# Patient Record
Sex: MALE | Race: WHITE | NOT HISPANIC OR LATINO | Employment: OTHER | URBAN - METROPOLITAN AREA
[De-identification: names, ages, dates, MRNs, and addresses within clinical notes are randomized per-mention and may not be internally consistent; named-entity substitution may affect disease eponyms.]

---

## 2022-10-07 ENCOUNTER — HOSPITAL ENCOUNTER (INPATIENT)
Facility: HOSPITAL | Age: 62
LOS: 1 days | DRG: 871 | End: 2022-10-07
Attending: EMERGENCY MEDICINE | Admitting: INTERNAL MEDICINE
Payer: COMMERCIAL

## 2022-10-07 ENCOUNTER — HOSPITAL ENCOUNTER (INPATIENT)
Facility: HOSPITAL | Age: 62
LOS: 4 days | Discharge: HOME HEALTH | DRG: 216 | End: 2022-10-11
Attending: INTERNAL MEDICINE | Admitting: THORACIC SURGERY (CARDIOTHORACIC VASCULAR SURGERY)
Payer: COMMERCIAL

## 2022-10-07 ENCOUNTER — ANESTHESIA EVENT (INPATIENT)
Dept: PERIOP | Facility: HOSPITAL | Age: 62
DRG: 871 | End: 2022-10-07
Payer: COMMERCIAL

## 2022-10-07 ENCOUNTER — ANESTHESIA (INPATIENT)
Dept: PERIOP | Facility: HOSPITAL | Age: 62
DRG: 871 | End: 2022-10-07
Payer: COMMERCIAL

## 2022-10-07 ENCOUNTER — APPOINTMENT (INPATIENT)
Dept: CT IMAGING | Facility: HOSPITAL | Age: 62
DRG: 871 | End: 2022-10-07
Payer: COMMERCIAL

## 2022-10-07 ENCOUNTER — APPOINTMENT (OUTPATIENT)
Dept: RADIOLOGY | Facility: HOSPITAL | Age: 62
DRG: 871 | End: 2022-10-07
Payer: COMMERCIAL

## 2022-10-07 ENCOUNTER — APPOINTMENT (INPATIENT)
Dept: NON INVASIVE DIAGNOSTICS | Facility: HOSPITAL | Age: 62
DRG: 871 | End: 2022-10-07
Payer: COMMERCIAL

## 2022-10-07 ENCOUNTER — APPOINTMENT (OUTPATIENT)
Dept: RADIOLOGY | Facility: HOSPITAL | Age: 62
DRG: 216 | End: 2022-10-07
Payer: COMMERCIAL

## 2022-10-07 VITALS
BODY MASS INDEX: 25.18 KG/M2 | SYSTOLIC BLOOD PRESSURE: 103 MMHG | OXYGEN SATURATION: 98 % | HEART RATE: 120 BPM | DIASTOLIC BLOOD PRESSURE: 72 MMHG | RESPIRATION RATE: 42 BRPM | TEMPERATURE: 101.48 F | WEIGHT: 190 LBS | HEIGHT: 73 IN

## 2022-10-07 DIAGNOSIS — A41.9 SEPSIS (HCC): ICD-10-CM

## 2022-10-07 DIAGNOSIS — R06.89 ACUTE RESPIRATORY INSUFFICIENCY: ICD-10-CM

## 2022-10-07 DIAGNOSIS — I51.1 ACUTE MITRAL REGURGITATION FROM CHORDAL RUPTURE (HCC): ICD-10-CM

## 2022-10-07 DIAGNOSIS — I34.0 ACUTE MITRAL REGURGITATION FROM CHORDAL RUPTURE (HCC): ICD-10-CM

## 2022-10-07 DIAGNOSIS — I34.0 SEVERE MITRAL REGURGITATION: ICD-10-CM

## 2022-10-07 DIAGNOSIS — Z87.74 HISTORY OF REPAIR OF CONGENITAL ATRIAL SEPTAL DEFECT (ASD): Primary | ICD-10-CM

## 2022-10-07 DIAGNOSIS — J96.01 ACUTE RESPIRATORY FAILURE WITH HYPOXIA (HCC): ICD-10-CM

## 2022-10-07 DIAGNOSIS — Z98.890 S/P MVR (MITRAL VALVE REPAIR): ICD-10-CM

## 2022-10-07 DIAGNOSIS — R77.8 ELEVATED TROPONIN: ICD-10-CM

## 2022-10-07 DIAGNOSIS — J18.9 MULTIFOCAL PNEUMONIA: Primary | ICD-10-CM

## 2022-10-07 PROBLEM — N17.9 AKI (ACUTE KIDNEY INJURY) (HCC): Status: ACTIVE | Noted: 2022-10-07

## 2022-10-07 PROBLEM — R57.0 CARDIOGENIC SHOCK (HCC): Status: ACTIVE | Noted: 2022-10-07

## 2022-10-07 PROBLEM — R79.89 ELEVATED D-DIMER: Status: ACTIVE | Noted: 2022-10-07

## 2022-10-07 PROBLEM — R65.20 SEVERE SEPSIS (HCC): Status: ACTIVE | Noted: 2022-10-07

## 2022-10-07 PROBLEM — E87.29 HIGH ANION GAP METABOLIC ACIDOSIS: Status: ACTIVE | Noted: 2022-10-07

## 2022-10-07 PROBLEM — R79.89 ELEVATED TROPONIN: Status: ACTIVE | Noted: 2022-10-07

## 2022-10-07 PROBLEM — R74.01 TRANSAMINITIS: Status: ACTIVE | Noted: 2022-10-07

## 2022-10-07 LAB
2HR DELTA HS TROPONIN: -13 NG/L
4HR DELTA HS TROPONIN: -23 NG/L
ABO GROUP BLD: NORMAL
ABO GROUP BLD: NORMAL
ALBUMIN SERPL BCP-MCNC: 3.7 G/DL (ref 3.5–5)
ALP SERPL-CCNC: 94 U/L (ref 46–116)
ALT SERPL W P-5'-P-CCNC: 21 U/L (ref 12–78)
ANION GAP SERPL CALCULATED.3IONS-SCNC: 12 MMOL/L (ref 4–13)
ANION GAP SERPL CALCULATED.3IONS-SCNC: 14 MMOL/L (ref 4–13)
AORTIC ROOT: 3.8 CM
APICAL FOUR CHAMBER EJECTION FRACTION: 59 %
APTT PPP: 26 SECONDS (ref 23–37)
APTT PPP: 48 SECONDS (ref 23–37)
AST SERPL W P-5'-P-CCNC: 20 U/L (ref 5–45)
ATRIAL RATE: 100 BPM
BACTERIA UR QL AUTO: ABNORMAL /HPF
BASE EX.OXY STD BLDV CALC-SCNC: 77.6 % (ref 60–80)
BASE EXCESS BLDA CALC-SCNC: -1 MMOL/L (ref -2–3)
BASE EXCESS BLDA CALC-SCNC: -2 MMOL/L (ref -2–3)
BASE EXCESS BLDA CALC-SCNC: -3 MMOL/L (ref -2–3)
BASE EXCESS BLDA CALC-SCNC: -5 MMOL/L (ref -2–3)
BASE EXCESS BLDA CALC-SCNC: -6 MMOL/L (ref -2–3)
BASE EXCESS BLDA CALC-SCNC: -7 MMOL/L (ref -2–3)
BASE EXCESS BLDA CALC-SCNC: -8 MMOL/L (ref -2–3)
BASE EXCESS BLDA CALC-SCNC: -8 MMOL/L (ref -2–3)
BASE EXCESS BLDA CALC-SCNC: -9 MMOL/L (ref -2–3)
BASE EXCESS BLDA CALC-SCNC: 1 MMOL/L (ref -2–3)
BASE EXCESS BLDV CALC-SCNC: -7 MMOL/L
BASOPHILS # BLD AUTO: 0.03 THOUSANDS/ΜL (ref 0–0.1)
BASOPHILS NFR BLD AUTO: 0 % (ref 0–1)
BILIRUB SERPL-MCNC: 1.8 MG/DL (ref 0.2–1)
BILIRUB UR QL STRIP: NEGATIVE
BLD GP AB SCN SERPL QL: NEGATIVE
BUN SERPL-MCNC: 25 MG/DL (ref 5–25)
BUN SERPL-MCNC: 26 MG/DL (ref 5–25)
CA-I BLD-SCNC: 1 MMOL/L (ref 1.12–1.32)
CA-I BLD-SCNC: 1 MMOL/L (ref 1.12–1.32)
CA-I BLD-SCNC: 1.03 MMOL/L (ref 1.12–1.32)
CA-I BLD-SCNC: 1.08 MMOL/L (ref 1.12–1.32)
CA-I BLD-SCNC: 1.12 MMOL/L (ref 1.12–1.32)
CA-I BLD-SCNC: 1.18 MMOL/L (ref 1.12–1.32)
CA-I BLD-SCNC: 1.2 MMOL/L (ref 1.12–1.32)
CA-I BLD-SCNC: 1.21 MMOL/L (ref 1.12–1.32)
CALCIUM SERPL-MCNC: 8.4 MG/DL (ref 8.3–10.1)
CALCIUM SERPL-MCNC: 9.4 MG/DL (ref 8.3–10.1)
CARDIAC TROPONIN I PNL SERPL HS: 114 NG/L
CARDIAC TROPONIN I PNL SERPL HS: 124 NG/L
CARDIAC TROPONIN I PNL SERPL HS: 137 NG/L
CHLORIDE SERPL-SCNC: 100 MMOL/L (ref 96–108)
CHLORIDE SERPL-SCNC: 111 MMOL/L (ref 96–108)
CHOLEST SERPL-MCNC: 245 MG/DL
CLARITY UR: CLEAR
CO2 SERPL-SCNC: 20 MMOL/L (ref 21–32)
CO2 SERPL-SCNC: 21 MMOL/L (ref 21–32)
COLOR UR: YELLOW
CREAT SERPL-MCNC: 1.34 MG/DL (ref 0.6–1.3)
CREAT SERPL-MCNC: 1.64 MG/DL (ref 0.6–1.3)
D DIMER PPP FEU-MCNC: 0.85 UG/ML FEU
E WAVE DECELERATION TIME: 123 MS
EOSINOPHIL # BLD AUTO: 0 THOUSAND/ΜL (ref 0–0.61)
EOSINOPHIL NFR BLD AUTO: 0 % (ref 0–6)
ERYTHROCYTE [DISTWIDTH] IN BLOOD BY AUTOMATED COUNT: 12.5 % (ref 11.6–15.1)
EST. AVERAGE GLUCOSE BLD GHB EST-MCNC: 103 MG/DL
FIBRINOGEN PPP-MCNC: 335 MG/DL (ref 227–495)
FIO2 GAS DIL.REBREATH: 100 L
FIO2 GAS DIL.REBREATH: 80 L
FLUAV RNA RESP QL NAA+PROBE: NEGATIVE
FLUAV RNA RESP QL NAA+PROBE: NEGATIVE
FLUBV RNA RESP QL NAA+PROBE: NEGATIVE
FLUBV RNA RESP QL NAA+PROBE: NEGATIVE
FRACTIONAL SHORTENING: 38 (ref 28–44)
GFR SERPL CREATININE-BSD FRML MDRD: 43 ML/MIN/1.73SQ M
GFR SERPL CREATININE-BSD FRML MDRD: 55 ML/MIN/1.73SQ M
GLUCOSE SERPL-MCNC: 155 MG/DL (ref 65–140)
GLUCOSE SERPL-MCNC: 162 MG/DL (ref 65–140)
GLUCOSE SERPL-MCNC: 163 MG/DL (ref 65–140)
GLUCOSE SERPL-MCNC: 164 MG/DL (ref 65–140)
GLUCOSE SERPL-MCNC: 164 MG/DL (ref 65–140)
GLUCOSE SERPL-MCNC: 166 MG/DL (ref 65–140)
GLUCOSE SERPL-MCNC: 168 MG/DL (ref 65–140)
GLUCOSE SERPL-MCNC: 174 MG/DL (ref 65–140)
GLUCOSE SERPL-MCNC: 180 MG/DL (ref 65–140)
GLUCOSE SERPL-MCNC: 183 MG/DL (ref 65–140)
GLUCOSE SERPL-MCNC: 185 MG/DL (ref 65–140)
GLUCOSE SERPL-MCNC: 203 MG/DL (ref 65–140)
GLUCOSE UR STRIP-MCNC: NEGATIVE MG/DL
HBA1C MFR BLD: 5.2 %
HCO3 BLDA-SCNC: 15.3 MMOL/L (ref 22–28)
HCO3 BLDA-SCNC: 20.4 MMOL/L (ref 22–28)
HCO3 BLDA-SCNC: 20.9 MMOL/L (ref 22–28)
HCO3 BLDA-SCNC: 21.9 MMOL/L (ref 22–28)
HCO3 BLDA-SCNC: 22.4 MMOL/L (ref 24–30)
HCO3 BLDA-SCNC: 22.8 MMOL/L (ref 22–28)
HCO3 BLDA-SCNC: 23 MMOL/L (ref 24–30)
HCO3 BLDA-SCNC: 24.3 MMOL/L (ref 22–28)
HCO3 BLDA-SCNC: 28.7 MMOL/L (ref 24–30)
HCO3 BLDA-SCNC: 28.8 MMOL/L (ref 24–30)
HCO3 BLDV-SCNC: 20.8 MMOL/L (ref 24–30)
HCT VFR BLD AUTO: 44.6 % (ref 36.5–49.3)
HCT VFR BLD AUTO: 49.7 % (ref 36.5–49.3)
HCT VFR BLD CALC: 31 % (ref 36.5–49.3)
HCT VFR BLD CALC: 33 % (ref 36.5–49.3)
HCT VFR BLD CALC: 33 % (ref 36.5–49.3)
HCT VFR BLD CALC: 35 % (ref 36.5–49.3)
HCT VFR BLD CALC: 35 % (ref 36.5–49.3)
HCT VFR BLD CALC: 41 % (ref 36.5–49.3)
HCT VFR BLD CALC: 42 % (ref 36.5–49.3)
HCT VFR BLD CALC: 44 % (ref 36.5–49.3)
HCT VFR BLD CALC: 45 % (ref 36.5–49.3)
HCT VFR BLD CALC: 49 % (ref 36.5–49.3)
HDLC SERPL-MCNC: 110 MG/DL
HGB BLD-MCNC: 15.2 G/DL (ref 12–17)
HGB BLD-MCNC: 17.5 G/DL (ref 12–17)
HGB BLDA-MCNC: 10.5 G/DL (ref 12–17)
HGB BLDA-MCNC: 11.2 G/DL (ref 12–17)
HGB BLDA-MCNC: 11.2 G/DL (ref 12–17)
HGB BLDA-MCNC: 11.9 G/DL (ref 12–17)
HGB BLDA-MCNC: 11.9 G/DL (ref 12–17)
HGB BLDA-MCNC: 13.9 G/DL (ref 12–17)
HGB BLDA-MCNC: 14.3 G/DL (ref 12–17)
HGB BLDA-MCNC: 15 G/DL (ref 12–17)
HGB BLDA-MCNC: 15.3 G/DL (ref 12–17)
HGB BLDA-MCNC: 16.7 G/DL (ref 12–17)
HGB UR QL STRIP.AUTO: NEGATIVE
IMM GRANULOCYTES # BLD AUTO: 0.13 THOUSAND/UL (ref 0–0.2)
IMM GRANULOCYTES NFR BLD AUTO: 1 % (ref 0–2)
INR PPP: 0.9 (ref 0.84–1.19)
INR PPP: 1.01 (ref 0.84–1.19)
INR PPP: 1.36 (ref 0.84–1.19)
INTERVENTRICULAR SEPTUM IN DIASTOLE (PARASTERNAL SHORT AXIS VIEW): 1.2 CM
INTERVENTRICULAR SEPTUM: 1.2 CM (ref 0.6–1.1)
KCT BLD-ACNC: 116 SEC (ref 89–137)
KCT BLD-ACNC: 128 SEC (ref 89–137)
KCT BLD-ACNC: 427 SEC (ref 89–137)
KCT BLD-ACNC: 453 SEC (ref 89–137)
KCT BLD-ACNC: 460 SEC (ref 89–137)
KCT BLD-ACNC: 480 SEC (ref 89–137)
KCT BLD-ACNC: 486 SEC (ref 89–137)
KCT BLD-ACNC: 537 SEC (ref 89–137)
KCT BLD-ACNC: 550 SEC (ref 89–137)
KETONES UR STRIP-MCNC: NEGATIVE MG/DL
LAAS-AP2: 26.9 CM2
LAAS-AP4: 26.6 CM2
LACTATE SERPL-SCNC: 3.7 MMOL/L (ref 0.5–2)
LACTATE SERPL-SCNC: 4.1 MMOL/L (ref 0.5–2)
LDLC SERPL CALC-MCNC: 120 MG/DL (ref 0–100)
LEFT ATRIUM AREA SYSTOLE SINGLE PLANE A4C: 24.8 CM2
LEFT ATRIUM SIZE: 5.1 CM
LEFT INTERNAL DIMENSION IN SYSTOLE: 3.1 CM (ref 2.1–4)
LEFT VENTRICULAR INTERNAL DIMENSION IN DIASTOLE: 5 CM (ref 3.5–6)
LEFT VENTRICULAR POSTERIOR WALL IN END DIASTOLE: 1 CM
LEFT VENTRICULAR STROKE VOLUME: 80 ML
LEUKOCYTE ESTERASE UR QL STRIP: NEGATIVE
LVSV (TEICH): 80 ML
LYMPHOCYTES # BLD AUTO: 1.61 THOUSANDS/ΜL (ref 0.6–4.47)
LYMPHOCYTES NFR BLD AUTO: 7 % (ref 14–44)
MAGNESIUM SERPL-MCNC: 1.8 MG/DL (ref 1.6–2.6)
MCH RBC QN AUTO: 29.9 PG (ref 26.8–34.3)
MCHC RBC AUTO-ENTMCNC: 35.2 G/DL (ref 31.4–37.4)
MCV RBC AUTO: 85 FL (ref 82–98)
MONOCYTES # BLD AUTO: 1.35 THOUSAND/ΜL (ref 0.17–1.22)
MONOCYTES NFR BLD AUTO: 6 % (ref 4–12)
MUCOUS THREADS UR QL AUTO: ABNORMAL
MV E'TISSUE VEL-SEP: 17 CM/S
MV PEAK A VEL: 0.01 M/S
MV PEAK E VEL: 126 CM/S
MV STENOSIS PRESSURE HALF TIME: 36 MS
MV VALVE AREA P 1/2 METHOD: 6.11
NEUTROPHILS # BLD AUTO: 19.56 THOUSANDS/ΜL (ref 1.85–7.62)
NEUTS SEG NFR BLD AUTO: 86 % (ref 43–75)
NITRITE UR QL STRIP: NEGATIVE
NON-SQ EPI CELLS URNS QL MICRO: ABNORMAL /HPF
NRBC BLD AUTO-RTO: 0 /100 WBCS
NT-PROBNP SERPL-MCNC: 2080 PG/ML
O2 CT BLDV-SCNC: 17.5 ML/DL
P AXIS: 75 DEGREES
PCO2 BLD: 16 MMOL/L (ref 21–32)
PCO2 BLD: 22 MMOL/L (ref 21–32)
PCO2 BLD: 23 MMOL/L (ref 21–32)
PCO2 BLD: 23 MMOL/L (ref 21–32)
PCO2 BLD: 24 MMOL/L (ref 21–32)
PCO2 BLD: 24 MMOL/L (ref 21–32)
PCO2 BLD: 25 MMOL/L (ref 21–32)
PCO2 BLD: 26 MMOL/L (ref 21–32)
PCO2 BLD: 27.4 MM HG (ref 36–44)
PCO2 BLD: 31 MMOL/L (ref 21–32)
PCO2 BLD: 31 MMOL/L (ref 21–32)
PCO2 BLD: 44.3 MM HG (ref 36–44)
PCO2 BLD: 44.8 MM HG (ref 36–44)
PCO2 BLD: 46.6 MM HG (ref 36–44)
PCO2 BLD: 53.8 MM HG (ref 42–50)
PCO2 BLD: 56.5 MM HG (ref 36–44)
PCO2 BLD: 56.6 MM HG (ref 36–44)
PCO2 BLD: 59.8 MM HG (ref 42–50)
PCO2 BLD: 65.3 MM HG (ref 42–50)
PCO2 BLD: 75.5 MM HG (ref 42–50)
PCO2 BLDV: 50.2 MM HG (ref 42–50)
PH BLD: 7.16 [PH] (ref 7.3–7.4)
PH BLD: 7.17 [PH] (ref 7.35–7.45)
PH BLD: 7.18 [PH] (ref 7.35–7.45)
PH BLD: 7.19 [PH] (ref 7.3–7.4)
PH BLD: 7.23 [PH] (ref 7.3–7.4)
PH BLD: 7.28 [PH] (ref 7.35–7.45)
PH BLD: 7.29 [PH] (ref 7.3–7.4)
PH BLD: 7.32 [PH] (ref 7.35–7.45)
PH BLD: 7.34 [PH] (ref 7.35–7.45)
PH BLD: 7.35 [PH] (ref 7.35–7.45)
PH BLDV: 7.24 [PH] (ref 7.3–7.4)
PH UR STRIP.AUTO: 5 [PH]
PHOSPHATE SERPL-MCNC: 4.1 MG/DL (ref 2.3–4.1)
PLATELET # BLD AUTO: 224 THOUSANDS/UL (ref 149–390)
PLATELET # BLD AUTO: 230 THOUSANDS/UL (ref 149–390)
PLATELET # BLD AUTO: 302 THOUSANDS/UL (ref 149–390)
PLATELET # BLD AUTO: 322 THOUSANDS/UL (ref 149–390)
PMV BLD AUTO: 11.2 FL (ref 8.9–12.7)
PMV BLD AUTO: 11.3 FL (ref 8.9–12.7)
PMV BLD AUTO: 11.6 FL (ref 8.9–12.7)
PMV BLD AUTO: 11.6 FL (ref 8.9–12.7)
PO2 BLD: 105 MM HG (ref 75–129)
PO2 BLD: 117 MM HG (ref 75–129)
PO2 BLD: 131 MM HG (ref 75–129)
PO2 BLD: 183 MM HG (ref 75–129)
PO2 BLD: 243 MM HG (ref 75–129)
PO2 BLD: 312 MM HG (ref 35–45)
PO2 BLD: 313 MM HG (ref 75–129)
PO2 BLD: 56 MM HG (ref 35–45)
PO2 BLD: 56 MM HG (ref 35–45)
PO2 BLD: 59 MM HG (ref 35–45)
PO2 BLDV: 47.5 MM HG (ref 35–45)
POTASSIUM BLD-SCNC: 3.8 MMOL/L (ref 3.5–5.3)
POTASSIUM BLD-SCNC: 4.1 MMOL/L (ref 3.5–5.3)
POTASSIUM BLD-SCNC: 4.2 MMOL/L (ref 3.5–5.3)
POTASSIUM BLD-SCNC: 4.2 MMOL/L (ref 3.5–5.3)
POTASSIUM BLD-SCNC: 4.3 MMOL/L (ref 3.5–5.3)
POTASSIUM BLD-SCNC: 4.4 MMOL/L (ref 3.5–5.3)
POTASSIUM BLD-SCNC: 4.5 MMOL/L (ref 3.5–5.3)
POTASSIUM BLD-SCNC: 4.9 MMOL/L (ref 3.5–5.3)
POTASSIUM SERPL-SCNC: 4.2 MMOL/L (ref 3.5–5.3)
POTASSIUM SERPL-SCNC: 4.8 MMOL/L (ref 3.5–5.3)
PR INTERVAL: 146 MS
PROCALCITONIN SERPL-MCNC: 0.28 NG/ML
PROCALCITONIN SERPL-MCNC: 0.62 NG/ML
PROT SERPL-MCNC: 7.2 G/DL (ref 6.4–8.4)
PROT UR STRIP-MCNC: NEGATIVE MG/DL
PROTHROMBIN TIME: 12.8 SECONDS (ref 11.6–14.5)
PROTHROMBIN TIME: 14 SECONDS (ref 11.6–14.5)
PROTHROMBIN TIME: 17 SECONDS (ref 11.6–14.5)
QRS AXIS: 75 DEGREES
QRSD INTERVAL: 74 MS
QT INTERVAL: 374 MS
QTC INTERVAL: 482 MS
RA PRESSURE ESTIMATED: 3 MMHG
RBC # BLD AUTO: 5.85 MILLION/UL (ref 3.88–5.62)
RBC #/AREA URNS AUTO: ABNORMAL /HPF
RH BLD: POSITIVE
RH BLD: POSITIVE
RIGHT ATRIUM AREA SYSTOLE A4C: 17.2 CM2
RIGHT VENTRICLE ID DIMENSION: 4.2 CM
RSV RNA RESP QL NAA+PROBE: NEGATIVE
RSV RNA RESP QL NAA+PROBE: NEGATIVE
SAO2 % BLD FROM PO2: 100 % (ref 60–85)
SAO2 % BLD FROM PO2: 79 % (ref 60–85)
SAO2 % BLD FROM PO2: 81 % (ref 60–85)
SAO2 % BLD FROM PO2: 82 % (ref 60–85)
SAO2 % BLD FROM PO2: 96 % (ref 60–85)
SAO2 % BLD FROM PO2: 98 % (ref 60–85)
SAO2 % BLD FROM PO2: 99 % (ref 60–85)
SARS-COV-2 RNA RESP QL NAA+PROBE: NEGATIVE
SARS-COV-2 RNA RESP QL NAA+PROBE: NEGATIVE
SL CV LEFT ATRIUM LENGTH A2C: 6.4 CM
SL CV LV EF: 70
SL CV PED ECHO LEFT VENTRICLE DIASTOLIC VOLUME (MOD BIPLANE) 2D: 118 ML
SL CV PED ECHO LEFT VENTRICLE SYSTOLIC VOLUME (MOD BIPLANE) 2D: 38 ML
SODIUM BLD-SCNC: 134 MMOL/L (ref 136–145)
SODIUM BLD-SCNC: 135 MMOL/L (ref 136–145)
SODIUM BLD-SCNC: 136 MMOL/L (ref 136–145)
SODIUM BLD-SCNC: 137 MMOL/L (ref 136–145)
SODIUM BLD-SCNC: 137 MMOL/L (ref 136–145)
SODIUM BLD-SCNC: 138 MMOL/L (ref 136–145)
SODIUM BLD-SCNC: 139 MMOL/L (ref 136–145)
SODIUM BLD-SCNC: 140 MMOL/L (ref 136–145)
SODIUM SERPL-SCNC: 135 MMOL/L (ref 135–147)
SODIUM SERPL-SCNC: 143 MMOL/L (ref 135–147)
SP GR UR STRIP.AUTO: 1.02 (ref 1–1.03)
SPECIMEN EXPIRATION DATE: NORMAL
SPECIMEN SOURCE: ABNORMAL
SPECIMEN SOURCE: NORMAL
SPECIMEN SOURCE: NORMAL
T WAVE AXIS: 77 DEGREES
TRICUSPID ANNULAR PLANE SYSTOLIC EXCURSION: 3.5 CM
TRIGL SERPL-MCNC: 77 MG/DL
UROBILINOGEN UR STRIP-ACNC: <2 MG/DL
VENTRICULAR RATE: 100 BPM
WBC # BLD AUTO: 22.68 THOUSAND/UL (ref 4.31–10.16)
WBC #/AREA URNS AUTO: ABNORMAL /HPF

## 2022-10-07 PROCEDURE — 99285 EMERGENCY DEPT VISIT HI MDM: CPT

## 2022-10-07 PROCEDURE — 84295 ASSAY OF SERUM SODIUM: CPT

## 2022-10-07 PROCEDURE — 5A1223Z PERFORMANCE OF CARDIAC PACING, CONTINUOUS: ICD-10-PCS | Performed by: THORACIC SURGERY (CARDIOTHORACIC VASCULAR SURGERY)

## 2022-10-07 PROCEDURE — 82947 ASSAY GLUCOSE BLOOD QUANT: CPT

## 2022-10-07 PROCEDURE — 84100 ASSAY OF PHOSPHORUS: CPT | Performed by: NURSE PRACTITIONER

## 2022-10-07 PROCEDURE — 85014 HEMATOCRIT: CPT | Performed by: PHYSICIAN ASSISTANT

## 2022-10-07 PROCEDURE — 82803 BLOOD GASES ANY COMBINATION: CPT

## 2022-10-07 PROCEDURE — 99285 EMERGENCY DEPT VISIT HI MDM: CPT | Performed by: EMERGENCY MEDICINE

## 2022-10-07 PROCEDURE — 94760 N-INVAS EAR/PLS OXIMETRY 1: CPT | Performed by: SOCIAL WORKER

## 2022-10-07 PROCEDURE — 84145 PROCALCITONIN (PCT): CPT | Performed by: NURSE PRACTITIONER

## 2022-10-07 PROCEDURE — 4A133B1 MONITORING OF ARTERIAL PRESSURE, PERIPHERAL, PERCUTANEOUS APPROACH: ICD-10-PCS | Performed by: INTERNAL MEDICINE

## 2022-10-07 PROCEDURE — 99221 1ST HOSP IP/OBS SF/LOW 40: CPT | Performed by: THORACIC SURGERY (CARDIOTHORACIC VASCULAR SURGERY)

## 2022-10-07 PROCEDURE — 84145 PROCALCITONIN (PCT): CPT | Performed by: EMERGENCY MEDICINE

## 2022-10-07 PROCEDURE — 93454 CORONARY ARTERY ANGIO S&I: CPT | Performed by: INTERNAL MEDICINE

## 2022-10-07 PROCEDURE — 93005 ELECTROCARDIOGRAM TRACING: CPT

## 2022-10-07 PROCEDURE — 94002 VENT MGMT INPAT INIT DAY: CPT

## 2022-10-07 PROCEDURE — 85610 PROTHROMBIN TIME: CPT | Performed by: PHYSICIAN ASSISTANT

## 2022-10-07 PROCEDURE — 82330 ASSAY OF CALCIUM: CPT

## 2022-10-07 PROCEDURE — 02BG0ZZ EXCISION OF MITRAL VALVE, OPEN APPROACH: ICD-10-PCS | Performed by: THORACIC SURGERY (CARDIOTHORACIC VASCULAR SURGERY)

## 2022-10-07 PROCEDURE — 93010 ELECTROCARDIOGRAM REPORT: CPT | Performed by: INTERNAL MEDICINE

## 2022-10-07 PROCEDURE — 83605 ASSAY OF LACTIC ACID: CPT | Performed by: EMERGENCY MEDICINE

## 2022-10-07 PROCEDURE — 0241U HB NFCT DS VIR RESP RNA 4 TRGT: CPT | Performed by: EMERGENCY MEDICINE

## 2022-10-07 PROCEDURE — 99223 1ST HOSP IP/OBS HIGH 75: CPT | Performed by: STUDENT IN AN ORGANIZED HEALTH CARE EDUCATION/TRAINING PROGRAM

## 2022-10-07 PROCEDURE — 94640 AIRWAY INHALATION TREATMENT: CPT

## 2022-10-07 PROCEDURE — 87449 NOS EACH ORGANISM AG IA: CPT | Performed by: NURSE PRACTITIONER

## 2022-10-07 PROCEDURE — 86900 BLOOD TYPING SEROLOGIC ABO: CPT | Performed by: PHYSICIAN ASSISTANT

## 2022-10-07 PROCEDURE — 88313 SPECIAL STAINS GROUP 2: CPT | Performed by: PATHOLOGY

## 2022-10-07 PROCEDURE — 85025 COMPLETE CBC W/AUTO DIFF WBC: CPT | Performed by: EMERGENCY MEDICINE

## 2022-10-07 PROCEDURE — 86920 COMPATIBILITY TEST SPIN: CPT

## 2022-10-07 PROCEDURE — 94760 N-INVAS EAR/PLS OXIMETRY 1: CPT

## 2022-10-07 PROCEDURE — 82805 BLOOD GASES W/O2 SATURATION: CPT | Performed by: PHYSICIAN ASSISTANT

## 2022-10-07 PROCEDURE — 85049 AUTOMATED PLATELET COUNT: CPT | Performed by: THORACIC SURGERY (CARDIOTHORACIC VASCULAR SURGERY)

## 2022-10-07 PROCEDURE — 5A1221Z PERFORMANCE OF CARDIAC OUTPUT, CONTINUOUS: ICD-10-PCS | Performed by: THORACIC SURGERY (CARDIOTHORACIC VASCULAR SURGERY)

## 2022-10-07 PROCEDURE — 96367 TX/PROPH/DG ADDL SEQ IV INF: CPT

## 2022-10-07 PROCEDURE — 85049 AUTOMATED PLATELET COUNT: CPT | Performed by: NURSE PRACTITIONER

## 2022-10-07 PROCEDURE — 84484 ASSAY OF TROPONIN QUANT: CPT | Performed by: EMERGENCY MEDICINE

## 2022-10-07 PROCEDURE — 33967 INSERT I-AORT PERCUT DEVICE: CPT | Performed by: INTERNAL MEDICINE

## 2022-10-07 PROCEDURE — 30233N0 TRANSFUSION OF AUTOLOGOUS RED BLOOD CELLS INTO PERIPHERAL VEIN, PERCUTANEOUS APPROACH: ICD-10-PCS | Performed by: ANESTHESIOLOGY

## 2022-10-07 PROCEDURE — 87040 BLOOD CULTURE FOR BACTERIA: CPT | Performed by: EMERGENCY MEDICINE

## 2022-10-07 PROCEDURE — 85730 THROMBOPLASTIN TIME PARTIAL: CPT | Performed by: EMERGENCY MEDICINE

## 2022-10-07 PROCEDURE — 80061 LIPID PANEL: CPT | Performed by: NURSE PRACTITIONER

## 2022-10-07 PROCEDURE — 83880 ASSAY OF NATRIURETIC PEPTIDE: CPT | Performed by: INTERNAL MEDICINE

## 2022-10-07 PROCEDURE — 36415 COLL VENOUS BLD VENIPUNCTURE: CPT | Performed by: EMERGENCY MEDICINE

## 2022-10-07 PROCEDURE — 86901 BLOOD TYPING SEROLOGIC RH(D): CPT | Performed by: PHYSICIAN ASSISTANT

## 2022-10-07 PROCEDURE — 33427 REPAIR OF MITRAL VALVE: CPT | Performed by: THORACIC SURGERY (CARDIOTHORACIC VASCULAR SURGERY)

## 2022-10-07 PROCEDURE — 36620 INSERTION CATHETER ARTERY: CPT | Performed by: NURSE PRACTITIONER

## 2022-10-07 PROCEDURE — 99152 MOD SED SAME PHYS/QHP 5/>YRS: CPT | Performed by: INTERNAL MEDICINE

## 2022-10-07 PROCEDURE — 81001 URINALYSIS AUTO W/SCOPE: CPT | Performed by: PHYSICIAN ASSISTANT

## 2022-10-07 PROCEDURE — 03HY32Z INSERTION OF MONITORING DEVICE INTO UPPER ARTERY, PERCUTANEOUS APPROACH: ICD-10-PCS | Performed by: INTERNAL MEDICINE

## 2022-10-07 PROCEDURE — 84132 ASSAY OF SERUM POTASSIUM: CPT

## 2022-10-07 PROCEDURE — 0241U HB NFCT DS VIR RESP RNA 4 TRGT: CPT | Performed by: STUDENT IN AN ORGANIZED HEALTH CARE EDUCATION/TRAINING PROGRAM

## 2022-10-07 PROCEDURE — 85018 HEMOGLOBIN: CPT | Performed by: PHYSICIAN ASSISTANT

## 2022-10-07 PROCEDURE — 02VG0ZZ RESTRICTION OF MITRAL VALVE, OPEN APPROACH: ICD-10-PCS | Performed by: THORACIC SURGERY (CARDIOTHORACIC VASCULAR SURGERY)

## 2022-10-07 PROCEDURE — NC001 PR NO CHARGE: Performed by: NURSE PRACTITIONER

## 2022-10-07 PROCEDURE — 80053 COMPREHEN METABOLIC PANEL: CPT | Performed by: EMERGENCY MEDICINE

## 2022-10-07 PROCEDURE — 85384 FIBRINOGEN ACTIVITY: CPT | Performed by: PHYSICIAN ASSISTANT

## 2022-10-07 PROCEDURE — 83036 HEMOGLOBIN GLYCOSYLATED A1C: CPT | Performed by: NURSE PRACTITIONER

## 2022-10-07 PROCEDURE — 02UG0JZ SUPPLEMENT MITRAL VALVE WITH SYNTHETIC SUBSTITUTE, OPEN APPROACH: ICD-10-PCS | Performed by: THORACIC SURGERY (CARDIOTHORACIC VASCULAR SURGERY)

## 2022-10-07 PROCEDURE — 02HV33Z INSERTION OF INFUSION DEVICE INTO SUPERIOR VENA CAVA, PERCUTANEOUS APPROACH: ICD-10-PCS | Performed by: ANESTHESIOLOGY

## 2022-10-07 PROCEDURE — G1004 CDSM NDSC: HCPCS

## 2022-10-07 PROCEDURE — 99153 MOD SED SAME PHYS/QHP EA: CPT | Performed by: INTERNAL MEDICINE

## 2022-10-07 PROCEDURE — 93306 TTE W/DOPPLER COMPLETE: CPT | Performed by: INTERNAL MEDICINE

## 2022-10-07 PROCEDURE — B2111ZZ FLUOROSCOPY OF MULTIPLE CORONARY ARTERIES USING LOW OSMOLAR CONTRAST: ICD-10-PCS | Performed by: INTERNAL MEDICINE

## 2022-10-07 PROCEDURE — 71250 CT THORAX DX C-: CPT

## 2022-10-07 PROCEDURE — 02Q50ZZ REPAIR ATRIAL SEPTUM, OPEN APPROACH: ICD-10-PCS | Performed by: THORACIC SURGERY (CARDIOTHORACIC VASCULAR SURGERY)

## 2022-10-07 PROCEDURE — 71045 X-RAY EXAM CHEST 1 VIEW: CPT

## 2022-10-07 PROCEDURE — 83735 ASSAY OF MAGNESIUM: CPT | Performed by: NURSE PRACTITIONER

## 2022-10-07 PROCEDURE — 87081 CULTURE SCREEN ONLY: CPT | Performed by: NURSE PRACTITIONER

## 2022-10-07 PROCEDURE — 85049 AUTOMATED PLATELET COUNT: CPT | Performed by: PHYSICIAN ASSISTANT

## 2022-10-07 PROCEDURE — 96361 HYDRATE IV INFUSION ADD-ON: CPT

## 2022-10-07 PROCEDURE — 33427 REPAIR OF MITRAL VALVE: CPT | Performed by: PHYSICIAN ASSISTANT

## 2022-10-07 PROCEDURE — 86850 RBC ANTIBODY SCREEN: CPT | Performed by: PHYSICIAN ASSISTANT

## 2022-10-07 PROCEDURE — 96365 THER/PROPH/DIAG IV INF INIT: CPT

## 2022-10-07 PROCEDURE — 85730 THROMBOPLASTIN TIME PARTIAL: CPT | Performed by: PHYSICIAN ASSISTANT

## 2022-10-07 PROCEDURE — 84132 ASSAY OF SERUM POTASSIUM: CPT | Performed by: PHYSICIAN ASSISTANT

## 2022-10-07 PROCEDURE — 4A133J1 MONITORING OF ARTERIAL PULSE, PERIPHERAL, PERCUTANEOUS APPROACH: ICD-10-PCS | Performed by: INTERNAL MEDICINE

## 2022-10-07 PROCEDURE — 88305 TISSUE EXAM BY PATHOLOGIST: CPT | Performed by: PATHOLOGY

## 2022-10-07 PROCEDURE — 99223 1ST HOSP IP/OBS HIGH 75: CPT | Performed by: INTERNAL MEDICINE

## 2022-10-07 PROCEDURE — 82948 REAGENT STRIP/BLOOD GLUCOSE: CPT

## 2022-10-07 PROCEDURE — 85610 PROTHROMBIN TIME: CPT | Performed by: EMERGENCY MEDICINE

## 2022-10-07 PROCEDURE — 85014 HEMATOCRIT: CPT

## 2022-10-07 PROCEDURE — 85610 PROTHROMBIN TIME: CPT | Performed by: NURSE PRACTITIONER

## 2022-10-07 PROCEDURE — 85379 FIBRIN DEGRADATION QUANT: CPT | Performed by: INTERNAL MEDICINE

## 2022-10-07 PROCEDURE — 99291 CRITICAL CARE FIRST HOUR: CPT | Performed by: INTERNAL MEDICINE

## 2022-10-07 PROCEDURE — 85347 COAGULATION TIME ACTIVATED: CPT

## 2022-10-07 PROCEDURE — C1894 INTRO/SHEATH, NON-LASER: HCPCS | Performed by: INTERNAL MEDICINE

## 2022-10-07 PROCEDURE — C1769 GUIDE WIRE: HCPCS | Performed by: INTERNAL MEDICINE

## 2022-10-07 PROCEDURE — 81001 URINALYSIS AUTO W/SCOPE: CPT | Performed by: NURSE PRACTITIONER

## 2022-10-07 PROCEDURE — 80048 BASIC METABOLIC PNL TOTAL CA: CPT | Performed by: PHYSICIAN ASSISTANT

## 2022-10-07 PROCEDURE — 5A02210 ASSISTANCE WITH CARDIAC OUTPUT USING BALLOON PUMP, CONTINUOUS: ICD-10-PCS | Performed by: INTERNAL MEDICINE

## 2022-10-07 PROCEDURE — C1725 CATH, TRANSLUMIN NON-LASER: HCPCS | Performed by: INTERNAL MEDICINE

## 2022-10-07 PROCEDURE — 93306 TTE W/DOPPLER COMPLETE: CPT

## 2022-10-07 DEVICE — CARPENTIER-EDWARDS PHYSIO II ANNULOPLASTY RING
Type: IMPLANTABLE DEVICE | Site: HEART | Status: FUNCTIONAL
Brand: CARPENTIER-EDWARDS PHYSIO II ANNULOPLASTY RING

## 2022-10-07 RX ORDER — ASPIRIN 325 MG
325 TABLET ORAL DAILY
Status: DISCONTINUED | OUTPATIENT
Start: 2022-10-08 | End: 2022-10-11 | Stop reason: HOSPADM

## 2022-10-07 RX ORDER — CEFTRIAXONE 1 G/50ML
1000 INJECTION, SOLUTION INTRAVENOUS EVERY 24 HOURS
Status: DISCONTINUED | OUTPATIENT
Start: 2022-10-08 | End: 2022-10-07 | Stop reason: HOSPADM

## 2022-10-07 RX ORDER — POTASSIUM CHLORIDE 14.9 MG/ML
20 INJECTION INTRAVENOUS
Status: DISCONTINUED | OUTPATIENT
Start: 2022-10-07 | End: 2022-10-09

## 2022-10-07 RX ORDER — HEPARIN SODIUM 1000 [USP'U]/ML
INJECTION, SOLUTION INTRAVENOUS; SUBCUTANEOUS AS NEEDED
Status: DISCONTINUED | OUTPATIENT
Start: 2022-10-07 | End: 2022-10-07

## 2022-10-07 RX ORDER — CEFEPIME HYDROCHLORIDE 2 G/50ML
2000 INJECTION, SOLUTION INTRAVENOUS EVERY 8 HOURS
Status: DISCONTINUED | OUTPATIENT
Start: 2022-10-07 | End: 2022-10-07

## 2022-10-07 RX ORDER — IPRATROPIUM BROMIDE AND ALBUTEROL SULFATE 2.5; .5 MG/3ML; MG/3ML
3 SOLUTION RESPIRATORY (INHALATION) ONCE
Status: COMPLETED | OUTPATIENT
Start: 2022-10-07 | End: 2022-10-07

## 2022-10-07 RX ORDER — VANCOMYCIN HYDROCHLORIDE 1 G/20ML
INJECTION, POWDER, LYOPHILIZED, FOR SOLUTION INTRAVENOUS AS NEEDED
Status: DISCONTINUED | OUTPATIENT
Start: 2022-10-07 | End: 2022-10-07 | Stop reason: HOSPADM

## 2022-10-07 RX ORDER — FENTANYL CITRATE 50 UG/ML
INJECTION, SOLUTION INTRAMUSCULAR; INTRAVENOUS AS NEEDED
Status: DISCONTINUED | OUTPATIENT
Start: 2022-10-07 | End: 2022-10-07

## 2022-10-07 RX ORDER — NEOSTIGMINE METHYLSULFATE 1 MG/ML
INJECTION INTRAVENOUS AS NEEDED
Status: DISCONTINUED | OUTPATIENT
Start: 2022-10-07 | End: 2022-10-07

## 2022-10-07 RX ORDER — CEFTRIAXONE 1 G/50ML
1000 INJECTION, SOLUTION INTRAVENOUS EVERY 24 HOURS
Status: CANCELLED | OUTPATIENT
Start: 2022-10-08

## 2022-10-07 RX ORDER — HEPARIN SODIUM 5000 [USP'U]/ML
5000 INJECTION, SOLUTION INTRAVENOUS; SUBCUTANEOUS EVERY 8 HOURS SCHEDULED
Status: DISCONTINUED | OUTPATIENT
Start: 2022-10-07 | End: 2022-10-07 | Stop reason: HOSPADM

## 2022-10-07 RX ORDER — AMINOCAPROIC ACID 250 MG/ML
INJECTION, SOLUTION INTRAVENOUS AS NEEDED
Status: DISCONTINUED | OUTPATIENT
Start: 2022-10-07 | End: 2022-10-07

## 2022-10-07 RX ORDER — CEFEPIME HYDROCHLORIDE 2 G/50ML
2000 INJECTION, SOLUTION INTRAVENOUS EVERY 12 HOURS
Status: DISCONTINUED | OUTPATIENT
Start: 2022-10-07 | End: 2022-10-07

## 2022-10-07 RX ORDER — CHLORHEXIDINE GLUCONATE 0.12 MG/ML
15 RINSE ORAL EVERY 12 HOURS SCHEDULED
Status: DISCONTINUED | OUTPATIENT
Start: 2022-10-07 | End: 2022-10-07 | Stop reason: HOSPADM

## 2022-10-07 RX ORDER — AMIODARONE HYDROCHLORIDE 200 MG/1
200 TABLET ORAL EVERY 8 HOURS SCHEDULED
Status: DISCONTINUED | OUTPATIENT
Start: 2022-10-07 | End: 2022-10-11 | Stop reason: HOSPADM

## 2022-10-07 RX ORDER — HEPARIN SODIUM 1000 [USP'U]/ML
10000 INJECTION, SOLUTION INTRAVENOUS; SUBCUTANEOUS ONCE
Status: DISCONTINUED | OUTPATIENT
Start: 2022-10-07 | End: 2022-10-07 | Stop reason: HOSPADM

## 2022-10-07 RX ORDER — PROPOFOL 10 MG/ML
INJECTION, EMULSION INTRAVENOUS AS NEEDED
Status: DISCONTINUED | OUTPATIENT
Start: 2022-10-07 | End: 2022-10-07

## 2022-10-07 RX ORDER — BISACODYL 10 MG
10 SUPPOSITORY, RECTAL RECTAL DAILY PRN
Status: DISCONTINUED | OUTPATIENT
Start: 2022-10-07 | End: 2022-10-11 | Stop reason: HOSPADM

## 2022-10-07 RX ORDER — CEFAZOLIN SODIUM 2 G/50ML
2000 SOLUTION INTRAVENOUS ONCE
Status: DISCONTINUED | OUTPATIENT
Start: 2022-10-07 | End: 2022-10-07

## 2022-10-07 RX ORDER — SODIUM CHLORIDE 450 MG/100ML
20 INJECTION, SOLUTION INTRAVENOUS CONTINUOUS
Status: DISCONTINUED | OUTPATIENT
Start: 2022-10-07 | End: 2022-10-09

## 2022-10-07 RX ORDER — OMEGA-3/DHA/EPA/FISH OIL 60 MG-90MG
CAPSULE ORAL EVERY 12 HOURS
COMMUNITY

## 2022-10-07 RX ORDER — MAGNESIUM HYDROXIDE 1200 MG/15ML
LIQUID ORAL AS NEEDED
Status: DISCONTINUED | OUTPATIENT
Start: 2022-10-07 | End: 2022-10-07 | Stop reason: HOSPADM

## 2022-10-07 RX ORDER — CHLORHEXIDINE GLUCONATE 0.12 MG/ML
15 RINSE ORAL 2 TIMES DAILY
Status: DISCONTINUED | OUTPATIENT
Start: 2022-10-07 | End: 2022-10-09

## 2022-10-07 RX ORDER — HEPARIN SODIUM 1000 [USP'U]/ML
400 INJECTION, SOLUTION INTRAVENOUS; SUBCUTANEOUS ONCE
Status: DISCONTINUED | OUTPATIENT
Start: 2022-10-07 | End: 2022-10-07 | Stop reason: HOSPADM

## 2022-10-07 RX ORDER — CEFTRIAXONE 1 G/50ML
1000 INJECTION, SOLUTION INTRAVENOUS EVERY 24 HOURS
Status: DISCONTINUED | OUTPATIENT
Start: 2022-10-07 | End: 2022-10-07

## 2022-10-07 RX ORDER — PROTAMINE SULFATE 10 MG/ML
INJECTION, SOLUTION INTRAVENOUS AS NEEDED
Status: DISCONTINUED | OUTPATIENT
Start: 2022-10-07 | End: 2022-10-07

## 2022-10-07 RX ORDER — HEPARIN SODIUM 5000 [USP'U]/ML
5000 INJECTION, SOLUTION INTRAVENOUS; SUBCUTANEOUS EVERY 8 HOURS SCHEDULED
Status: DISCONTINUED | OUTPATIENT
Start: 2022-10-08 | End: 2022-10-10

## 2022-10-07 RX ORDER — MIDAZOLAM HYDROCHLORIDE 2 MG/2ML
INJECTION, SOLUTION INTRAMUSCULAR; INTRAVENOUS AS NEEDED
Status: DISCONTINUED | OUTPATIENT
Start: 2022-10-07 | End: 2022-10-07

## 2022-10-07 RX ORDER — LIDOCAINE HYDROCHLORIDE 10 MG/ML
INJECTION, SOLUTION EPIDURAL; INFILTRATION; INTRACAUDAL; PERINEURAL AS NEEDED
Status: DISCONTINUED | OUTPATIENT
Start: 2022-10-07 | End: 2022-10-07 | Stop reason: HOSPADM

## 2022-10-07 RX ORDER — ROCURONIUM BROMIDE 10 MG/ML
INJECTION, SOLUTION INTRAVENOUS AS NEEDED
Status: DISCONTINUED | OUTPATIENT
Start: 2022-10-07 | End: 2022-10-07

## 2022-10-07 RX ORDER — SODIUM CHLORIDE 9 MG/ML
INJECTION, SOLUTION INTRAVENOUS CONTINUOUS PRN
Status: DISCONTINUED | OUTPATIENT
Start: 2022-10-07 | End: 2022-10-07

## 2022-10-07 RX ORDER — ALBUTEROL SULFATE 2.5 MG/3ML
SOLUTION RESPIRATORY (INHALATION)
Status: DISCONTINUED
Start: 2022-10-07 | End: 2022-10-07 | Stop reason: HOSPADM

## 2022-10-07 RX ORDER — CEFAZOLIN SODIUM 1 G/3ML
INJECTION, POWDER, FOR SOLUTION INTRAMUSCULAR; INTRAVENOUS AS NEEDED
Status: DISCONTINUED | OUTPATIENT
Start: 2022-10-07 | End: 2022-10-07

## 2022-10-07 RX ORDER — OXYCODONE HYDROCHLORIDE 5 MG/1
5 TABLET ORAL EVERY 4 HOURS PRN
Status: DISCONTINUED | OUTPATIENT
Start: 2022-10-07 | End: 2022-10-11 | Stop reason: HOSPADM

## 2022-10-07 RX ORDER — HYDROMORPHONE HCL/PF 1 MG/ML
0.5 SYRINGE (ML) INJECTION EVERY 2 HOUR PRN
Status: DISCONTINUED | OUTPATIENT
Start: 2022-10-07 | End: 2022-10-10

## 2022-10-07 RX ORDER — POLYETHYLENE GLYCOL 3350 17 G/17G
17 POWDER, FOR SOLUTION ORAL DAILY
Status: DISCONTINUED | OUTPATIENT
Start: 2022-10-08 | End: 2022-10-11 | Stop reason: HOSPADM

## 2022-10-07 RX ORDER — POTASSIUM CHLORIDE 14.9 MG/ML
20 INJECTION INTRAVENOUS ONCE AS NEEDED
Status: DISCONTINUED | OUTPATIENT
Start: 2022-10-07 | End: 2022-10-09

## 2022-10-07 RX ORDER — HEPARIN SODIUM 5000 [USP'U]/ML
5000 INJECTION, SOLUTION INTRAVENOUS; SUBCUTANEOUS EVERY 8 HOURS SCHEDULED
Status: CANCELLED | OUTPATIENT
Start: 2022-10-07

## 2022-10-07 RX ORDER — NOREPINEPHRINE BITARTRATE 1 MG/ML
INJECTION, SOLUTION INTRAVENOUS
Status: COMPLETED
Start: 2022-10-07 | End: 2022-10-07

## 2022-10-07 RX ORDER — MULTIVIT WITH MINERALS/LUTEIN
TABLET ORAL EVERY 24 HOURS
COMMUNITY

## 2022-10-07 RX ORDER — CEFAZOLIN SODIUM 2 G/50ML
2000 SOLUTION INTRAVENOUS ONCE
Status: DISCONTINUED | OUTPATIENT
Start: 2022-10-07 | End: 2022-10-07 | Stop reason: HOSPADM

## 2022-10-07 RX ORDER — FUROSEMIDE 10 MG/ML
40 INJECTION INTRAMUSCULAR; INTRAVENOUS ONCE
Status: COMPLETED | OUTPATIENT
Start: 2022-10-07 | End: 2022-10-07

## 2022-10-07 RX ORDER — DEXMEDETOMIDINE HYDROCHLORIDE 4 UG/ML
.1-1.2 INJECTION, SOLUTION INTRAVENOUS
Status: DISCONTINUED | OUTPATIENT
Start: 2022-10-07 | End: 2022-10-09

## 2022-10-07 RX ORDER — LEVALBUTEROL 1.25 MG/.5ML
1.25 SOLUTION, CONCENTRATE RESPIRATORY (INHALATION)
Status: CANCELLED | OUTPATIENT
Start: 2022-10-07

## 2022-10-07 RX ORDER — ALBUMIN, HUMAN INJ 5% 5 %
12.5 SOLUTION INTRAVENOUS ONCE
Status: COMPLETED | OUTPATIENT
Start: 2022-10-07 | End: 2022-10-07

## 2022-10-07 RX ORDER — ACETAMINOPHEN 650 MG/1
650 SUPPOSITORY RECTAL EVERY 4 HOURS PRN
Status: DISCONTINUED | OUTPATIENT
Start: 2022-10-07 | End: 2022-10-07 | Stop reason: HOSPADM

## 2022-10-07 RX ORDER — ACETAMINOPHEN 325 MG/1
975 TABLET ORAL EVERY 8 HOURS
Status: DISCONTINUED | OUTPATIENT
Start: 2022-10-07 | End: 2022-10-11 | Stop reason: HOSPADM

## 2022-10-07 RX ORDER — ONDANSETRON 2 MG/ML
4 INJECTION INTRAMUSCULAR; INTRAVENOUS EVERY 6 HOURS PRN
Status: DISCONTINUED | OUTPATIENT
Start: 2022-10-07 | End: 2022-10-11 | Stop reason: HOSPADM

## 2022-10-07 RX ORDER — CALCIUM CHLORIDE 100 MG/ML
1 INJECTION INTRAVENOUS; INTRAVENTRICULAR ONCE
Status: DISCONTINUED | OUTPATIENT
Start: 2022-10-07 | End: 2022-10-08

## 2022-10-07 RX ORDER — IPRATROPIUM BROMIDE AND ALBUTEROL SULFATE 2.5; .5 MG/3ML; MG/3ML
SOLUTION RESPIRATORY (INHALATION)
Status: DISCONTINUED
Start: 2022-10-07 | End: 2022-10-07 | Stop reason: HOSPADM

## 2022-10-07 RX ORDER — HEPARIN SODIUM 5000 [USP'U]/ML
5000 INJECTION, SOLUTION INTRAVENOUS; SUBCUTANEOUS EVERY 8 HOURS SCHEDULED
Status: DISCONTINUED | OUTPATIENT
Start: 2022-10-07 | End: 2022-10-07

## 2022-10-07 RX ORDER — FUROSEMIDE 10 MG/ML
60 INJECTION INTRAMUSCULAR; INTRAVENOUS ONCE
Status: COMPLETED | OUTPATIENT
Start: 2022-10-07 | End: 2022-10-07

## 2022-10-07 RX ORDER — PANTOPRAZOLE SODIUM 40 MG/1
40 TABLET, DELAYED RELEASE ORAL DAILY
Status: DISCONTINUED | OUTPATIENT
Start: 2022-10-08 | End: 2022-10-11 | Stop reason: HOSPADM

## 2022-10-07 RX ORDER — MAGNESIUM SULFATE HEPTAHYDRATE 40 MG/ML
2 INJECTION, SOLUTION INTRAVENOUS ONCE
Status: COMPLETED | OUTPATIENT
Start: 2022-10-07 | End: 2022-10-07

## 2022-10-07 RX ORDER — DOCUSATE SODIUM 100 MG/1
100 CAPSULE, LIQUID FILLED ORAL 2 TIMES DAILY
Status: DISCONTINUED | OUTPATIENT
Start: 2022-10-07 | End: 2022-10-11 | Stop reason: HOSPADM

## 2022-10-07 RX ORDER — GLYCOPYRROLATE 0.2 MG/ML
INJECTION INTRAMUSCULAR; INTRAVENOUS AS NEEDED
Status: DISCONTINUED | OUTPATIENT
Start: 2022-10-07 | End: 2022-10-07

## 2022-10-07 RX ORDER — ACETAMINOPHEN 650 MG/1
SUPPOSITORY RECTAL
Status: DISCONTINUED
Start: 2022-10-07 | End: 2022-10-07 | Stop reason: HOSPADM

## 2022-10-07 RX ORDER — OXYCODONE HYDROCHLORIDE 5 MG/1
2.5 TABLET ORAL EVERY 4 HOURS PRN
Status: DISCONTINUED | OUTPATIENT
Start: 2022-10-07 | End: 2022-10-11 | Stop reason: HOSPADM

## 2022-10-07 RX ORDER — EPHEDRINE SULFATE 50 MG/ML
INJECTION INTRAVENOUS AS NEEDED
Status: DISCONTINUED | OUTPATIENT
Start: 2022-10-07 | End: 2022-10-07

## 2022-10-07 RX ORDER — LEVALBUTEROL 1.25 MG/.5ML
1.25 SOLUTION, CONCENTRATE RESPIRATORY (INHALATION)
Status: DISCONTINUED | OUTPATIENT
Start: 2022-10-07 | End: 2022-10-07 | Stop reason: HOSPADM

## 2022-10-07 RX ORDER — ATORVASTATIN CALCIUM 20 MG/1
20 TABLET, FILM COATED ORAL
Status: DISCONTINUED | OUTPATIENT
Start: 2022-10-08 | End: 2022-10-07

## 2022-10-07 RX ORDER — FENTANYL CITRATE 50 UG/ML
50 INJECTION, SOLUTION INTRAMUSCULAR; INTRAVENOUS
Status: DISCONTINUED | OUTPATIENT
Start: 2022-10-07 | End: 2022-10-09

## 2022-10-07 RX ORDER — CEFTRIAXONE 2 G/50ML
2000 INJECTION, SOLUTION INTRAVENOUS ONCE
Status: COMPLETED | OUTPATIENT
Start: 2022-10-07 | End: 2022-10-07

## 2022-10-07 RX ORDER — CEFAZOLIN SODIUM 2 G/50ML
2000 SOLUTION INTRAVENOUS EVERY 8 HOURS
Status: DISCONTINUED | OUTPATIENT
Start: 2022-10-07 | End: 2022-10-08

## 2022-10-07 RX ORDER — FENTANYL CITRATE 50 UG/ML
INJECTION, SOLUTION INTRAMUSCULAR; INTRAVENOUS AS NEEDED
Status: DISCONTINUED | OUTPATIENT
Start: 2022-10-07 | End: 2022-10-07 | Stop reason: HOSPADM

## 2022-10-07 RX ORDER — ALBUTEROL SULFATE 2.5 MG/3ML
7.5 SOLUTION RESPIRATORY (INHALATION) ONCE
Status: COMPLETED | OUTPATIENT
Start: 2022-10-07 | End: 2022-10-07

## 2022-10-07 RX ORDER — MIDAZOLAM HYDROCHLORIDE 2 MG/2ML
4 INJECTION, SOLUTION INTRAMUSCULAR; INTRAVENOUS ONCE
Status: COMPLETED | OUTPATIENT
Start: 2022-10-07 | End: 2022-10-07

## 2022-10-07 RX ORDER — FENTANYL CITRATE 50 UG/ML
50 INJECTION, SOLUTION INTRAMUSCULAR; INTRAVENOUS ONCE
Status: COMPLETED | OUTPATIENT
Start: 2022-10-07 | End: 2022-10-07

## 2022-10-07 RX ORDER — ATORVASTATIN CALCIUM 40 MG/1
40 TABLET, FILM COATED ORAL
Status: DISCONTINUED | OUTPATIENT
Start: 2022-10-08 | End: 2022-10-11 | Stop reason: HOSPADM

## 2022-10-07 RX ADMIN — AZITHROMYCIN MONOHYDRATE 500 MG: 500 INJECTION, POWDER, LYOPHILIZED, FOR SOLUTION INTRAVENOUS at 09:47

## 2022-10-07 RX ADMIN — OXYCODONE HYDROCHLORIDE 5 MG: 5 TABLET ORAL at 23:00

## 2022-10-07 RX ADMIN — FENTANYL CITRATE 250 MCG: 0.05 INJECTION, SOLUTION INTRAMUSCULAR; INTRAVENOUS at 18:39

## 2022-10-07 RX ADMIN — ACETAMINOPHEN 975 MG: 325 TABLET, FILM COATED ORAL at 23:01

## 2022-10-07 RX ADMIN — NOREPINEPHRINE BITARTRATE 4 MG: 1 SOLUTION INTRAVENOUS at 23:41

## 2022-10-07 RX ADMIN — SODIUM CHLORIDE 1 UNITS/HR: 9 INJECTION, SOLUTION INTRAVENOUS at 21:52

## 2022-10-07 RX ADMIN — DOCUSATE SODIUM 100 MG: 100 CAPSULE, LIQUID FILLED ORAL at 23:00

## 2022-10-07 RX ADMIN — IPRATROPIUM BROMIDE AND ALBUTEROL SULFATE 3 ML: 2.5; .5 SOLUTION RESPIRATORY (INHALATION) at 11:27

## 2022-10-07 RX ADMIN — EPINEPHRINE 3 MCG/MIN: 1 INJECTION, SOLUTION, CONCENTRATE INTRAVENOUS at 17:08

## 2022-10-07 RX ADMIN — PROTAMINE SULFATE 190 MG: 10 INJECTION, SOLUTION INTRAVENOUS at 19:53

## 2022-10-07 RX ADMIN — FUROSEMIDE 40 MG: 10 INJECTION, SOLUTION INTRAMUSCULAR; INTRAVENOUS at 13:05

## 2022-10-07 RX ADMIN — PROTAMINE SULFATE 10 MG: 10 INJECTION, SOLUTION INTRAVENOUS at 19:49

## 2022-10-07 RX ADMIN — SODIUM CHLORIDE 20 ML/HR: 0.45 INJECTION, SOLUTION INTRAVENOUS at 21:30

## 2022-10-07 RX ADMIN — ALBUMIN (HUMAN) 12.5 G: 12.5 SOLUTION INTRAVENOUS at 23:23

## 2022-10-07 RX ADMIN — AMIODARONE HYDROCHLORIDE 200 MG: 200 TABLET ORAL at 23:00

## 2022-10-07 RX ADMIN — SODIUM CHLORIDE 1000 ML: 0.9 INJECTION, SOLUTION INTRAVENOUS at 09:46

## 2022-10-07 RX ADMIN — SODIUM CHLORIDE 1000 ML: 0.9 INJECTION, SOLUTION INTRAVENOUS at 08:09

## 2022-10-07 RX ADMIN — AMINOCAPROIC ACID 5 G: 250 INJECTION, SOLUTION INTRAVENOUS at 17:23

## 2022-10-07 RX ADMIN — MAGNESIUM SULFATE HEPTAHYDRATE 2 G: 40 INJECTION, SOLUTION INTRAVENOUS at 21:34

## 2022-10-07 RX ADMIN — GLYCOPYRROLATE 0.6 MG: 0.2 INJECTION, SOLUTION INTRAMUSCULAR; INTRAVENOUS at 20:54

## 2022-10-07 RX ADMIN — EPINEPHRINE 3 MCG/MIN: 1 INJECTION INTRAMUSCULAR; INTRAVENOUS; SUBCUTANEOUS at 21:00

## 2022-10-07 RX ADMIN — NOREPINEPHRINE BITARTRATE 10.8 MCG/MIN: 1 INJECTION, SOLUTION, CONCENTRATE INTRAVENOUS at 21:00

## 2022-10-07 RX ADMIN — FENTANYL CITRATE 250 MCG: 0.05 INJECTION, SOLUTION INTRAMUSCULAR; INTRAVENOUS at 17:57

## 2022-10-07 RX ADMIN — FENTANYL CITRATE 250 MCG: 0.05 INJECTION, SOLUTION INTRAMUSCULAR; INTRAVENOUS at 20:23

## 2022-10-07 RX ADMIN — CEFTRIAXONE 2000 MG: 2 INJECTION, SOLUTION INTRAVENOUS at 08:09

## 2022-10-07 RX ADMIN — EPHEDRINE SULFATE 10 MG: 50 INJECTION INTRAVENOUS at 17:08

## 2022-10-07 RX ADMIN — SODIUM CHLORIDE: 0.9 INJECTION, SOLUTION INTRAVENOUS at 17:03

## 2022-10-07 RX ADMIN — NOREPINEPHRINE BITARTRATE 15 MCG/MIN: 1 INJECTION, SOLUTION, CONCENTRATE INTRAVENOUS at 23:41

## 2022-10-07 RX ADMIN — NEOSTIGMINE METHYLSULFATE 5 MG: 1 INJECTION INTRAVENOUS at 20:54

## 2022-10-07 RX ADMIN — PROPOFOL 80 MG: 10 INJECTION, EMULSION INTRAVENOUS at 17:04

## 2022-10-07 RX ADMIN — SODIUM NITROPRUSSIDE 0.1 MCG/KG/MIN: 50 INJECTION, SOLUTION INTRAVENOUS at 13:49

## 2022-10-07 RX ADMIN — FENTANYL CITRATE 50 MCG: 50 INJECTION INTRAMUSCULAR; INTRAVENOUS at 21:15

## 2022-10-07 RX ADMIN — HYDROMORPHONE HYDROCHLORIDE 0.5 MG: 1 INJECTION, SOLUTION INTRAMUSCULAR; INTRAVENOUS; SUBCUTANEOUS at 21:25

## 2022-10-07 RX ADMIN — MUPIROCIN 1 APPLICATION: 20 OINTMENT TOPICAL at 21:55

## 2022-10-07 RX ADMIN — ROCURONIUM BROMIDE 100 MG: 50 INJECTION INTRAVENOUS at 17:04

## 2022-10-07 RX ADMIN — CEFAZOLIN 2000 MG: 1 INJECTION, POWDER, FOR SOLUTION INTRAMUSCULAR; INTRAVENOUS at 20:30

## 2022-10-07 RX ADMIN — MIDAZOLAM 4 MG: 1 INJECTION INTRAMUSCULAR; INTRAVENOUS at 21:44

## 2022-10-07 RX ADMIN — CHLORHEXIDINE GLUCONATE 15 ML: 1.2 SOLUTION ORAL at 23:02

## 2022-10-07 RX ADMIN — FUROSEMIDE 60 MG: 10 INJECTION, SOLUTION INTRAMUSCULAR; INTRAVENOUS at 13:44

## 2022-10-07 RX ADMIN — INSULIN HUMAN 8 UNITS: 100 INJECTION, SOLUTION PARENTERAL at 18:25

## 2022-10-07 RX ADMIN — IPRATROPIUM BROMIDE 0.5 MG: 0.5 SOLUTION RESPIRATORY (INHALATION) at 13:33

## 2022-10-07 RX ADMIN — DEXMEDETOMIDINE HYDROCHLORIDE 0.7 MCG/KG/HR: 400 INJECTION INTRAVENOUS at 21:20

## 2022-10-07 RX ADMIN — ALBUTEROL SULFATE 7.5 MG: 2.5 SOLUTION RESPIRATORY (INHALATION) at 11:27

## 2022-10-07 RX ADMIN — NOREPINEPHRINE BITARTRATE 6 MCG/MIN: 1 INJECTION, SOLUTION, CONCENTRATE INTRAVENOUS at 17:08

## 2022-10-07 RX ADMIN — LEVALBUTEROL HYDROCHLORIDE 1.25 MG: 1.25 SOLUTION, CONCENTRATE RESPIRATORY (INHALATION) at 13:33

## 2022-10-07 RX ADMIN — FENTANYL CITRATE 50 MCG: 50 INJECTION INTRAMUSCULAR; INTRAVENOUS at 21:17

## 2022-10-07 RX ADMIN — MIDAZOLAM 3 MG: 1 INJECTION INTRAMUSCULAR; INTRAVENOUS at 17:04

## 2022-10-07 RX ADMIN — MORPHINE SULFATE 2 MG: 2 INJECTION, SOLUTION INTRAMUSCULAR; INTRAVENOUS at 12:52

## 2022-10-07 RX ADMIN — HEPARIN SODIUM 35000 UNITS: 1000 INJECTION INTRAVENOUS; SUBCUTANEOUS at 18:12

## 2022-10-07 RX ADMIN — MIDAZOLAM 3 MG: 1 INJECTION INTRAMUSCULAR; INTRAVENOUS at 18:39

## 2022-10-07 RX ADMIN — AMINOCAPROIC ACID 1 G/HR: 250 INJECTION, SOLUTION INTRAVENOUS at 17:23

## 2022-10-07 RX ADMIN — CEFAZOLIN 2000 MG: 1 INJECTION, POWDER, FOR SOLUTION INTRAMUSCULAR; INTRAVENOUS at 17:37

## 2022-10-07 RX ADMIN — FENTANYL CITRATE 250 MCG: 0.05 INJECTION, SOLUTION INTRAMUSCULAR; INTRAVENOUS at 17:04

## 2022-10-07 RX ADMIN — ACETAMINOPHEN 650 MG: 650 SUPPOSITORY RECTAL at 14:44

## 2022-10-07 NOTE — ASSESSMENT & PLAN NOTE
· POA with , WBC 22, LA 3 7, procal 0 28, RR 20-30  · Subjective fevers with sob, cough, decreased appetite at home   · Given symptoms and above imaging, concern for MF PNA as source   · Check UA   · BC x2, sputum, MRSA, strep, legionella neg   ·

## 2022-10-07 NOTE — UTILIZATION REVIEW
Initial Clinical Review    Admission: Date/Time/Statement:   Admission Orders (From admission, onward)     Ordered        10/07/22 1131  INPATIENT ADMISSION  Once                      Orders Placed This Encounter   Procedures    INPATIENT ADMISSION     Standing Status:   Standing     Number of Occurrences:   1     Order Specific Question:   Level of Care     Answer:   Level 1 Stepdown [13]     Order Specific Question:   Estimated length of stay     Answer:   More than 2 Midnights     Order Specific Question:   Certification     Answer:   I certify that inpatient services are medically necessary for this patient for a duration of greater than two midnights  See H&P and MD Progress Notes for additional information about the patient's course of treatment  ED Arrival Information     Expected   -    Arrival   10/7/2022 07:29    Acuity   Urgent            Means of arrival   Walk-In    Escorted by   Spouse    Service   Critical Care/ICU    Admission type   Emergency            Arrival complaint   SOB           Chief Complaint   Patient presents with    Shortness of Breath     Patient presents to the ED with c/o cough and SOB, states seen at urgent and dx with pneumonia and given ABX        Initial Presentation: 60 y o  male from home to ED admitted inpatient due to acute hypoxic respiratory failure due to pulmonary edema/acute mitral regurgitation due to ruptured leaflet/CON  Presented due to worsening shortness of breath with cough, chills and soreness of chest and abdomen starting day prior to arrival   Seen at Urgent Care and told had pneumonia and prescribed inhaler and antibiotics and compliant but no better  On exam: tachycardic  Tachypnea  Lungs rales  Lactic acid 3 7  Procalcitonin 0 28  Bun 25, creatinine 1 34 with unknown baseline  Hs tnl 137  Wbc 22 68  Ct chest showed groundglass opacities  In the ED given 1 liter IVF bolus x 2, ceftriaxone, azithromycin, Duoneb and albuterol neb    Placed on oxygen as sat to 89% and in ED escalated to mid flow oxygen  Plan is stat echo for murmur and showed normal EF but a blown posterior leaflet of his mitral valve with severe mitral regurgitation  IV lasix and start nitroprusside drip  Placed on Bipap  NPO  Continue antibiotics  Consult cardiology    Per Cardiology 10/7/22- patient with acute hypoxic respiratory failure with acute heart failure/severe MR with posterior leaflet flail/Multilobar pneumonia and CON  Plan is urgent transfer to [facility] for cardiac catheterization with IABP placement followed by CT surgery for MVR  Give IV lasix and dc IVF  Continue Bipap  Start Nipride gtt  10/7/22 1505  transferred to [facility] as higher level of care due to need for cardiac cath and mitral valve replacement         ED Triage Vitals   Temperature Pulse Respirations Blood Pressure SpO2   10/07/22 0736 10/07/22 0736 10/07/22 0736 10/07/22 0736 10/07/22 0736   (!) 97 3 °F (36 3 °C) (!) 120 22 121/77 92 %      Temp Source Heart Rate Source Patient Position - Orthostatic VS BP Location FiO2 (%)   10/07/22 0736 10/07/22 0736 10/07/22 0736 10/07/22 0736 10/07/22 1128   Temporal Monitor Sitting Right arm 96      Pain Score       10/07/22 0736       9          Wt Readings from Last 1 Encounters:   10/07/22 86 2 kg (190 lb)     Additional Vital Signs:   10/07/22 1435 101 48 °F (38 6 °C) Abnormal  120 Abnormal  42 Abnormal  103/72 83 114/55 74 mmHg 98 % -- -- -- -- -- -- --   10/07/22 1400 101 12 °F (38 4 °C) Abnormal  130 Abnormal  28 Abnormal  127/82 98 -- -- 97 % -- -- -- -- -- -- --   10/07/22 1345 100 94 °F (38 3 °C) Abnormal  131 Abnormal  38 Abnormal  142/82 103 -- -- 98 % -- -- -- -- -- -- --   10/07/22 1330 99 14 °F (37 3 °C) 134 Abnormal  33 Abnormal  156/84 111 -- -- 96 % -- -- -- -- -- -- --   10/07/22 1300 -- 144 Abnormal  -- 125/93 -- -- -- -- -- -- -- -- -- -- --   10/07/22 1215 98 9 °F (37 2 °C) 141 Abnormal  46 Abnormal  125/93 105 -- -- 92 % -- -- -- -- High flow nasal cannula Full face mask Lying   10/07/22 1128 -- -- -- -- -- -- -- 93 % 96 -- 50 L/min -- High flow nasal cannula -- --   10/07/22 1100 -- 116 Abnormal  32 Abnormal  118/78 92 -- -- 87 % Abnormal  -- -- -- -- Mid flow nasal cannula -- Sitting   10/07/22 1045 -- 119 Abnormal  34 Abnormal  132/87 103 -- -- 85 % Abnormal  -- -- -- -- Mid flow nasal cannula -- --   10/07/22 0945 -- 115 Abnormal  25 Abnormal  -- -- -- -- 90 % -- 40 -- 5 L/min Nasal cannula         Pertinent Labs/Diagnostic Test Results:   CT chest wo contrast   Final Result by Corrine Crigler, MD (10/07 0515)      Extensive right greater than left groundglass and alveolar opacities are identified  Findings again may reflect multi lobar pneumonia or pulmonary edema or combination of both  Small right greater than left pleural effusions  Workstation performed: [id number]         XR chest portable   ED Interpretation by Charlie Kelly MD (90/14 1838)   Abnormal   Multifocal pneumonia      Final Result by Rafael Burgos MD (10/07 7689)      Multifocal bilateral lung opacity which could be due to edema and/or pneumonia  Workstation performed: [id number]           10/7/22 ecg Interpretation: non-specific     Rate:     ECG rate:  115     ECG rate assessment: tachycardic     Rhythm:     Rhythm: sinus tachycardia     Ectopy:     Ectopy: none     QRS:     QRS axis:  Normal     QRS intervals:  Normal   Conduction:     Conduction: normal     ST segments:     ST segments:  Non-specific     Depression:  V4, aVF and V5    10/7/22 echo Left Ventricle: Left ventricular cavity size is normal  Wall thickness is normal  There is mild asymmetric hypertrophy of the basal septal wall  The left ventricular ejection fraction is 70% by visual estimation  Systolic function is hyperdynamic  Wall motion is normal     Mitral Valve:  The posterior leaflet is flail with about 11 mm gap to anterior leaflet  A small segment of a torn chorda tendinea is visualized  The valve does not appear significantly thickened or myxomatous  There is severe regurgitation    Acute severe MR due to flail posterior leaflet  There is no torn papillary seen  A small segment of chorda tendinea is appreciated suggesting ruptured chorda as etiology  The valve is not significant myxomatous   No LV wall motion abnormalities are seen    Results from last 7 days   Lab Units 10/07/22  1144 10/07/22  0742   SARS-COV-2  Negative Negative     Results from last 7 days   Lab Units 10/07/22  1424 10/07/22  1423 10/07/22  0742   WBC Thousand/uL  --   --  22 68*   HEMOGLOBIN g/dL  --   --  17 5*   I STAT HEMOGLOBIN g/dl  --  16 7  --    HEMATOCRIT %  --   --  49 7*   HEMATOCRIT, ISTAT %  --  49  --    PLATELETS Thousands/uL 302  --  322   NEUTROS ABS Thousands/µL  --   --  19 56*     Results from last 7 days   Lab Units 10/07/22  1423 10/07/22  0742   SODIUM mmol/L  --  135   POTASSIUM mmol/L  --  4 8   CHLORIDE mmol/L  --  100   CO2 mmol/L  --  21   CO2, I-STAT mmol/L 16*  --    ANION GAP mmol/L  --  14*   BUN mg/dL  --  25   CREATININE mg/dL  --  1 34*   EGFR ml/min/1 73sq m  --  55   CALCIUM mg/dL  --  9 4   CALCIUM, IONIZED, ISTAT mmol/L 1 21  --    MAGNESIUM mg/dL  --  1 8   PHOSPHORUS mg/dL  --  4 1     Results from last 7 days   Lab Units 10/07/22  0742   AST U/L 20   ALT U/L 21   ALK PHOS U/L 94   TOTAL PROTEIN g/dL 7 2   ALBUMIN g/dL 3 7   TOTAL BILIRUBIN mg/dL 1 80*     Results from last 7 days   Lab Units 10/07/22  0742   GLUCOSE RANDOM mg/dL 155*     Results from last 7 days   Lab Units 10/07/22  1423   I STAT BASE EXC mmol/L -8*   I STAT O2 SAT % 100*   ISTAT PH ART  7 353   I STAT ART PCO2 mm HG 27 4*   I STAT ART PO2 mm  0*   I STAT ART HCO3 mmol/L 15 3*         Results from last 7 days   Lab Units 10/07/22  1144 10/07/22  0957 10/07/22  0742   HS TNI 0HR ng/L  --   --  137*   HS TNI 2HR ng/L  --  124*  --    HSTNI D2 ng/L --  -13  --    HS TNI 4HR ng/L 114*  --   --    HSTNI D4 ng/L -23  --   --      Results from last 7 days   Lab Units 10/07/22  0742   D-DIMER QUANTITATIVE ug/ml FEU 0 85*     Results from last 7 days   Lab Units 10/07/22  1424 10/07/22  0742   PROTIME seconds 14 0 12 8   INR  1 01 0 90   PTT seconds  --  26     Results from last 7 days   Lab Units 10/07/22  1424 10/07/22  0742   PROCALCITONIN ng/ml 0 62* 0 28*     Results from last 7 days   Lab Units 10/07/22  1045 10/07/22  0742   LACTIC ACID mmol/L 4 1* 3 7*     Results from last 7 days   Lab Units 10/07/22  0742   NT-PRO BNP pg/mL 2,080*     Results from last 7 days   Lab Units 10/07/22  1430   CLARITY UA  Clear   COLOR UA  Yellow   SPEC GRAV UA  1 025   PH UA  5 0   GLUCOSE UA mg/dl Negative   KETONES UA mg/dl Negative   BLOOD UA  Negative   PROTEIN UA mg/dl Negative   NITRITE UA  Negative   BILIRUBIN UA  Negative   UROBILINOGEN UA (BE) mg/dl <2 0   LEUKOCYTES UA  Negative   WBC UA /hpf None Seen   RBC UA /hpf 0-1*   BACTERIA UA /hpf None Seen   EPITHELIAL CELLS WET PREP /hpf None Seen   MUCUS THREADS  Occasional*     Results from last 7 days   Lab Units 10/07/22  1144 10/07/22  0742   INFLUENZA A PCR  Negative Negative   INFLUENZA B PCR  Negative Negative   RSV PCR  Negative Negative     Results from last 7 days   Lab Units 10/07/22  0745 10/07/22  0742   BLOOD CULTURE  Received in Microbiology Lab  Culture in Progress  Received in Microbiology Lab  Culture in Progress         ED Treatment:   Medication Administration from 10/07/2022 0729 to 10/07/2022 1207       Date/Time Order Dose Route Action Comments     10/07/2022 0809 sodium chloride 0 9 % bolus 1,000 mL 1,000 mL Intravenous New Bag      10/07/2022 0809 cefTRIAXone (ROCEPHIN) IVPB (premix in dextrose) 2,000 mg 50 mL 2,000 mg Intravenous New Bag      10/07/2022 0946 sodium chloride 0 9 % bolus 1,000 mL 1,000 mL Intravenous New Bag      10/07/2022 0947 azithromycin (ZITHROMAX) 500 mg in sodium chloride 0 9% 250mL IVPB 500 mg 500 mg Intravenous New Bag      10/07/2022 1127 albuterol inhalation solution 7 5 mg 7 5 mg Nebulization Given      10/07/2022 1127 ipratropium-albuterol (DUO-NEB) 0 5-2 5 mg/3 mL inhalation solution 3 mL 3 mL Nebulization Given         Past Medical History:   Diagnosis Date    Severe mitral regurgitation      Present on Admission:   Severe sepsis (HCC)   Multifocal pneumonia   Acute respiratory failure with hypoxia (HCC)   High anion gap metabolic acidosis   Elevated troponin   Elevated d-dimer   CON (acute kidney injury) ([facility] [address] )      Admitting Diagnosis: Acute respiratory insufficiency [R06 89]  SOB (shortness of breath) [R06 02]  Elevated troponin [R77 8]  Sepsis ([facility] [address] ) [A41 9]  Multifocal pneumonia [J18 9]  Age/Sex: 60 y o  male  Admission Orders: 10/7/22 1131 inpatient   Scheduled Medications  That were administered in ICU:    furosemide (LASIX) injection 40 mg  Dose: 40 mg  Freq: Once Route: IV  Start: 10/07/22 1315 End: 10/07/22 1305    furosemide (LASIX) injection 60 mg  Dose: 60 mg  Freq: Once Route: IV  Start: 10/07/22 1345 End: 10/07/22 1344    ipratropium (ATROVENT) 0 02 % inhalation solution 0 5 mg  Dose: 0 5 mg  Freq: Every 6 hours (RESP) Route: NEBULIZATION  Start: 10/07/22 1400 End: 10/07/22 1548    levalbuterol (XOPENEX) inhalation solution 1 25 mg  Dose: 1 25 mg  Freq: Every 6 hours (RESP) Route: NEBULIZATION  Start: 10/07/22 1400 End: 10/07/22 1548    morphine injection 2 mg  Dose: 2 mg  Freq:  Once Route: IV  Start: 10/07/22 1300 End: 10/07/22 1252    nitroPRUSside (NIPRIDE) 50,000 mcg in dextrose 5 % 250 mL infusion  Rate: 2 6 mL/hr Dose: 0 1 mcg/kg/min  Weight Dosing Info: 86 2 kg  Freq: Titrated Route: IV  Last Dose: 0 2 mcg/kg/min (10/07/22 1435)  Start: 10/07/22 1330 End: 10/07/22 1548    IP CONSULT TO CARDIOLOGY    Network Utilization Review Department  ATTENTION: Please call with any questions or concerns to [phone] and carefully listen to the prompts so that you are directed to the right person  All voicemails are confidential   Carolina Donald all requests for admission clinical reviews, approved or denied determinations and any other requests to dedicated fax number below belonging to the campus where the patient is receiving treatment   List of dedicated fax numbers for the Facilities:  [address] DENIALS (Administrative/Medical Necessity) [phone]   [address] (Maternity/NICU/Pediatrics) [phone]   [address]  [address] [address] [address] [facility] [place] [address] [phone]

## 2022-10-07 NOTE — ASSESSMENT & PLAN NOTE
· No previous creat on file, however denies medical prob   · Creat on admission 1 34  · Presumed 2/2 sepsis, poss HF   · Received 2L IVF, hold on further volume admin 2/2 cardiac workup  · Trend UO, creat   · Avoid hypotension, nephrotoxic agents

## 2022-10-07 NOTE — ASSESSMENT & PLAN NOTE
· Ddimer 0 85   · In the setting of hypoxia, tachycardia   · Holding on CTA 2/2 respiratory failure as above   · No previous risk factors -- nonsmoker with no smoker hx, runner for exercise, no pulm or cardiac hx  · Check BL LE venous duplex to r/o dvt

## 2022-10-07 NOTE — ASSESSMENT & PLAN NOTE
· AG 14, co2 21   · Likely 2/2 lactic acidosis in the setting of MF pna and sepsis +/- HF   · Received 2L IVF in the ER, holding further 2/2 emergent cardiac w/u

## 2022-10-07 NOTE — ANESTHESIA PROCEDURE NOTES
Procedure Performed: FLIP Anesthesia  Start Time:         Preanesthesia Checklist    Patient identified, IV assessed, risks and benefits discussed, monitors and equipment assessed, procedure being performed at surgeon's request and anesthesia consent obtained  Procedure     Type of FLIP: complete FLIP with interpretation  Images Saved: ultrasound permanent image saved  Location performed: OR  Intubated  Heart visualized  Insertion of FLIP Probe:  Easy  Probe Type:  Epiaortic and multiplane  Modalities:  Color flow mapping, 3D, pulse wave Doppler and continuous wave Doppler  Echocardiographic and Doppler Measurements    PREPROCEDURE    LEFT VENTRICLE:  Systolic Function: normal   Cavity size: dilated  Cavity Dimension: 6 cm  RIGHT VENTRICLE:  Systolic Function: normal   Cavity size mildly dilated  AORTIC VALVE:  Leaflets: normal and trileaflet  Regurgitation: trace  MITRAL VALVE:  Leaflets: myxomatous  Leaflet Motions: flail  Regurgitation: severe  Specific leaflet segments with abnormal motions are described in the following comments: p2 shaded to p1 flail  TRICUSPID VALVE:  Leaflets: normal  Leaflet Motions: normal   Regurgitation: mild  OTHER VALVE FINDINGS:  Myxomatous MV with flail P2 segment with possible contribution of P1; abhilash-medially directed torrential MR     ASCENDING AORTA:  Size:  normal   Dissection not present  AORTIC ARCH:  Size:  normal   dissection not present  DESCENDING AORTA:  Size: normal   Dissection not present  OTHER AORTIC FINDINGS:   IABP approx 8 cm distal to arch/desc junction  RIGHT ATRIUM:   No spontaneous echo contrast     LEFT ATRIUM:  Size: dilated  LEFT ATRIAL APPENDAGE:   No spontaneous echo contrast     OTHER ATRIAL FINDINGS:  Atrial septal bowing towards RA  EPIAORTIC:  Plaque Thickness: 0-5 mm      OTHER FINDINGS:  Pericardium:  normal  Pleural Effusion:  left and small/moderate  POSTPROCEDURE    LEFT VENTRICLE: Unchanged   Ejection Fraction: 60 %  Cavity Size: normal      RIGHT VENTRICLE:   Systolic Function: normal  Cavity Size: mildly dilated  AORTIC VALVE: Unchanged   MITRAL VALVE:   Leaflets: ring  Regurgitation: trace  Mean Gradient: 2     TRICUSPID VALVE: Unchanged   Regurgitation: mild  OTHER VALVE FINDINGS: S/p ring mitral annuloplasty; well seated; trace early systolic MR; no BILLY     ATRIA: Unchanged   OTHER ATRIAL FINDINGS: PFO noted during surgical procedure and closed; small residual PFO with R-L flow noted, reviewed with JRF  AORTA: Unchanged   Dissection: Dissection not present  OTHER AORTA FINDINGS: Balloon well seated  REMOVAL:  Probe Removal: atraumatic

## 2022-10-07 NOTE — CONSULTS
Consultation - Cardiothoracic Surgery   Pamela Livingston 60 y o  male MRN: [id number]  Unit/Bed#: -01 Encounter: [id number]      History of Present Illness   Physician Requesting Consult: Sudeep Nunn DO  Reason for Consult / Principal Problem: acute severe MR  HPI: Pamela Livingston is a 60y o  year old male who presents with presents for evaluation of acute severe MR  Patient went to urgent care w/i past few days for sough/SOB & was dx w/ PNA & given abx (Amoxicillin) w/o improvement therefore presented to [facility] 10/7  CXR w/ PNA vs edema, CT chest w/ same  Started on abx (Rocephin/Azithromycin), COVID (-), placed on midflow in ED & transferred to ICU for management  Upon examination today, *** reports she feeling ***  Admits to ***  Denies CP, palpitations, SOB, BURROWS, LE edema b/l, orthopnea, PND, numbness/tinlging/paresthesias in UE or LE bilaterally, HA, lightheadeness, dizziness, presyncopal symptoms, hx syncopal events, N/V/D, hemoptysis, hematemesis, hematochezia, melena  Denies hx stroke, hx cancer w/ chest wall radiation, hx blood loss anemia, hx varicose veins or vein stripping  PMH includes ***  PSH includes ***  FH significant for paternal *** & maternal ***  Denies FH of CAD/MI, HTN, HL, valvular disease, DM, aortic aneurysms, or SCD  Patient is *** & worked as ***  Lives in a *** home ***  Does not use an assist device for ambulation  Drives  Free time activities include ***  Denies current or prior use of tobacco, ETOH, or drug use  NKDA  Cardiac pertinent medications include: ***  Other medications can be reviewed in the patient chart  Patient had no providers PTA  Consults    Review of Systems ***    Historical Information   History reviewed  No pertinent past medical history  History reviewed  No pertinent surgical history    Social History     Substance and Sexual Activity   Alcohol Use Not Currently     Social History     Substance and Sexual Activity   Drug Use Not Currently Social History     Tobacco Use   Smoking Status Never Smoker   Smokeless Tobacco Never Used     Family History: as above    Meds/Allergies   all current active meds have been reviewed, current meds:   Current Facility-Administered Medications   Medication Dose Route Frequency    albuterol (2 5 mg/3 mL) 0 083 % inhalation solution **ADS Override Pull**        [START ON 10/8/2022] cefTRIAXone (ROCEPHIN) IVPB (premix in dextrose) 1,000 mg 50 mL  1,000 mg Intravenous Q24H    And    [START ON 10/8/2022] azithromycin (ZITHROMAX) 500 mg in sodium chloride 0 9% 250mL IVPB 500 mg  500 mg Intravenous Q24H    heparin (porcine) subcutaneous injection 5,000 Units  5,000 Units Subcutaneous Q8H [address]    ipratropium (ATROVENT) 0 02 % inhalation solution 0 5 mg  0 5 mg Nebulization Q6H    ipratropium-albuterol (DUO-NEB) 0 5-2 5 mg/3 mL inhalation solution **ADS Override Pull**        levalbuterol (XOPENEX) inhalation solution 1 25 mg  1 25 mg Nebulization Q6H    nitroPRUSside (NIPRIDE) 50,000 mcg in dextrose 5 % 250 mL infusion  0 1 mcg/kg/min Intravenous Titrated    and PTA meds:   Prior to Admission Medications   Prescriptions Last Dose Informant Patient Reported? Taking? Ascorbic Acid (vitamin C) 1000 MG tablet   Yes No   Sig: every 24 hours   Cyanocobalamin (Vitamin B-12) 500 MCG LOZG   Yes No   Sig: every 24 hours   Lysine 1000 MG TABS   Yes No   Multiple Vitamin (MULTI-VITAMIN DAILY PO)   Yes No   Omega-3 Fatty Acids (Fish Oil) 500 MG CAPS   Yes No   Sig: Every 12 hours      Facility-Administered Medications: None     No Known Allergies    Objective   Vitals: Blood pressure 125/93, pulse (!) 144, temperature 98 9 °F (37 2 °C), temperature source Axillary, resp  rate (!) 31, height 6' 1" (1 854 m), weight 86 2 kg (190 lb), SpO2 98 %      Invasive Devices  Report    Peripheral Intravenous Line  Duration           Peripheral IV 10/07/22 Left Antecubital <1 day    Peripheral IV 10/07/22 Right Forearm <1 day Physical Exam    Lab Results:   Hb 17 5, plt 322, Cr 1 34, LFTs 10/7 t bili 1 8, PTT/Pt/INR WNL, procal 10/7 0 28, LA 10/7 (+) (max 4 1), troponin (+) (max 137), COVID 10/7 (-), BNP 2080  A1C, lipid panel needed  BC x2 10/7: pending  Sputum cx 10/7: pending  MRS 10/7: pending  Results from last 7 days   Lab Units 10/07/22  0742   WBC Thousand/uL 22 68*   HEMOGLOBIN g/dL 17 5*   HEMATOCRIT % 49 7*   PLATELETS Thousands/uL 322     Results from last 7 days   Lab Units 10/07/22  0742   POTASSIUM mmol/L 4 8   CHLORIDE mmol/L 100   CO2 mmol/L 21   BUN mg/dL 25   CREATININE mg/dL 1 34*   CALCIUM mg/dL 9 4     Results from last 7 days   Lab Units 10/07/22  0742   INR  0 90   PTT seconds 26     No results found for: HGBA1C  No results found for: CKTOTAL, CKMB, CKMBINDEX, TROPONINI    Imaging Studies: I have personally reviewed pertinent reports  and I have personally reviewed pertinent films in PACS  EKG, Pathology, and Other Studies: I have personally reviewed pertinent reports  and I have personally reviewed pertinent films in PACS    TTE 10/7: complete, report pending    CXR 10/7: multifocal b/l lung opacities (edema vs PNA)    EKG 10/7: done    CT chest w/o 10/7: extensive R>L ground glass & alveolar opacities (PNA vs pulm edema), small R>L pleural effusions    Assessment:  Severe mitral regurgitation  Lactic acidosis  Acute respiratory failure w/ hypoxia  Multifocal PNA  Metabolic Acidosis  Cardiogenic shock    Plan:    Risks and benefits of {ctssurgerytype:23219} were discussed in detail today with the patient  They understand and wish to proceed with further workup and ultimately surgical intervention  We have ordered routine preoperative laboratory and vascular studies  Pending the results of these tests, they will be scheduled for surgery *** with JOVANNA Evans       The patient was comfortable with our recommendations, and their questions were answered to their satisfaction    We will continue to evaluate the patient daily with further recommendations as work up is completed  Thank you for allowing us to participate in the care of this patient         Letitia Cheek  1:40 PM  10/07/22

## 2022-10-07 NOTE — ASSESSMENT & PLAN NOTE
· On RA at baseline, no known underlying pulm disease   · SOB and cough starting 10/6 at approx 1030 and rapidly progressing   · On arrival to ER, rapidly progressed from RA to 5L, MF and ultimately HFNC 50L 100%   · On arrival to the ICU, he has worsening tachypnea, worsening hypoxia leading to bipap   · Concern for MF PNA and cardiogenic etiologies   · CT chest 10/7 -- Extensive right greater than left groundglass and alveolar opacities are identified  Findings again may reflect multi lobar pneumonia or pulmonary edema or combination of both   Small right greater than left pleural effusions  · BNP elevated at 2080, elevated trops as below   · Cont bipap for increased WOB, profound hypoxia  · Goal spo2 > 92%

## 2022-10-07 NOTE — ED PROVIDER NOTES
History  Chief Complaint   Patient presents with    Shortness of Breath     Patient presents to the ED with c/o cough and SOB, states seen at urgent and dx with pneumonia and given ABX      60year old male presents for evaluation of worsening shortness of breath associated with cough, chills and soreness of the chest and abdomen worsening since yesterday morning  Patient had gone to urgent care yesterday where he states he had an EKG and CXR  He states he was diagnosed with pneumonia and started on amoxicillin and provided with an inhaler  He has taken a total of 3 doses of amoxicillin with no improvement in symptoms  Patient states he was not tested for COVID or influenza during that visit  He reports 2 episodes of hemoptysis with trace amounts of blood in sputum yesterday  Sputum has been otherwise clear  History provided by:  Patient  Shortness of Breath  Severity:  Moderate  Onset quality:  Gradual  Duration:  1 day  Timing:  Constant  Progression:  Worsening  Chronicity:  New  Relieved by:  Nothing  Worsened by:  Nothing  Ineffective treatments: inhaler, 3 doses of amoxicillin  Associated symptoms: abdominal pain (soreness abdominal wall), chest pain (soreness anterior chest) and cough    Associated symptoms: no fever, no headaches, no rash, no sore throat and no vomiting        Prior to Admission Medications   Prescriptions Last Dose Informant Patient Reported? Taking? Ascorbic Acid (vitamin C) 1000 MG tablet   Yes No   Sig: every 24 hours   Cyanocobalamin (Vitamin B-12) 500 MCG LOZG   Yes No   Sig: every 24 hours   Lysine 1000 MG TABS   Yes No   Multiple Vitamin (MULTI-VITAMIN DAILY PO)   Yes No   Omega-3 Fatty Acids (Fish Oil) 500 MG CAPS   Yes No   Sig: Every 12 hours      Facility-Administered Medications: None       History reviewed  No pertinent past medical history  History reviewed  No pertinent surgical history  History reviewed  No pertinent family history    I have reviewed and agree with the history as documented  E-Cigarette/Vaping     E-Cigarette/Vaping Substances     Social History     Tobacco Use    Smoking status: Never Smoker    Smokeless tobacco: Never Used   Substance Use Topics    Alcohol use: Not Currently    Drug use: Not Currently       Review of Systems   Constitutional: Positive for chills  Negative for fever  HENT: Negative for congestion and sore throat  Respiratory: Positive for cough and shortness of breath  Cardiovascular: Positive for chest pain (soreness anterior chest)  Negative for leg swelling  Gastrointestinal: Positive for abdominal pain (soreness abdominal wall)  Negative for constipation, diarrhea, nausea and vomiting  Musculoskeletal: Positive for myalgias  Skin: Negative for rash and wound  Neurological: Negative for dizziness, syncope and headaches  All other systems reviewed and are negative  Physical Exam  Physical Exam  Vitals and nursing note reviewed  Constitutional:       General: He is not in acute distress  Appearance: He is well-developed  He is not toxic-appearing or diaphoretic  HENT:      Head: Normocephalic and atraumatic  Right Ear: External ear normal       Left Ear: External ear normal       Nose: Nose normal    Eyes:      General: No scleral icterus  Cardiovascular:      Rate and Rhythm: Regular rhythm  Tachycardia present  Heart sounds: Normal heart sounds  Pulmonary:      Effort: Pulmonary effort is normal  Tachypnea present  No accessory muscle usage  Breath sounds: Examination of the left-upper field reveals rales  Examination of the left-middle field reveals rales  Rales present  Abdominal:      General: There is no distension  Musculoskeletal:         General: No deformity  Normal range of motion  Skin:     Findings: No rash  Neurological:      General: No focal deficit present  Mental Status: He is alert        Gait: Gait normal    Psychiatric:         Mood and Affect: Mood normal          Vital Signs  ED Triage Vitals [10/07/22 0736]   Temperature Pulse Respirations Blood Pressure SpO2   (!) 97 3 °F (36 3 °C) (!) 120 22 121/77 92 %      Temp Source Heart Rate Source Patient Position - Orthostatic VS BP Location FiO2 (%)   Temporal Monitor Sitting Right arm --      Pain Score       9           Vitals:    10/07/22 0736 10/07/22 0945   BP: 121/77    Pulse: (!) 120 (!) 115   Patient Position - Orthostatic VS: Sitting          Visual Acuity      ED Medications  Medications   sodium chloride 0 9 % bolus 1,000 mL (1,000 mL Intravenous New Bag 10/7/22 0946)   azithromycin (ZITHROMAX) 500 mg in sodium chloride 0 9% 250mL IVPB 500 mg (500 mg Intravenous New Bag 10/7/22 0947)   sodium chloride 0 9 % bolus 1,000 mL (0 mL Intravenous Stopped 10/7/22 0946)   cefTRIAXone (ROCEPHIN) IVPB (premix in dextrose) 2,000 mg 50 mL (0 mg Intravenous Stopped 10/7/22 0839)       Diagnostic Studies  Results Reviewed     Procedure Component Value Units Date/Time    D-dimer, quantitative [[id number]]     Lab Status: No result Specimen: Blood     NT-BNP PRO [[id number]]     Lab Status: No result Specimen: Blood     HS Troponin I 2hr [[id number]] Collected: 10/07/22 0957    Lab Status: In process Specimen: Blood from Arm, Right Updated: 10/07/22 1000    FLU/RSV/COVID - if FLU/RSV clinically relevant [[id number]]  (Normal) Collected: 10/07/22 0742    Lab Status: Final result Specimen: Nares from Nose Updated: 10/07/22 0855     SARS-CoV-2 Negative     INFLUENZA A PCR Negative     INFLUENZA B PCR Negative     RSV PCR Negative    Narrative:      FOR PEDIATRIC PATIENTS - copy/paste COVID Guidelines URL to browser: [email or web address]    SARS-CoV-2 assay is a Nucleic Acid Amplification assay intended for the  qualitative detection of nucleic acid from SARS-CoV-2 in nasopharyngeal  swabs  Results are for the presumptive identification of SARS-CoV-2 RNA      Positive results are indicative of infection with SARS-CoV-2, the virus  causing COVID-19, but do not rule out bacterial infection or co-infection  with other viruses  Laboratories within the [place] and its  territories are required to report all positive results to the appropriate  public health authorities  Negative results do not preclude SARS-CoV-2  infection and should not be used as the sole basis for treatment or other  patient management decisions  Negative results must be combined with  clinical observations, patient history, and epidemiological information  This test has not been FDA cleared or approved  This test has been authorized by FDA under an Emergency Use Authorization  (EUA)  This test is only authorized for the duration of time the  declaration that circumstances exist justifying the authorization of the  emergency use of an in vitro diagnostic tests for detection of SARS-CoV-2  virus and/or diagnosis of COVID-19 infection under section 564(b)(1) of  the Act, 21 U  S C  040BXW-5(N)(5), unless the authorization is terminated  or revoked sooner  The test has been validated but independent review by FDA  and CLIA is pending  Test performed using [employer] Gene[employer]: This RT-PCR assay targets N2,  a region unique to SARS-CoV-2  A conserved region in the E-gene was chosen  for pan-Sarbecovirus detection which includes SARS-CoV-2  According to CMS-2020-01-R, this platform meets the definition of high-throughput technology  Lactic acid [[id number]]  (Abnormal) Collected: 10/07/22 0742    Lab Status: Final result Specimen: Blood from Arm, Left Updated: 10/07/22 0834     LACTIC ACID 3 7 mmol/L     Narrative:      Result may be elevated if tourniquet was used during collection      Lactic acid 2 Hours [[id number]]     Lab Status: No result Specimen: Blood     Procalcitonin [[id number]]  (Abnormal) Collected: 10/07/22 0742    Lab Status: Final result Specimen: Blood from Arm, Left Updated: 10/07/22 6365 Procalcitonin 0 28 ng/ml     Comprehensive metabolic panel [[id number]]  (Abnormal) Collected: 10/07/22 0742    Lab Status: Final result Specimen: Blood from Arm, Left Updated: 10/07/22 0513     Sodium 135 mmol/L      Potassium 4 8 mmol/L      Chloride 100 mmol/L      CO2 21 mmol/L      ANION GAP 14 mmol/L      BUN 25 mg/dL      Creatinine 1 34 mg/dL      Glucose 155 mg/dL      Calcium 9 4 mg/dL      AST 20 U/L      ALT 21 U/L      Alkaline Phosphatase 94 U/L      Total Protein 7 2 g/dL      Albumin 3 7 g/dL      Total Bilirubin 1 80 mg/dL      eGFR 55 ml/min/1 73sq m     Narrative:      [place] guidelines for Chronic Kidney Disease (CKD):     Stage 1 with normal or high GFR (GFR > 90 mL/min/1 73 square meters)    Stage 2 Mild CKD (GFR = 60-89 mL/min/1 73 square meters)    Stage 3A Moderate CKD (GFR = 45-59 mL/min/1 73 square meters)    Stage 3B Moderate CKD (GFR = 30-44 mL/min/1 73 square meters)    Stage 4 Severe CKD (GFR = 15-29 mL/min/1 73 square meters)    Stage 5 End Stage CKD (GFR <15 mL/min/1 73 square meters)  Note: GFR calculation is accurate only with a steady state creatinine    HS Troponin 0hr (reflex protocol) [[id number]]  (Abnormal) Collected: 10/07/22 0742    Lab Status: Final result Specimen: Blood from Arm, Left Updated: 10/07/22 0819     hs TnI 0hr 137 ng/L     HS Troponin I 4hr [[id number]]     Lab Status: No result Specimen: Blood     Protime-INR [[id number]]  (Normal) Collected: 10/07/22 0742    Lab Status: Final result Specimen: Blood from Arm, Left Updated: 10/07/22 0810     Protime 12 8 seconds      INR 0 90    APTT [[id number]]  (Normal) Collected: 10/07/22 0742    Lab Status: Final result Specimen: Blood from Arm, Left Updated: 10/07/22 0810     PTT 26 seconds     Blood culture #1 [[id number]] Collected: 10/07/22 0745    Lab Status:  In process Specimen: Blood from Arm, Right Updated: 10/07/22 0804    CBC and differential [[id number]]  (Abnormal) Collected: 10/07/22 0742    Lab Status: Final result Specimen: Blood from Arm, Left Updated: 10/07/22 0755     WBC 22 68 Thousand/uL      RBC 5 85 Million/uL      Hemoglobin 17 5 g/dL      Hematocrit 49 7 %      MCV 85 fL      MCH 29 9 pg      MCHC 35 2 g/dL      RDW 12 5 %      MPV 11 3 fL      Platelets 419 Thousands/uL      nRBC 0 /100 WBCs      Neutrophils Relative 86 %      Immat GRANS % 1 %      Lymphocytes Relative 7 %      Monocytes Relative 6 %      Eosinophils Relative 0 %      Basophils Relative 0 %      Neutrophils Absolute 19 56 Thousands/µL      Immature Grans Absolute 0 13 Thousand/uL      Lymphocytes Absolute 1 61 Thousands/µL      Monocytes Absolute 1 35 Thousand/µL      Eosinophils Absolute 0 00 Thousand/µL      Basophils Absolute 0 03 Thousands/µL     Blood culture #2 [[id number]] Collected: 10/07/22 0742    Lab Status: In process Specimen: Blood from Arm, Left Updated: 10/07/22 0751                 XR chest portable   ED Interpretation by Chad Polanco MD (10/07 7432)   Abnormal   Multifocal pneumonia      Final Result by Lamonte Hi MD (10/07 7196)      Multifocal bilateral lung opacity which could be due to edema and/or pneumonia                    Workstation performed: [id number]         CT chest wo contrast    (Results Pending)              Procedures  ECG 12 Lead Documentation Only    Date/Time: 10/7/2022 7:46 AM  Performed by: Chad Polanco MD  Authorized by: Chad Polanco MD     Indications / Diagnosis:  Shortness of breath  ECG reviewed by me, the ED Provider: yes    Patient location:  ED  Previous ECG:     Previous ECG:  Unavailable  Interpretation:     Interpretation: non-specific    Rate:     ECG rate:  115    ECG rate assessment: tachycardic    Rhythm:     Rhythm: sinus tachycardia    Ectopy:     Ectopy: none    QRS:     QRS axis:  Normal    QRS intervals:  Normal  Conduction:     Conduction: normal    ST segments:     ST segments:  Non-specific Depression:  V4, aVF and V5             ED Course  ED Course as of 10/07/22 1013   Fri Oct 07, 2022   3079 Patient 90% on room air on re-evaluation  Started on 2 L NC    0842 Creatinine(!): 1 34  No prior for comparison    0907 hs TnI 0hr(!): 137  Discussed with cardiology who reviewed EKG  Agrees no STEMI criteria  Suspect secondary to sepsis  Monitor   0653 Patient 89% on 2 L NC on re-evaluation  Increased to 5L with improvement only to 90%  Midflow ordered             HEART Risk Score    Flowsheet Row Most Recent Value   Heart Score Risk Calculator    History 0 Filed at: 10/07/2022 0745   ECG 1 Filed at: 10/07/2022 0745   Age 1 Filed at: 10/07/2022 0745   Risk Factors 0 Filed at: 10/07/2022 0745   Troponin 0 Filed at: 10/07/2022 0745   HEART Score 2 Filed at: 10/07/2022 0745                     Initial Sepsis Screening     Row Name 10/07/22 0841                Is the patient's history suggestive of a new or worsening infection? Yes (Proceed)  -EE        Suspected source of infection pneumonia  -EE        Are two or more of the following signs & symptoms of infection both present and new to the patient? Yes (Proceed)  -EE        Indicate SIRS criteria Tachycardia > 90 bpm;Tachypnea > 20 resp per min;Leukocytosis (WBC > 74446 IJL)  -EE        If the answer is yes to both questions, suspicion of sepsis is present --        If severe sepsis is present AND tissue hypoperfusion perists in the hour after fluid resuscitation or lactate > 4, the patient meets criteria for SEPTIC SHOCK --        Are any of the following organ dysfunction criteria present within 6 hours of suspected infection and SIRS criteria that are NOT considered to be chronic conditions?  Yes  -EE        Organ dysfunction Lactate > 2 0 mmol/L  -EE        Date of presentation of severe sepsis 10/07/22  -EE        Time of presentation of severe sepsis 0842  -EE        Tissue hypoperfusion persists in the hour after crystalloid fluid administration, evidenced, by either: --        Was hypotension present within one hour of the conclusion of crystalloid fluid administration? No  -EE        Date of presentation of septic shock --        Time of presentation of septic shock --              User Key  (r) = Recorded By, (t) = Taken By, (c) = Cosigned By    [address] Name Provider Type    EE Willis Rivera MD Physician                              MDM  Number of Diagnoses or Management Options  Acute respiratory insufficiency: new and requires workup  Elevated troponin: new and requires workup  Multifocal pneumonia: new and requires workup  Sepsis [facility]): new and requires workup  Diagnosis management comments: 60year old male presents for evaluation of shortness of breath  Diagnosed with pneumonia at urgent care yesterday  3 doses of amoxicillin prior to arrival  CXR concerning for multifocal pneumonia  COVID/flu/RSV negative  Rocephin and azithromycin given  Increasing oxygen needs while in the ED  Placed on midflow  Patient admitted to step down unit for further evaluation and management          Amount and/or Complexity of Data Reviewed  Clinical lab tests: ordered and reviewed  Tests in the radiology section of CPT®: ordered and reviewed  Independent visualization of images, tracings, or specimens: yes    Patient Progress  Patient progress: stable      Disposition  Final diagnoses:   Multifocal pneumonia   Sepsis ([facility] [address] )   Elevated troponin   Acute respiratory insufficiency     Time reflects when diagnosis was documented in both MDM as applicable and the Disposition within this note     Time User Action Codes Description Comment    10/7/2022  7:59 AM Robert Able Add [J18 9] Multifocal pneumonia     10/7/2022  8:41 AM TedMary Jo ferreira Caleb Add [A41 9] Sepsis ([facility] [address] )     10/7/2022  8:41 AM Robert Able Add [R77 8] Elevated troponin     10/7/2022 10:12 AM Robert Able Add [R06 89] Acute respiratory insufficiency       ED Disposition     ED Disposition   Admit    Condition   Stable    Date/Time   Fri Oct 7, 2022 10:09 AM    Comment   Case was discussed with DESIRAE and the patient's admission status was agreed to be Admission Status: inpatient status to the service of Dr Eh verma   Follow-up Information    None         Patient's Medications   Discharge Prescriptions    No medications on file       No discharge procedures on file      PDMP Review     None          ED Provider  Electronically Signed by           Tramaine Smith MD  10/07/22 1013

## 2022-10-07 NOTE — CONSULTS
Consultation - Cardiology   Liat Gale 60 y o  male MRN: [id number]  Unit/Bed#: -01 Encounter: [id number]  10/07/22  2:14 PM    Assessment:  1  Acute hypoxic respiratory failure  2  Acute HFpEF   3  Severe MR with posterior leaflet flail  4  Multilobar pneumonia   5  CON    Plan:  - Case was discussed with CT surgery and ICU attending for urgent transfer to [facility] for cardiac catheterization with IABP placement followed by CT surgery for MVR  - Give IV Lasix 80 mg x 1 now - discontinue further IVF  - Continue BiPAP for increased O2 requirement  - Start IV nitroprusside gtt for afterload reduction  - Sepsis/PNA management as per critical care team    Diagnostics:  HS troponin trend: 137>124>114  NTproBNP: 2080  EKG: Sinus tachycardia, nonspecific ST abnormality  Telemetry review: Sinus tachycardia, -160bpm    History of Present Illness   Physician Requesting Consult: Brittany Peter DO  Reason for Consult / Principal Problem:   HPI: Liat Gale is a 60y o  year old male without significant past medical history who presents with worsening dyspnea and persistent cough with pinkish/blood-tinged sputum  He awoke with dyspnea and cough which progressed until the afternoon and presented to his local urgent care where he was dx with PNA and started on abx/ inhalers without improvement  Presented to [address] [place] [address] ED for evaluation  ED CXR showed multilobar PNA vs pulmonary edema  Presenting lab work significant for lactic acid 2 7, WBC 22 68, creatinine 1 34, troponin 137>124>114 and NT proBNP 2080  COVID/RSV/influenza swab negative x2  Patient was treated with aggressive IVF  EKG shows sinus tachycardia with nonspecific ST abnormalities  Telemetry showed sinus tachycardia with heart rates in the 130-180s  Patient was notably hypoxic on arrival w/ SPO2 85% despite midflow and started on HFNC and transferred to ICU service   TTE was ordered due to troponemia and new systolic murmur, showed EF 70% and severe MR with posterior leaflet flail  Inpatient consult to Cardiology  Consult performed by: Johnice Gowers, PA-C  Consult ordered by: KYLAH Antoine          Review of Systems   Constitutional: Positive for fatigue  Negative for appetite change, chills, diaphoresis and fever  Respiratory: Positive for cough and shortness of breath  Negative for chest tightness  Cardiovascular: Negative for chest pain, palpitations and leg swelling  Gastrointestinal: Negative for diarrhea, nausea and vomiting  Endocrine: Negative for cold intolerance and heat intolerance  Genitourinary: Negative for difficulty urinating, dysuria and enuresis  Musculoskeletal: Negative for arthralgias, back pain and gait problem  Allergic/Immunologic: Negative for environmental allergies and food allergies  Neurological: Negative for dizziness, facial asymmetry and headaches  Hematological: Negative for adenopathy  Does not bruise/bleed easily  Psychiatric/Behavioral: Negative for agitation, behavioral problems and confusion  Historical Information   Past Medical History:   Diagnosis Date    Severe mitral regurgitation      History reviewed  No pertinent surgical history  Social History     Substance and Sexual Activity   Alcohol Use Not Currently     Social History     Substance and Sexual Activity   Drug Use Not Currently     Social History     Tobacco Use   Smoking Status Never Smoker   Smokeless Tobacco Never Used       Family History: History reviewed  No pertinent family history  Meds/Allergies   current meds:   No current facility-administered medications for this encounter       Facility-Administered Medications Ordered in Other Encounters   Medication Dose Route Frequency    amiodarone (CORDARONE) 150 mg/3 mL perfusion syringe  150 mg Perfusion Once    chlorhexidine (PERIDEX) 0 12 % oral rinse 15 mL  15 mL Swish & Spit Q12H [facility]    EPINEPHrine 12 mcg/mL in sodium chloride 0 9% 10 mL syringe   mcg Intravenous Once    EPINEPHrine 3000 mcg (STANDARD CONCENTRATION) in sodium chloride 0 9% 250 mL  1-10 mcg/min Intravenous Once    heparin 10,000 unit/10 mL perfusion syringe 10,000 Units  10,000 Units Perfusion Once    heparin 400 units/kg perfusion syringe (1000 unit/mL) 400 Units/kg = 34,500 Units  400 Units/kg Perfusion Once    insulin regular 100 units in sodium chloride 0 9% 100 mL  100 Units Intravenous Once    magnesium sulfate 2 g/4 mL perfusion syringe  2 g Perfusion Once    [MAR Hold] mupirocin (BACTROBAN) 2 % nasal ointment 1 application  1 application Nasal Once    niCARdipine (CARDENE) 25 mg (STANDARD CONCENTRATION) in sodium chloride 0 9% 250 mL  1-15 mg/hr Intravenous Once    niCARdipine 100 mcg/mL in sodium chloride 0 9% 10 mL syringe  100-1,000 mcg Intravenous Once    phenylephine (KEIRY-SYNEPHRINE) 200 mcg/mL in sodium chloride 0 9% 250 mL   mcg/min Intravenous Once    phenylephrine 10,000 mcg/20 mL perfusion syringe (500 mcg/mL) 10,000 mcg  10,000 mcg Perfusion Once    phenylephrine 200 mcg/mL in sodium chloride 0 9% 10 mL syringe  200-2,000 mcg Intravenous Once     No Known Allergies    Objective   Vitals: Blood pressure 138/91, pulse (!) 130, temperature (!) 101 12 °F (38 4 °C), resp  rate (!) 26, height 6' 1" (1 854 m), weight 86 2 kg (190 lb), SpO2 98 %  , Body mass index is 25 07 kg/m² ,   Orthostatic Blood Pressures      Flowsheet Row Most Recent Value   Blood Pressure 138/91 filed at 10/07/2022 1408   Patient Position - Orthostatic VS Lying filed at 10/07/2022 1215            Systolic (36OVG), HFO:016 , Min:118 , GOU:468     Diastolic (02OGM), WKW:12, Min:77, Max:93        Intake/Output Summary (Last 24 hours) at 10/7/2022 1414  Last data filed at 10/7/2022 1349  Gross per 24 hour   Intake --   Output 225 ml   Net -225 ml       Invasive Devices  Report      Peripheral Intravenous Line  Duration             Peripheral IV 10/07/22 Left Antecubital <1 day    Peripheral IV 10/07/22 Right Forearm <1 day              Drain  Duration             Urethral Catheter Temperature probe 16 Fr  <1 day                        Physical Exam:  GEN: +BiPAP Alert and oriented x 3, in no acute distress  Well appearing and well nourished  HEENT: Sclera anicteric, conjunctivae pink, mucous membranes moist  Oropharynx clear  NECK: Supple, no carotid bruits, no significant JVD  Trachea midline, no thyromegaly  HEART: +tachycardic, 3/6 systolic murmur mitral area, normal S1 and S2, no clicks, gallops or rubs  PMI nondisplaced, no thrills  LUNGS:  +coarse breath sounds bilaterally; no wheezes, rales, or rhonchi  No increased work of breathing or signs of respiratory distress  ABDOMEN: Soft, nontender, nondistended, normoactive bowel sounds  EXTREMITIES: +1 LE edema  Skin warm and well perfused, no clubbing, cyanosis  NEURO: No focal findings  Normal speech  Mood and affect normal    SKIN: Normal without suspicious lesions on exposed skin        Lab Results:     Troponins:       CBC with diff:   Results from last 7 days   Lab Units 10/07/22  0742   WBC Thousand/uL 22 68*   HEMOGLOBIN g/dL 17 5*   HEMATOCRIT % 49 7*   MCV fL 85   PLATELETS Thousands/uL 322   MCH pg 29 9   MCHC g/dL 35 2   RDW % 12 5   MPV fL 11 3   NRBC AUTO /100 WBCs 0         CMP:   Results from last 7 days   Lab Units 10/07/22  0742   POTASSIUM mmol/L 4 8   CHLORIDE mmol/L 100   CO2 mmol/L 21   BUN mg/dL 25   CREATININE mg/dL 1 34*   CALCIUM mg/dL 9 4   AST U/L 20   ALT U/L 21   ALK PHOS U/L 94   EGFR ml/min/1 73sq m 55

## 2022-10-07 NOTE — QUICK NOTE
The 30ml/kg fluid bolus was not given to the patient despite hypotension and/or significantly elevated lactate of ? 4 and/or presence of septic shock due to: Concern for fluid/volume overload  The patient will be administered 2L NS (ER) of crystalloid fluid instead  Orders for this have been placed in Epic  The patient may receive additional colloid or crystalloid fluids thereafter based on clinical condition       [place] Failing

## 2022-10-07 NOTE — RESPIRATORY THERAPY NOTE
RT Protocol Note  Bard Jessica 60 y o  male MRN: [id number]  Unit/Bed#: -01 Encounter: [id number]    Assessment    Principal Problem:    Acute respiratory failure with hypoxia ([facility] [address] )  Active Problems:    Severe sepsis (HCC)    Multifocal pneumonia    High anion gap metabolic acidosis    Elevated troponin    Elevated d-dimer    CON (acute kidney injury) ([facility] [address] )      Home Pulmonary Medications:       History reviewed  No pertinent past medical history  Social History     Socioeconomic History    Marital status: /Civil Union     Spouse name: None    Number of children: None    Years of education: None    Highest education level: None   Occupational History    None   Tobacco Use    Smoking status: Never Smoker    Smokeless tobacco: Never Used   Substance and Sexual Activity    Alcohol use: Not Currently    Drug use: Not Currently    Sexual activity: None   Other Topics Concern    None   Social History Narrative    None     Social Determinants of Health     Financial Resource Strain: Not on file   Food Insecurity: Not on file   Transportation Needs: Not on file   Physical Activity: Not on file   Stress: Not on file   Social Connections: Not on file   Intimate Partner Violence: Not on file   Housing Stability: Not on file       Subjective         Objective    Physical Exam:   Assessment Type: Assess only  General Appearance: Awake  Respiratory Pattern: Tachypneic, Shallow  Chest Assessment: Chest expansion symmetrical  Bilateral Breath Sounds: Diminished  Cough: None    Vitals:  Blood pressure 125/93, pulse (!) 141, temperature 98 9 °F (37 2 °C), temperature source Axillary, resp  rate (!) 31, height 6' 1" (1 854 m), weight 86 2 kg (190 lb), SpO2 99 %  Imaging and other studies: I have personally reviewed pertinent reports  Plan    Respiratory Plan:  Moderate/Severe Distress pathway  Airway Clearance Plan: Incentive Spirometer

## 2022-10-07 NOTE — RESPIRATORY THERAPY NOTE
Resp care   10/07/22 1600   Respiratory Assessment   Resp Comments Pt transfered from [facility] for cath and possible OHS  Pt placed on bipap in cath lab  Was then transported to OR holding area on bipap without incident  Ipap decreased to 10/epap to 5  Pts VT >800cc on previous settings  PT alert and cooperative with sat >98% while on bipap  PT placed on 8lpm Simple mask when transfered to OR     Additional Assessments   SpO2 98 %

## 2022-10-07 NOTE — H&P
H&P Exam - [address] [place] [address] 60 y o  male MRN: [id number]  Unit/Bed#: -01 Encounter: [id number]      -------------------------------------------------------------------------------------------------------------  Chief Complaint:  Shortness of breath    History of Present Illness   HX and PE limited by:  Acute respiratory failure  Katarina Whitlock is a 60 y o  male who presents with acute onset of shortness of breath  On Tuesday patient's father passed away at on Thursday he went to his father's home to clean out some closets an make arrangements with his brothers  After several heated arguments patient left emotional and had some acute shortness of breath at that time  Patient denies any chest pain and developed a cough and shortness of breath which limited his ambulation at home on Thursday evening  On Friday morning patient woke at 6:30 a m  Telling wife he was up all night and could not breathe  He came to the emergency department and had multifocal pneumonia with a white cell count of 22 was treated as severe sepsis for elevated lactate given 30 cc/kilos of fluids  Patient rapidly declined from a pulmonary standpoint and given he had an acute murmur S stat 2D echocardiogram was done  He had a normal EF but a blown posterior leaflet of his mitral valve with severe mitral regurgitation  Patient has no past medical history no diabetes hypertension and is an active person  He runs several times a week  Dara Soulier History obtained from spouse and the patient   -------------------------------------------------------------------------------------------------------------  Assessment and Plan:    Neuro:    Diagnosis:   Anxiety likely due to acute medical condition patient responded well to morphine would use morphine for anxiety and preload reduction as needed      CV:    Diagnosis:  Acute mitral regurgitation secondary to ruptured leaflet  o Plan:  Transfer to [place] for cardiac catheterization preoperatively for mitral valve replacement   Diagnosis:  Acute heart failure secondary to mitral regurgitation  o Plan:  Lasix 40 mg IV x2 and nitroprusside drip to decrease preload in the setting of acute heart failure in preparation for upcoming surgery, multidisciplinary conference with Cardiology CT surgery and Interventional Cardiology  Will do preop catheterization to rule out coronary artery disease prior to surgery  A line for closer blood pressure monitoring      Pulm:   Diagnosis:  Acute hypoxic respiratory failure secondary to pulmonary edema  o Plan:  Patient is doing well on BiPAP likely be able to deescalate his respiratory support after adequate diuresis and Nipride  Low threshold to intubation prior to surgery    Given his acute heart failure and ruptured valve prefer not to induce him for intubation unless needed at [address] [place] [address] as we have no advanced cardiac support here      GI:    Diagnosis:  NPO        :    Diagnosis:  Acute kidney injury  o Plan:  Likely secondary to poor perfusion from acute heart failure, patient adequately diuresing with Lasix will continue to trend urine output placed folate for adequate I's and O's    F/E/N:    Plan:  Replace electrolytes as needed      Heme/Onc:    Diagnosis:  No active issues, leukocytosis likely reactive although patient does have a low-grade temperature flu and COVID and RSV negative x2  o Plan:  Patient received blood cultures will follow continue empiric antibiotics preoperatively    Endo:    Diagnosis:  No active issues    ID:    Diagnosis:  Patient was originally diagnosed with multifocal pneumonia flu and RSV are negative send urine antigens however in the setting of this acute finding of mitral valve rupture doubt the patient has any infection will follow up cultures and continue antibiotics for now        MSK/Skin:    Diagnosis:  No active issues    Disposition: Discharge to [facility] for urgent surgical evaluation   Code Status: Level 1 - Full Code  --------------------------------------------------------------------------------------------------------------  Review of Systems   Constitutional: Negative  HENT: Negative  Eyes: Negative  Respiratory: Positive for cough and shortness of breath  Cardiovascular: Negative  Gastrointestinal: Negative  Endocrine: Negative  Genitourinary: Negative  Allergic/Immunologic: Negative  Neurological: Negative  Hematological: Negative  Psychiatric/Behavioral: Negative  A 12-point, complete review of systems was reviewed and negative except as stated above     Physical Exam  Constitutional:       Appearance: He is well-developed  HENT:      Head: Normocephalic and atraumatic  Eyes:      Pupils: Pupils are equal, round, and reactive to light  Cardiovascular:      Rate and Rhythm: Normal rate and regular rhythm  Heart sounds: No murmur heard  Pulmonary:      Effort: Pulmonary effort is normal  No respiratory distress  Breath sounds: Rhonchi present  No wheezing or rales  Abdominal:      Palpations: Abdomen is soft  Musculoskeletal:      Cervical back: Normal range of motion and neck supple  Skin:     General: Skin is warm and dry  Capillary Refill: Capillary refill takes 2 to 3 seconds  Neurological:      Mental Status: He is alert and oriented to person, place, and time     Psychiatric:         Mood and Affect: Mood is anxious        --------------------------------------------------------------------------------------------------------------  Vitals:   Vitals:    10/07/22 1300 10/07/22 1333 10/07/22 1345 10/07/22 1357   BP: 125/93  142/82    BP Location:       Pulse: (!) 144  (!) 131 (!) 132   Resp:   (!) 38 (!) 32   Temp:   (!) 100 94 °F (38 3 °C) (!) 100 94 °F (38 3 °C)   TempSrc:       SpO2:  98% 98% 97%   Weight: 86 2 kg (190 lb)      Height: 6' 1" (1 854 m)        Temp  Min: 97 3 °F (36 3 °C)  Max: 100 94 °F (38 3 °C)  IBW (Ideal Body Weight): 79 9 kg  Height: 6' 1" (185 4 cm)  Body mass index is 25 07 kg/m²  SvO2:       Laboratory and Diagnostics:  Results from last 7 days   Lab Units 10/07/22  0742   WBC Thousand/uL 22 68*   HEMOGLOBIN g/dL 17 5*   HEMATOCRIT % 49 7*   PLATELETS Thousands/uL 322   NEUTROS PCT % 86*   MONOS PCT % 6     Results from last 7 days   Lab Units 10/07/22  0742   SODIUM mmol/L 135   POTASSIUM mmol/L 4 8   CHLORIDE mmol/L 100   CO2 mmol/L 21   ANION GAP mmol/L 14*   BUN mg/dL 25   CREATININE mg/dL 1 34*   CALCIUM mg/dL 9 4   GLUCOSE RANDOM mg/dL 155*   ALT U/L 21   AST U/L 20   ALK PHOS U/L 94   ALBUMIN g/dL 3 7   TOTAL BILIRUBIN mg/dL 1 80*     Results from last 7 days   Lab Units 10/07/22  0742   MAGNESIUM mg/dL 1 8   PHOSPHORUS mg/dL 4 1      Results from last 7 days   Lab Units 10/07/22  0742   INR  0 90   PTT seconds 26          Results from last 7 days   Lab Units 10/07/22  1045 10/07/22  0742   LACTIC ACID mmol/L 4 1* 3 7*     ABG:    VBG:    Results from last 7 days   Lab Units 10/07/22  0742   PROCALCITONIN ng/ml 0 28*       Micro:        EKG:  No active ischemia sinus tachycardia  Imaging: I have personally reviewed pertinent films in PACS    Historical Information   Past Medical History:   Diagnosis Date    Severe mitral regurgitation      History reviewed  No pertinent surgical history  Social History   Social History     Substance and Sexual Activity   Alcohol Use Not Currently     Social History     Substance and Sexual Activity   Drug Use Not Currently     Social History     Tobacco Use   Smoking Status Never Smoker   Smokeless Tobacco Never Used     Exercise History:  Every day runner  Family History:   History reviewed  No pertinent family history    I have reviewed this patient's family history and commented on sigificant items within the HPI      Medications:  Current Facility-Administered Medications   Medication Dose Route Frequency    albuterol (2 5 mg/3 mL) 0 083 % inhalation solution **ADS Override Pull**        [START ON 10/8/2022] cefTRIAXone (ROCEPHIN) IVPB (premix in dextrose) 1,000 mg 50 mL  1,000 mg Intravenous Q24H    And    [START ON 10/8/2022] azithromycin (ZITHROMAX) 500 mg in sodium chloride 0 9% 250mL IVPB 500 mg  500 mg Intravenous Q24H    heparin (porcine) subcutaneous injection 5,000 Units  5,000 Units Subcutaneous Q8H [address]    ipratropium (ATROVENT) 0 02 % inhalation solution 0 5 mg  0 5 mg Nebulization Q6H    ipratropium-albuterol (DUO-NEB) 0 5-2 5 mg/3 mL inhalation solution **ADS Override Pull**        levalbuterol (XOPENEX) inhalation solution 1 25 mg  1 25 mg Nebulization Q6H    nitroPRUSside (NIPRIDE) 50,000 mcg in dextrose 5 % 250 mL infusion  0 1 mcg/kg/min Intravenous Titrated     Home medications:  Prior to Admission Medications   Prescriptions Last Dose Informant Patient Reported? Taking? Ascorbic Acid (vitamin C) 1000 MG tablet   Yes No   Sig: every 24 hours   Cyanocobalamin (Vitamin B-12) 500 MCG LOZG   Yes No   Sig: every 24 hours   Lysine 1000 MG TABS   Yes No   Multiple Vitamin (MULTI-VITAMIN DAILY PO)   Yes No   Omega-3 Fatty Acids (Fish Oil) 500 MG CAPS   Yes No   Sig: Every 12 hours      Facility-Administered Medications: None     Allergies:  No Known Allergies  ------------------------------------------------------------------------------------------------------------  Advance Directive and Living Will:      Power of :    POLST:    ------------------------------------------------------------------------------------------------------------  Anticipated Length of Stay is > 2 midnights    Care Time Delivered:   Upon my evaluation, this patient had a high probability of imminent or life-threatening deterioration due to Acute heart failure, which required my direct attention, intervention, and personal management    I have personally provided 45 minutes (One to 145) of critical care time, exclusive of procedures, teaching, family meetings, and any prior time recorded by providers other than myself  Nohemi Wiseman DO        Portions of the record may have been created with voice recognition software  Occasional wrong word or "sound a like" substitutions may have occurred due to the inherent limitations of voice recognition software    Read the chart carefully and recognize, using context, where substitutions have occurred

## 2022-10-07 NOTE — ASSESSMENT & PLAN NOTE
· Trop 137 on arrival   · Concern for type 2 demand ischemia in the setting of severe sepsis, acute hypoxic respiratory failure   · Admit to ICU, continuous tele   · BNP 2080  · Check stat echo   · Check lipid panel, hba1c

## 2022-10-07 NOTE — PROCEDURES
Arterial Line Insertion    Date/Time: 10/7/2022 2:17 PM  Performed by: KYLAH Grant  Authorized by: KYLAH Grant     Patient location:  ICU  Consent:     Consent obtained:  Emergent situation and verbal    Consent given by:  Patient and spouse    Risks discussed:  Bleeding, infection, ischemia, pain and repeat procedure  Universal protocol:     Procedure explained and questions answered to patient or proxy's satisfaction: yes      Relevant documents present and verified: yes      Site/side marked: yes      Immediately prior to procedure a time out was called: yes      Patient identity confirmed:  Verbally with patient, arm band and hospital-assigned identification number  Indications:     Indications: hemodynamic monitoring, multiple ABGs and frequent labs / infusion    Pre-procedure details:     Skin preparation:  Chlorhexidine    Preparation: Patient was prepped and draped in sterile fashion    Anesthesia (see MAR for exact dosages): Anesthesia method:  None  Procedure details:     Location / Tip of Catheter:  Radial    Laterality:  Right    Dylon's test performed: yes      Dylon's test abnormal: yes      Needle gauge:  20 G    Placement technique:  Percutaneous    Number of attempts:  5 or more    Successful placement: yes      Transducer: waveform confirmed    Post-procedure details:     Post-procedure:  Secured with tape, sterile dressing applied, sutured and wrist guard applied    CMS:  Unchanged    Patient tolerance of procedure:   Tolerated well, no immediate complications

## 2022-10-07 NOTE — ASSESSMENT & PLAN NOTE
· CT chest 10/7 -- Extensive right greater than left groundglass and alveolar opacities are identified  Findings again may reflect multi lobar pneumonia or pulmonary edema or combination of both   Small right greater than left pleural effusions  · Pt started with cough intermittently productive of pink sputum, SOB, subjective fevers at home at approx 1030am in 10/6  · In the ER, tachycardic, tachypneic, hypoxic and with a leukocytosis, lactic acidosis, elevated procal of 0 28  · BC x2, sputum, MRSA, strep, legionella pending   · COVID negative   · Requiring bipap as above   · pulm toileting as appropriate   · abx as above

## 2022-10-07 NOTE — H&P
H&P Exam - Cardiothoracic Surgery   Sneha Boo 60 y o  male MRN: [id number]  Unit/Bed#: OR YASMINE Encounter: [id number]    Assessment/Plan     Assessment:  Severe mitral regurgitation  Lactic acidosis  Acute respiratory failure w/ hypoxia  Multifocal PNA  Metabolic Acidosis  Cardiogenic shock s/p IABP    Plan:  Risks and benefits of emergent mitral valve repair vs replacement were discussed in detail today with the patient  They understand and wish to proceed with further workup and ultimately surgical intervention  We have ordered preoperative laboratory studies  Pending the results of these tests, they will be scheduled for surgery now/emergently with JOVANNA Wallis        The patient was comfortable with our recommendations, and their questions were answered to their satisfaction  We will continue to evaluate the patient daily with further recommendations as work up is completed  Thank you for allowing us to participate in the care of this patient    Lorraine Cheek  1:40 PM  10/07/22    History of Present Illness   HPI:  Sneha Boo is a 60y o  year old male who presents with presents for evaluation of acute severe MR  Patient went to urgent care w/i past few days for sough/SOB & was dx w/ PNA & given abx (Amoxicillin) w/o improvement therefore [facility]  [employer] [facility] 10/7  CXR w/ PNA vs edema, CT chest w/ same  Started on abx (Rocephin/Azithromycin), COVID (-), placed on midflow in ED & transferred to ICU for management  (+) murmur heard on exam w/ acute worsening of respiratory status from HFNC to BiPAP w/ IVF running  TTE w/ wide-open severe MR  Started on Nitroprusside & transferred [facility] for diagnostic cath/IABP placement & cardiac sx consultation      Upon examination today, Mr Shantell Euceda reports he is feeling well  Admits to no chest pains & improved SOB since being on BiPAP   Denies palpitations, LE edema b/l, orthopnea, PND, numbness/tinlging/paresthesias in UE or LE bilaterally, HA, lightheadeness, dizziness, presyncopal symptoms, hx syncopal events, N/V/D, hemoptysis, hematemesis, hematochezia, melena  Denies hx stroke, hx cancer w/ chest wall radiation, hx blood loss anemia, hx varicose veins or vein stripping      No PMH, was not seeing PCP routinely PTA  PSH includes left IHR (unsure mesh placement)  Denies FH of CAD/MI, HTN, HL, valvular disease, DM, aortic aneurysms, or SCD  Patient is avid runner  Lives in a home w/ wife  Does not use an assist device for ambulation  Drives  Denies current or prior use of tobacco, ETOH, or drug use  NKDA  No cardiac meds PTA  Other medications can be reviewed in the patient chart      Patient had no providers PTA  Review of Systems   Constitutional: Positive for activity change, diaphoresis and fatigue  Respiratory: Positive for chest tightness and shortness of breath  Cardiovascular: Negative for chest pain, palpitations and leg swelling  Gastrointestinal: Negative  Musculoskeletal: Negative  Neurological: Negative  Historical Information   Past Medical History:   Diagnosis Date   • Severe mitral regurgitation      Past Surgical History:   Procedure Laterality Date   • INGUINAL HERNIA REPAIR Left     unsure mesh placement     Social History   Social History     Substance and Sexual Activity   Alcohol Use Not Currently     Social History     Substance and Sexual Activity   Drug Use Not Currently     Social History     Tobacco Use   Smoking Status Never Smoker   Smokeless Tobacco Never Used     E-Cigarette/Vaping      E-Cigarette/Vaping Substances     Family History: as above    Meds/Allergies   PTA meds:   Prior to Admission Medications   Prescriptions Last Dose Informant Patient Reported? Taking?    Ascorbic Acid (vitamin C) 1000 MG tablet   Yes No   Sig: every 24 hours   Cyanocobalamin (Vitamin B-12) 500 MCG LOZG   Yes No   Sig: every 24 hours   Lysine 1000 MG TABS   Yes No   Multiple Vitamin (MULTI-VITAMIN DAILY PO)   Yes No Omega-3 Fatty Acids (Fish Oil) 500 MG CAPS   Yes No   Sig: Every 12 hours      Facility-Administered Medications: None     No Known Allergies    Objective   Vitals: There were no vitals taken for this visit  Invasive Devices  Report    Peripheral Intravenous Line  Duration           Peripheral IV 10/07/22 Left Antecubital <1 day    Peripheral IV 10/07/22 Right Forearm <1 day          Arterial Line  Duration           Arterial Line 10/07/22 Radial <1 day          Line  Duration           Arterial Sheath 8 Fr  Right Femoral <1 day          Drain  Duration           Urethral Catheter Temperature probe 16 Fr  <1 day                Physical Exam  Constitutional:       Appearance: Normal appearance  He is ill-appearing  Interventions: Face mask in place  HENT:      Head: Normocephalic and atraumatic  Neck:      Vascular: JVD present  Cardiovascular:      Rate and Rhythm: Regular rhythm  Tachycardia present  Chest Wall: PMI is not displaced  Pulses:           Radial pulses are 2+ on the right side and 2+ on the left side  Dorsalis pedis pulses are 2+ on the right side and 2+ on the left side  Heart sounds: Murmur heard  Diastolic murmur is present with a grade of 3/4  Pulmonary:      Effort: Tachypnea, accessory muscle usage and respiratory distress present  Breath sounds: Examination of the right-upper field reveals rhonchi and rales  Examination of the left-upper field reveals rhonchi and rales  Examination of the right-middle field reveals rhonchi and rales  Examination of the left-middle field reveals rhonchi and rales  Examination of the right-lower field reveals rhonchi and rales  Examination of the left-lower field reveals rhonchi and rales  Rhonchi and rales present  Abdominal:      General: Bowel sounds are normal  There is no distension  Palpations: Abdomen is soft  Skin:     General: Skin is cool        Comments: No VV to b/l LE, trace LE edema Neurological:      Mental Status: He is alert  Psychiatric:         Attention and Perception: Attention normal          Mood and Affect: Mood normal          Behavior: Behavior is cooperative  Lab Results:   Hb 17 5, plt 322, Cr 1 34, LFTs 10/7 t bili 1 8, PTT/Pt/INR WNL, procal 10/7 0 28, LA 10/7 (+) (max 4 1), troponin (+) (max 137), COVID 10/7 (-), BNP 2080  A1C, lipid panel needed  BC x2 10/7: pending  Sputum cx 10/7: pending  MRS 10/7: pending  I have personally reviewed pertinent reports  , CBC with diff:   Lab Results   Component Value Date    WBC 22 68 (H) 10/07/2022    HGB 16 7 10/07/2022    HCT 49 10/07/2022    MCV 85 10/07/2022     10/07/2022    MCH 29 9 10/07/2022    MCHC 35 2 10/07/2022    RDW 12 5 10/07/2022    MPV 11 2 10/07/2022    NRBC 0 10/07/2022   , BMP/CMP:   Lab Results   Component Value Date    SODIUM 135 10/07/2022    K 4 8 10/07/2022     10/07/2022    CO2 16 (L) 10/07/2022    BUN 25 10/07/2022    CREATININE 1 34 (H) 10/07/2022    GLUCOSE 203 (H) 10/07/2022    CALCIUM 9 4 10/07/2022    AST 20 10/07/2022    ALT 21 10/07/2022    ALKPHOS 94 10/07/2022    EGFR 55 10/07/2022   , Coags:   Lab Results   Component Value Date    PTT 26 10/07/2022    INR 1 01 10/07/2022   , Blood Culture:   Lab Results   Component Value Date    BLOODCX Received in Microbiology Lab  Culture in Progress  10/07/2022     Imaging: I have personally reviewed pertinent reports  and I have personally reviewed pertinent films in PACS  EKG, Pathology, and Other Studies: I have personally reviewed pertinent reports     and I have personally reviewed pertinent films in PACS    Cardiac cath 10/7: no CAD noted, IABP placed in OR    TTE 10/7: EF 70%, posterior MV leaflet flail w/ severe MR & no coaptation of MV     CXR 10/7: multifocal b/l lung opacities (edema vs PNA)     EKG 10/7: done     CT chest w/o 10/7: extensive R>L ground glass & alveolar opacities (PNA vs pulm edema), small R>L pleural effusions    Counseling / Coordination of Care  Total floor / unit time spent today 30 minutes  Greater than 50% of total time was spent with the patient and / or family counseling and / or coordination of care

## 2022-10-07 NOTE — EMTALA/ACUTE CARE TRANSFER
[facility] INTENSIVE CARE UNIT  [address] [address]  Dept: [phone]      ACUTE CARE TRANSFER CONSENT    NAME Katarina Whitlock DOB 1960                              MRN [id number]    I have been informed of my rights regarding examination, treatment, and transfer   by Dr Berto iRley DO    Benefits: Specialized equipment and/or services available at the receiving facility (Include comment)________________________ (CT surgery)    Risks: Potential for delay in receiving treatment, Potential deterioration of medical condition, Possible worsening of condition or death during transfer, Loss of IV, Increased discomfort during transfer      Consent for Transfer:  I acknowledge that my medical condition has been evaluated and explained to me by the treating physician or other qualified medical person and/or my attending physician, who has recommended that I be transferred to the service of  Accepting Physician: Richard at [address] Name, [address] : [facility]  The above potential benefits of such transfer, the potential risks associated with such transfer, and the probable risks of not being transferred have been explained to me, and I fully understand them  The doctor has explained that, in my case, the benefits of transfer outweigh the risks  I agree to be transferred  I authorize the performance of emergency medical procedures and treatments upon me in both transit and upon arrival at the receiving facility  Additionally, I authorize the release of any and all medical records to the receiving facility and request they be transported with me, if possible  I understand that the safest mode of transportation during a medical emergency is an ambulance and that the Hospital advocates the use of this mode of transport   Risks of traveling to the receiving facility by car, including absence of medical control, life sustaining equipment, such as oxygen, and medical personnel has been explained to me and I fully understand them  (ANAYELI CORRECT BOX BELOW)  [  ]  I consent to the stated transfer and to be transported by ambulance/helicopter  [  ]  I consent to the stated transfer, but refuse transportation by ambulance and accept full responsibility for my transportation by car  I understand the risks of non-ambulance transfers and I exonerate the Hospital and its staff from any deterioration in my condition that results from this refusal     X___________________________________________    DATE  10/07/22  TIME________  Signature of patient or legally responsible individual signing on patient behalf           RELATIONSHIP TO PATIENT_________________________          Provider Certification    NAME Brando Burgess                                         1960                              MRN [id number]    A medical screening exam was performed on the above named patient    Based on the examination:    Condition Necessitating Transfer Ruptured mitral valve leaflet     Patient Condition: The patient has been stabilized such that within reasonable medical probability, no material deterioration of the patient condition or the condition of the unborn child(katiana) is likely to result from the transfer    Reason for Transfer: Level of Care needed not available at this facility    Transfer Requirements: Facility B   · Space available and qualified personnel available for treatment as acknowledged by PACS  · Agreed to accept transfer and to provide appropriate medical treatment as acknowledged by       Vu  · Appropriate medical records of the examination and treatment of the patient are provided at the time of transfer   [address], [address] _______  · Transfer will be performed by qualified personnel from [facility]  and appropriate transfer equipment as required, including the use of necessary and appropriate life support measures  Provider Certification: I have examined the patient and explained the following risks and benefits of being transferred/refusing transfer to the patient/family:  General risk, such as traffic hazards, adverse weather conditions, rough terrain or turbulence, possible failure of equipment (including vehicle or aircraft), or consequences of actions of persons outside the control of the transport personnel, Unanticipated needs of medical equipment and personnel during transport, Risk of worsening condition      Based on these reasonable risks and benefits to the patient and/or the unborn child(katiana), and based upon the information available at the time of the patients examination, I certify that the medical benefits reasonably to be expected from the provision of appropriate medical treatments at another medical facility outweigh the increasing risks, if any, to the individuals medical condition, and in the case of labor to the unborn child, from effecting the transfer      X____________________________________________ DATE 10/07/22        TIME_______      ORIGINAL - SEND TO MEDICAL RECORDS   COPY - SEND WITH PATIENT DURING TRANSFER

## 2022-10-07 NOTE — DISCHARGE SUMMARY
Discharge Summary - Demian Crump 60 y o  male MRN: [id number]    Unit/Bed#: -01 Encounter: [id number]    Admission Date:   Admission Orders (From admission, onward)     Ordered        10/07/22 1131  INPATIENT ADMISSION  Once                        Admitting Diagnosis: Acute respiratory insufficiency [R06 89]  SOB (shortness of breath) [R06 02]  Elevated troponin [R77 8]  Sepsis ([address] ) [A41 9]  Multifocal pneumonia [J18 9]    HPI: Per H&P by Dr Maged Beach on 10/7/22:  "Demian Crump is a 60 y o  male who presents with acute onset of shortness of breath  On Tuesday patient's father passed away at on Thursday he went to his father's home to clean out some closets an make arrangements with his brothers  After several heated arguments patient left emotional and had some acute shortness of breath at that time  Patient denies any chest pain and developed a cough and shortness of breath which limited his ambulation at home on Thursday evening  On Friday morning patient woke at 6:30 a m  Telling wife he was up all night and could not breathe  He came to the emergency department and had multifocal pneumonia with a white cell count of 22 was treated as severe sepsis for elevated lactate given 30 cc/kilos of fluids  Patient rapidly declined from a pulmonary standpoint and given he had an acute murmur S stat 2D echocardiogram was done  He had a normal EF but a blown posterior leaflet of his mitral valve with severe mitral regurgitation  Patient has no past medical history no diabetes hypertension and is an active person  He runs several times a week "    Procedures Performed:   Orders Placed This Encounter   Procedures    ED ECG Documentation Only       Summary of Hospital Course: On arrival of the patient to the ICU, he became cool, clammy and with increased WOB and hypoxia  He was placed on bipap and given morphine with improvement  Stat echo was obtained, which demonstrated ruptured mitral valve   Stat consults to cardiology and CT surgery was ordered and decision was made to proceed with priority 1 transfer to [facility] for valve replacement  He was administered a total of 80 of lasix, an a line was placed and he was started on Nipride gtt  He remains HD stable  IV abx and cultures initiated for potential pna  Immediately prior to transport, he had a fever of 101 4 and received apap  Significant Findings, Care, Treatment and Services Provided: Ruptured Mitral Valve    Complications: Acute respiratory failure     Discharge Diagnosis: Ruptured Mitral Valve     Medical Problems             Resolved Problems  Date Reviewed: 10/7/2022   None                 Condition at Discharge: serious         Discharge instructions/Information to patient and family:   See after visit summary for information provided to patient and family  Provisions for Follow-Up Care:  See after visit summary for information related to follow-up care and any pertinent home health orders  PCP: Sushila Bean DO    Disposition: Transfer to [facility] for Cardiac eval    Planned Readmission: Yes to [facility] as above           Discharge Statement   I spent 35 minutes discharging the patient  This time was spent on the day of discharge  I had direct contact with the patient on the day of discharge  Additional documentation is required if more than 30 minutes were spent on discharge  Discharge Medications:  See after visit summary for reconciled discharge medications provided to patient and family

## 2022-10-07 NOTE — QUICK NOTE
Patient seen and examined in the emergency room, case was reviewed with the ER attending, patient had course breath sounds bilaterally    Noted to have elevated respiratory rate, tachycardia, working diagnosis at time of admission was multifocal pneumonia, noncontrast CT of the chest ordered, D-dimer and proBNP requested    Please note I was subsequently contacted by Respiratory Care Team for increasing oxygen needs up to high-flow O2, case was discussed with critical care team directly, plan of care altered for admission to Critical Care Service

## 2022-10-07 NOTE — ANESTHESIA PROCEDURE NOTES
Central Line Insertion  Performed by: Frances Cooper MD  Authorized by: Frances Cooper MD     Date/Time: 10/7/2022 5:19 PM  Catheter Type:  triple lumen  Consent: Written consent obtained  Risks and benefits: risks, benefits and alternatives were discussed  Consent given by: patient  Patient understanding: patient states understanding of the procedure being performed  Patient consent: the patient's understanding of the procedure matches consent given  Patient identity confirmed: verbally with patient and arm band  Indications: vascular access  Catheter size: 7 Fr  Patient position: Trendelenburg  Assessment: blood return through all ports and free fluid flow  Preparation: skin prepped with 2% chlorhexidine  Skin prep agent dried: skin prep agent completely dried prior to procedure  Sterile barriers: all five maximum sterile barriers used - cap, mask, sterile gown, sterile gloves, and large sterile sheet  Hand hygiene: hand hygiene performed prior to central venous catheter insertion  sterile gel and probe cover used in ultrasound-guided central venous catheter insertionultrasound permanent image saved  Pre-procedure: landmarks identified  Vessel of Catheter Tip End: svc  Number of attempts: 1  Successful placement: yes  Post-procedure: line sutured and dressing applied  Patient tolerance: Patient tolerated the procedure well with no immediate complications  Comments: Single skin and vessel puncture  Easy thread of wires  Wires confirmed in SVC with ultrasound  No apparent complications

## 2022-10-07 NOTE — ANESTHESIA PROCEDURE NOTES
Introducer/[facility]  Performed by: Donna Cobian MD  Authorized by: Donna Cobian MD     Date/Time: 10/7/2022 5:31 PM  Consent: Written consent obtained  Risks and benefits: risks, benefits and alternatives were discussed  Consent given by: patient  Patient understanding: patient states understanding of the procedure being performed  Patient consent: the patient's understanding of the procedure matches consent given  Patient identity confirmed: verbally with patient and arm band  Indications: vascular access  Location details: right internal jugular  Catheter size: 9 Fr  Assessment: blood return through all ports and free fluid flow  Preparation: skin prepped with 2% chlorhexidine  Skin prep agent dried: skin prep agent completely dried prior to procedure  Sterile barriers: all five maximum sterile barriers used - cap, mask, sterile gown, sterile gloves, and large sterile sheet  Hand hygiene: hand hygiene performed prior to central venous catheter insertion  Ultrasound guidance: yes  ultrasound permanent image saved  Pre-procedure: landmarks identified  Number of attempts: 1  Successful placement: yes  Post-procedure: line sutured and dressing applied  Patient tolerance: Patient tolerated the procedure well with no immediate complications  Comments: Single skin and vessel puncture  Easy thread of wires  Wires confirmed in SVC with ultrasound  No apparent complications

## 2022-10-07 NOTE — ANESTHESIA PREPROCEDURE EVALUATION
Procedure:  REPLACEMENT VALVE MITRAL (MVR) VS  repair (N/A Chest)    Relevant Problems   CARDIO   (+) Acute mitral valve rupture (HCC)   (+) Severe mitral regurgitation      /RENAL   (+) CON (acute kidney injury) ([address] )      PULMONARY   (+) Acute respiratory failure with hypoxia (HCC)   (+) Multifocal pneumonia     Lab Results   Component Value Date    SODIUM 135 10/07/2022    K 4 8 10/07/2022    BUN 25 10/07/2022    CREATININE 1 34 (H) 10/07/2022    EGFR 55 10/07/2022    GLUCOSE 203 (H) 10/07/2022     No results found for: HGBA1C    Lab Results   Component Value Date    HGB 16 7 10/07/2022    HGB 17 5 (H) 10/07/2022     10/07/2022     10/07/2022     Lab Results   Component Value Date    WBC 22 68 (H) 10/07/2022       Lab Results   Component Value Date    CREATININE 1 34 (H) 10/07/2022       Lab Results   Component Value Date    INR 1 01 10/07/2022    INR 0 90 10/07/2022     Lab Results   Component Value Date    PTT 26 10/07/2022       No results found for: LACTATE               10/07/22 1306   Echo complete w/ contrast if indicated (Final result) *        Impression   No impression found  Narrative     Left Ventricle: Left ventricular cavity size is normal  Wall thickness    is normal  There is mild asymmetric hypertrophy of the basal septal wall  The left ventricular ejection fraction is 70% by visual estimation  Systolic function is hyperdynamic  Wall motion is normal      Mitral Valve: The posterior leaflet is flail with about 11 mm gap to    anterior leaflet  A small segment of a torn chorda tendinea is visualized  The valve does not appear significantly thickened or myxomatous  There is    severe regurgitation        Acute severe MR due to flail posterior leaflet  There is no torn papillary    seen  A small segment of chorda tendinea is appreciated suggesting    ruptured chorda as etiology  The valve is not significant myxomatous  No    LV wall motion abnormalities are seen  Physical Exam    Airway    Mallampati score: II  TM Distance: >3 FB       Dental   No notable dental hx     Cardiovascular      Pulmonary      Other Findings        Anesthesia Plan  ASA Score- 4 Emergent    Anesthesia Type- general with ASA Monitors  Additional Monitors: central venous line, pulmonary artery catheter and arterial line  Airway Plan: ETT  Comment: Aliene Lennox, M D , have personally seen and evaluated the patient prior to anesthetic care  I have reviewed the pre-anesthetic record, and other medical records if appropriate to the anesthetic care  If a CRNA is involved in the case, I have reviewed the CRNA assessment, if present, and agree  Risks/benefits and alternatives discussed with patient including possible PONV, sore throat, and possibility of rare anesthetic and surgical emergencies  Patient denies history of dysphagia, diverticula, esophageal dysmotility, strictures, stents, or varices  Preinduction ABP; post-induction TLC and PAC; FLIP and ICU post-operatively          Plan Factors-    Chart reviewed  EKG reviewed  Imaging results reviewed  Existing labs reviewed  Patient summary reviewed  Patient instructed to abstain from smoking on day of procedure  Obstructive sleep apnea risk education given perioperatively  Induction- intravenous  Postoperative Plan-     Informed Consent- Anesthetic plan and risks discussed with patient  I personally reviewed this patient with the CRNA  Discussed and agreed on the Anesthesia Plan with the [facility]

## 2022-10-08 ENCOUNTER — APPOINTMENT (OUTPATIENT)
Dept: RADIOLOGY | Facility: HOSPITAL | Age: 62
DRG: 216 | End: 2022-10-08
Payer: COMMERCIAL

## 2022-10-08 LAB
ABO GROUP BLD BPU: NORMAL
ANION GAP SERPL CALCULATED.3IONS-SCNC: 6 MMOL/L (ref 4–13)
ATRIAL RATE: 115 BPM
ATRIAL RATE: 141 BPM
ATRIAL RATE: 87 BPM
BACTERIA UR QL AUTO: ABNORMAL /HPF
BASE EX.OXY STD BLDV CALC-SCNC: 71.9 % (ref 60–80)
BASE EXCESS BLDA CALC-SCNC: 1 MMOL/L
BASE EXCESS BLDV CALC-SCNC: 1 MMOL/L
BILIRUB UR QL STRIP: NEGATIVE
BPU ID: NORMAL
BUN SERPL-MCNC: 28 MG/DL (ref 5–25)
CALCIUM SERPL-MCNC: 8.2 MG/DL (ref 8.3–10.1)
CHLORIDE SERPL-SCNC: 112 MMOL/L (ref 96–108)
CLARITY UR: CLEAR
CO2 SERPL-SCNC: 24 MMOL/L (ref 21–32)
COLOR UR: ABNORMAL
CREAT SERPL-MCNC: 1.52 MG/DL (ref 0.6–1.3)
CROSSMATCH: NORMAL
ERYTHROCYTE [DISTWIDTH] IN BLOOD BY AUTOMATED COUNT: 12.9 % (ref 11.6–15.1)
GFR SERPL CREATININE-BSD FRML MDRD: 48 ML/MIN/1.73SQ M
GLUCOSE SERPL-MCNC: 109 MG/DL (ref 65–140)
GLUCOSE SERPL-MCNC: 111 MG/DL (ref 65–140)
GLUCOSE SERPL-MCNC: 126 MG/DL (ref 65–140)
GLUCOSE SERPL-MCNC: 129 MG/DL (ref 65–140)
GLUCOSE SERPL-MCNC: 132 MG/DL (ref 65–140)
GLUCOSE SERPL-MCNC: 133 MG/DL (ref 65–140)
GLUCOSE SERPL-MCNC: 133 MG/DL (ref 65–140)
GLUCOSE SERPL-MCNC: 138 MG/DL (ref 65–140)
GLUCOSE SERPL-MCNC: 141 MG/DL (ref 65–140)
GLUCOSE SERPL-MCNC: 142 MG/DL (ref 65–140)
GLUCOSE SERPL-MCNC: 142 MG/DL (ref 65–140)
GLUCOSE SERPL-MCNC: 150 MG/DL (ref 65–140)
GLUCOSE SERPL-MCNC: 155 MG/DL (ref 65–140)
GLUCOSE SERPL-MCNC: 180 MG/DL (ref 65–140)
GLUCOSE SERPL-MCNC: 187 MG/DL (ref 65–140)
GLUCOSE UR STRIP-MCNC: NEGATIVE MG/DL
HCO3 BLDA-SCNC: 24.4 MMOL/L (ref 22–28)
HCO3 BLDV-SCNC: 25.6 MMOL/L (ref 24–30)
HCT VFR BLD AUTO: 39.3 % (ref 36.5–49.3)
HGB BLD-MCNC: 13.7 G/DL (ref 12–17)
HGB UR QL STRIP.AUTO: ABNORMAL
HOROWITZ INDEX BLDA+IHG-RTO: 50 MM[HG]
HOROWITZ INDEX BLDA+IHG-RTO: 60 MM[HG]
HYALINE CASTS #/AREA URNS LPF: ABNORMAL /LPF
KETONES UR STRIP-MCNC: NEGATIVE MG/DL
L PNEUMO1 AG UR QL IA.RAPID: NEGATIVE
LEUKOCYTE ESTERASE UR QL STRIP: NEGATIVE
MAGNESIUM SERPL-MCNC: 3 MG/DL (ref 1.6–2.6)
MCH RBC QN AUTO: 30.3 PG (ref 26.8–34.3)
MCHC RBC AUTO-ENTMCNC: 34.9 G/DL (ref 31.4–37.4)
MCV RBC AUTO: 87 FL (ref 82–98)
MRSA NOSE QL CULT: NORMAL
MUCOUS THREADS UR QL AUTO: ABNORMAL
NITRITE UR QL STRIP: NEGATIVE
NON-SQ EPI CELLS URNS QL MICRO: ABNORMAL /HPF
O2 CT BLDA-SCNC: 20.2 ML/DL (ref 16–23)
O2 CT BLDV-SCNC: 13.9 ML/DL
OXYHGB MFR BLDA: 98.1 % (ref 94–97)
P AXIS: 61 DEGREES
P AXIS: 69 DEGREES
P AXIS: 74 DEGREES
PCO2 BLDA: 35.2 MM HG (ref 36–44)
PCO2 BLDV: 40.8 MM HG (ref 42–50)
PEEP RESPIRATORY: 8 CM[H2O]
PEEP RESPIRATORY: 8 CM[H2O]
PH BLDA: 7.46 [PH] (ref 7.35–7.45)
PH BLDV: 7.42 [PH] (ref 7.3–7.4)
PH UR STRIP.AUTO: 5 [PH]
PLATELET # BLD AUTO: 199 THOUSANDS/UL (ref 149–390)
PMV BLD AUTO: 11.8 FL (ref 8.9–12.7)
PO2 BLDA: 145.9 MM HG (ref 75–129)
PO2 BLDV: 35.9 MM HG (ref 35–45)
POTASSIUM SERPL-SCNC: 4.2 MMOL/L (ref 3.5–5.3)
POTASSIUM SERPL-SCNC: 4.3 MMOL/L (ref 3.5–5.3)
PR INTERVAL: 122 MS
PR INTERVAL: 144 MS
PR INTERVAL: 150 MS
PROCALCITONIN SERPL-MCNC: 2.88 NG/ML
PROT UR STRIP-MCNC: ABNORMAL MG/DL
QRS AXIS: 59 DEGREES
QRS AXIS: 64 DEGREES
QRS AXIS: 72 DEGREES
QRSD INTERVAL: 70 MS
QRSD INTERVAL: 78 MS
QRSD INTERVAL: 80 MS
QT INTERVAL: 346 MS
QT INTERVAL: 346 MS
QT INTERVAL: 362 MS
QTC INTERVAL: 416 MS
QTC INTERVAL: 478 MS
QTC INTERVAL: 554 MS
RBC # BLD AUTO: 4.52 MILLION/UL (ref 3.88–5.62)
RBC #/AREA URNS AUTO: ABNORMAL /HPF
S PNEUM AG UR QL: NEGATIVE
SODIUM SERPL-SCNC: 142 MMOL/L (ref 135–147)
SP GR UR STRIP.AUTO: 1.03 (ref 1–1.03)
SPECIMEN SOURCE: ABNORMAL
T WAVE AXIS: 65 DEGREES
T WAVE AXIS: 67 DEGREES
T WAVE AXIS: 70 DEGREES
UNIT DISPENSE STATUS: NORMAL
UNIT PRODUCT CODE: NORMAL
UNIT PRODUCT VOLUME: 350 ML
UNIT RH: NORMAL
UROBILINOGEN UR STRIP-ACNC: <2 MG/DL
VENT AC: 18
VENT AC: 18
VENT- AC: AC
VENT- AC: AC
VENTRICULAR RATE: 115 BPM
VENTRICULAR RATE: 141 BPM
VENTRICULAR RATE: 87 BPM
VT SETTING VENT: 500 ML
VT SETTING VENT: 500 ML
WBC # BLD AUTO: 19.15 THOUSAND/UL (ref 4.31–10.16)
WBC #/AREA URNS AUTO: ABNORMAL /HPF

## 2022-10-08 PROCEDURE — 99024 POSTOP FOLLOW-UP VISIT: CPT | Performed by: THORACIC SURGERY (CARDIOTHORACIC VASCULAR SURGERY)

## 2022-10-08 PROCEDURE — 82805 BLOOD GASES W/O2 SATURATION: CPT | Performed by: PHYSICIAN ASSISTANT

## 2022-10-08 PROCEDURE — 94003 VENT MGMT INPAT SUBQ DAY: CPT

## 2022-10-08 PROCEDURE — 84145 PROCALCITONIN (PCT): CPT | Performed by: PHYSICIAN ASSISTANT

## 2022-10-08 PROCEDURE — 85027 COMPLETE CBC AUTOMATED: CPT | Performed by: PHYSICIAN ASSISTANT

## 2022-10-08 PROCEDURE — 87040 BLOOD CULTURE FOR BACTERIA: CPT | Performed by: PHYSICIAN ASSISTANT

## 2022-10-08 PROCEDURE — 80048 BASIC METABOLIC PNL TOTAL CA: CPT | Performed by: PHYSICIAN ASSISTANT

## 2022-10-08 PROCEDURE — 87070 CULTURE OTHR SPECIMN AEROBIC: CPT | Performed by: PHYSICIAN ASSISTANT

## 2022-10-08 PROCEDURE — 82948 REAGENT STRIP/BLOOD GLUCOSE: CPT

## 2022-10-08 PROCEDURE — 71045 X-RAY EXAM CHEST 1 VIEW: CPT

## 2022-10-08 PROCEDURE — 93005 ELECTROCARDIOGRAM TRACING: CPT

## 2022-10-08 PROCEDURE — 94760 N-INVAS EAR/PLS OXIMETRY 1: CPT

## 2022-10-08 PROCEDURE — 83735 ASSAY OF MAGNESIUM: CPT | Performed by: PHYSICIAN ASSISTANT

## 2022-10-08 PROCEDURE — 87205 SMEAR GRAM STAIN: CPT | Performed by: PHYSICIAN ASSISTANT

## 2022-10-08 PROCEDURE — 99291 CRITICAL CARE FIRST HOUR: CPT | Performed by: STUDENT IN AN ORGANIZED HEALTH CARE EDUCATION/TRAINING PROGRAM

## 2022-10-08 PROCEDURE — 93010 ELECTROCARDIOGRAM REPORT: CPT | Performed by: INTERNAL MEDICINE

## 2022-10-08 RX ORDER — FUROSEMIDE 10 MG/ML
40 INJECTION INTRAMUSCULAR; INTRAVENOUS ONCE
Status: COMPLETED | OUTPATIENT
Start: 2022-10-08 | End: 2022-10-08

## 2022-10-08 RX ORDER — ALBUMIN, HUMAN INJ 5% 5 %
12.5 SOLUTION INTRAVENOUS ONCE
Status: COMPLETED | OUTPATIENT
Start: 2022-10-08 | End: 2022-10-08

## 2022-10-08 RX ORDER — ALBUMIN, HUMAN INJ 5% 5 %
SOLUTION INTRAVENOUS
Status: COMPLETED
Start: 2022-10-08 | End: 2022-10-08

## 2022-10-08 RX ADMIN — DEXMEDETOMIDINE HYDROCHLORIDE 1.2 MCG/KG/HR: 400 INJECTION INTRAVENOUS at 08:45

## 2022-10-08 RX ADMIN — MUPIROCIN 1 APPLICATION: 20 OINTMENT TOPICAL at 08:07

## 2022-10-08 RX ADMIN — POLYETHYLENE GLYCOL 3350 17 G: 17 POWDER, FOR SOLUTION ORAL at 08:07

## 2022-10-08 RX ADMIN — DOCUSATE SODIUM 100 MG: 100 CAPSULE, LIQUID FILLED ORAL at 17:04

## 2022-10-08 RX ADMIN — NOREPINEPHRINE BITARTRATE 11 MCG/MIN: 1 INJECTION, SOLUTION, CONCENTRATE INTRAVENOUS at 04:24

## 2022-10-08 RX ADMIN — ALBUMIN (HUMAN) 12.5 G: 12.5 SOLUTION INTRAVENOUS at 11:54

## 2022-10-08 RX ADMIN — OXYCODONE HYDROCHLORIDE 5 MG: 5 TABLET ORAL at 21:52

## 2022-10-08 RX ADMIN — ALBUMIN (HUMAN) 12.5 G: 12.5 SOLUTION INTRAVENOUS at 05:23

## 2022-10-08 RX ADMIN — PANTOPRAZOLE SODIUM 40 MG: 40 TABLET, DELAYED RELEASE ORAL at 08:07

## 2022-10-08 RX ADMIN — FENTANYL CITRATE 50 MCG: 50 INJECTION INTRAMUSCULAR; INTRAVENOUS at 04:05

## 2022-10-08 RX ADMIN — AMIODARONE HYDROCHLORIDE 200 MG: 200 TABLET ORAL at 21:43

## 2022-10-08 RX ADMIN — OXYCODONE HYDROCHLORIDE 5 MG: 5 TABLET ORAL at 08:07

## 2022-10-08 RX ADMIN — FUROSEMIDE 40 MG: 10 INJECTION, SOLUTION INTRAMUSCULAR; INTRAVENOUS at 14:03

## 2022-10-08 RX ADMIN — OXYCODONE HYDROCHLORIDE 5 MG: 5 TABLET ORAL at 14:42

## 2022-10-08 RX ADMIN — CEFEPIME 2000 MG: 2 INJECTION, POWDER, FOR SOLUTION INTRAVENOUS at 14:13

## 2022-10-08 RX ADMIN — CEFEPIME 2000 MG: 2 INJECTION, POWDER, FOR SOLUTION INTRAVENOUS at 03:12

## 2022-10-08 RX ADMIN — HEPARIN SODIUM 5000 UNITS: 5000 INJECTION INTRAVENOUS; SUBCUTANEOUS at 05:23

## 2022-10-08 RX ADMIN — CHLORHEXIDINE GLUCONATE 15 ML: 1.2 SOLUTION ORAL at 17:04

## 2022-10-08 RX ADMIN — ATORVASTATIN CALCIUM 40 MG: 40 TABLET, FILM COATED ORAL at 17:04

## 2022-10-08 RX ADMIN — ACETAMINOPHEN 975 MG: 325 TABLET, FILM COATED ORAL at 14:03

## 2022-10-08 RX ADMIN — DEXMEDETOMIDINE HYDROCHLORIDE 1.2 MCG/KG/HR: 400 INJECTION INTRAVENOUS at 00:25

## 2022-10-08 RX ADMIN — HYDROMORPHONE HYDROCHLORIDE 0.5 MG: 1 INJECTION, SOLUTION INTRAMUSCULAR; INTRAVENOUS; SUBCUTANEOUS at 16:41

## 2022-10-08 RX ADMIN — FENTANYL CITRATE 50 MCG: 50 INJECTION INTRAMUSCULAR; INTRAVENOUS at 01:04

## 2022-10-08 RX ADMIN — ALBUMIN (HUMAN) 12.5 G: 12.5 SOLUTION INTRAVENOUS at 01:39

## 2022-10-08 RX ADMIN — ACETAMINOPHEN 975 MG: 325 TABLET, FILM COATED ORAL at 21:43

## 2022-10-08 RX ADMIN — VASOPRESSIN 0.04 UNITS/MIN: 20 INJECTION INTRAVENOUS at 17:04

## 2022-10-08 RX ADMIN — CHLORHEXIDINE GLUCONATE 15 ML: 1.2 SOLUTION ORAL at 08:07

## 2022-10-08 RX ADMIN — AMIODARONE HYDROCHLORIDE 200 MG: 200 TABLET ORAL at 05:23

## 2022-10-08 RX ADMIN — HEPARIN SODIUM 5000 UNITS: 5000 INJECTION INTRAVENOUS; SUBCUTANEOUS at 14:03

## 2022-10-08 RX ADMIN — ASPIRIN 325 MG ORAL TABLET 325 MG: 325 PILL ORAL at 08:07

## 2022-10-08 RX ADMIN — ALBUMIN, HUMAN INJ 5% 12.5 G: 5 SOLUTION at 01:39

## 2022-10-08 RX ADMIN — AMIODARONE HYDROCHLORIDE 200 MG: 200 TABLET ORAL at 14:03

## 2022-10-08 RX ADMIN — MUPIROCIN 1 APPLICATION: 20 OINTMENT TOPICAL at 20:35

## 2022-10-08 RX ADMIN — ACETAMINOPHEN 975 MG: 325 TABLET, FILM COATED ORAL at 06:00

## 2022-10-08 RX ADMIN — ALBUMIN, HUMAN INJ 5% 12.5 G: 5 SOLUTION at 05:23

## 2022-10-08 RX ADMIN — DEXMEDETOMIDINE HYDROCHLORIDE 1.2 MCG/KG/HR: 400 INJECTION INTRAVENOUS at 04:52

## 2022-10-08 RX ADMIN — HEPARIN SODIUM 5000 UNITS: 5000 INJECTION INTRAVENOUS; SUBCUTANEOUS at 21:43

## 2022-10-08 RX ADMIN — FENTANYL CITRATE 50 MCG: 50 INJECTION INTRAMUSCULAR; INTRAVENOUS at 02:06

## 2022-10-08 RX ADMIN — NOREPINEPHRINE BITARTRATE 7 MCG/MIN: 1 INJECTION, SOLUTION, CONCENTRATE INTRAVENOUS at 12:47

## 2022-10-08 RX ADMIN — FENTANYL CITRATE 50 MCG: 50 INJECTION INTRAMUSCULAR; INTRAVENOUS at 05:30

## 2022-10-08 RX ADMIN — VASOPRESSIN 0.04 UNITS/MIN: 20 INJECTION INTRAVENOUS at 10:05

## 2022-10-08 NOTE — CONSULTS
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 with 1 day of shortness of breath and cough  Patient states that the day prior he started with shortness of breath was seen in urgent care where he was diagnosed with pneumonia and started on antibiotics  The patient had continued cough with mild hemoptysis overnight and ultimately presented to the emergency department  He was initially thought to have pneumonia and was given IV fluid resuscitation as well as started on antibiotics  His respiratory status progressively declined requiring high-flow nasal cannula and ultimately BiPAP  Stat bedside echo was reviewed revealing severe mitral regurgitation  Patient was transferred to [facility] for cardiac catheterization and emergent mitral valve surgery  The patient presents to the ICU postoperatively s/p mitral valve repair with 34mm annuloplasty ring and repair of ASD  History obtained from chart review due to patient being intubated and sedated  ---------------------------------------------------------------------------------------------------------------------------------------------------------------------  Impressions:  1  Severe MR s/p mitral valve repair  2  Cardiogenic shock  3  Pulmonary edema  4  ASD s/p repair    Plan:    Neuro: Discontinue continuous sedation  Fentanyl prn for pain  Trend neuro exam   Delirium precautions  CV: MAP goal >65  SBP goal <130  CI>2 2  Post-op medications: Epinephrine, 3 mcg/min, Levophed, 10 mcg/min  Wean as able  Volume resuscitation as needed  Monitor rhythm on telemetry  Epicardial pacing wires in palce  Intra-op FLIP LVEF 60%  Lung: Check STAT post-op ABG and CXR  Wean vent with spontaneous breathing trial with goal to extubate  GI: GI prophylaxis with PPI  Bowel regimen  Zofran PRN for nausea  FEN: NPO  Replenish K >4 0, mag >2 0 and calcium >7 0  : Check STAT post-op BMP  Demarco in place  Monitor UOP with goal >0 5cc/kg/hour  Lasix versus volume resuscitate as needed depending on hemodynamics and volume status  ID: Prophylactic post-op abx  Maintain normothermia  Trend temps  Heme: Check STAT post-op H/H and platelets  Monitor incision site, invasive lines, and chest tube outputs for bleeding  Send coag panel if needed  Endo: Insulin gtt for blood sugar control  Disposition: ICU Care   ---------------------------------------------------------------------------------------------------------------------------------------------------------------------  Historical Information   Past Medical History:   Diagnosis Date   • ASD (atrial septal defect)     s/p repair   • Hyperlipidemia    • Severe mitral regurgitation     s/p repair     Past Surgical History:   Procedure Laterality Date   • INGUINAL HERNIA REPAIR Left     unsure mesh placement     Social History   Social History     Substance and Sexual Activity   Alcohol Use Not Currently     Social History     Substance and Sexual Activity   Drug Use Not Currently     Social History     Tobacco Use   Smoking Status Never Smoker   Smokeless Tobacco Never Used     No family history on file  I have reviewed this patient's family history and commented on sigificant items within the HPI    ROS: ROS unable to be obtained due to patient being intubated and sedated  Allergies: No Known Allergies    Home Medications:   Prior to Admission medications    Medication Sig Start Date End Date Taking?  Authorizing Provider   Ascorbic Acid (vitamin C) 1000 MG tablet every 24 hours    Historical Provider, MD   Cyanocobalamin (Vitamin B-12) 500 MCG LOZG every 24 hours    Historical Provider, MD   Lysine 1000 MG TABS     Historical Provider, MD   Multiple Vitamin (MULTI-VITAMIN DAILY PO)     Historical Provider, MD   Omega-3 Fatty Acids (Fish Oil) 500 MG CAPS Every 12 hours    Historical Provider, MD     Inpatient Medications:  Scheduled Meds:  Current Facility-Administered Medications   Medication Dose Route Frequency Provider Last Rate   • acetaminophen  650 mg Rectal Q4H PRN Letitia Cheek PA-C     • acetaminophen  975 mg Oral Q8H Letitia Cheek PA-C     • amiodarone  200 mg Oral Q8H [facility] Letitia Cheek PA-C     • [START ON 10/8/2022] aspirin  325 mg Oral Daily Letitia Cheek PA-C     • [START ON 10/8/2022] atorvastatin  40 mg Oral Daily With GAYLA Solares     • bisacodyl  10 mg Rectal Daily PRN Letitia Cheek PA-C     • calcium chloride  1 g Intravenous Once Letitia Cheek PA-C     • cefazolin  2,000 mg Intravenous Q8H Letitia Cheek PA-C     • chlorhexidine  15 mL Swish & Spit BID Letitia Cheek PA-C     • dexmedetomidine  0 1-1 2 mcg/kg/hr Intravenous Titrated Bj Reyes PA-C 0 7 mcg/kg/hr (10/07/22 2120)   • docusate sodium  100 mg Oral BID Letitia Cheek PA-C     • epinephrine  1-10 mcg/min Intravenous Titrated Letitia Cheek PA-C     • fentanyl citrate (PF)  50 mcg Intravenous Q1H PRN Letitia Cheek PA-C     • heparin (porcine)  5,000 Units Subcutaneous Q8H [facility] Letitia Cheek PA-C     • HYDROmorphone  0 5 mg Intravenous Q2H PRN Letitia Cheek PA-C     • insulin regular (HumuLIN R,NovoLIN R) infusion  0 3-21 Units/hr Intravenous Titrated Letitia Cheek PA-C     • insulin regular (HumuLIN R,NovoLIN R) infusion  0 3-21 Units/hr Intravenous Titrated Letitia Cheek PA-C     • lidocaine (cardiac)  100 mg Intravenous Q30 Min PRN Letitia Cheek PA-C     • magnesium sulfate  2 g Intravenous Once Letitia Cheek PA-C     • midazolam  4 mg Intravenous Once Bj Reyes PA-C     • mupirocin  1 application Nasal N38A [facility] & longterm Letitia Cheek PA-C     • niCARdipine  2 5-15 mg/hr Intravenous Titrated Letitia Cheek PA-C     • norepinephrine  1-30 mcg/min Intravenous Titrated Letitia Cheek PA-C 10 8 mcg/min (10/07/22 2100)   • ondansetron  4 mg Intravenous Q6H PRN Letitia Cheek PA-C     • oxyCODONE  2 5 mg Oral Q4H PRN Letitia Cheek PA-C     • oxyCODONE  5 mg Oral Q4H PRN Letitia Cheek PA-C     • [START ON 10/8/2022] pantoprazole  40 mg Oral Daily Letitia Cheek PA-C     • [START ON 10/8/2022] polyethylene glycol  17 g Oral Daily Letitia Cheek PA-C     • potassium chloride  20 mEq Intravenous Once PRN Letitia Cheek PA-C     • potassium chloride  20 mEq Intravenous Q1H PRN Letitia Cheek PA-C     • potassium chloride  20 mEq Intravenous Q30 Min PRN Letitia Cheek PA-C     • sodium chloride  20 mL/hr Intravenous Continuous Letitia Cheek PA-C 20 mL/hr (10/07/22 2130)     Continuous Infusions:dexmedetomidine, 0 1-1 2 mcg/kg/hr, Last Rate: 0 7 mcg/kg/hr (10/07/22 2120)  epinephrine, 1-10 mcg/min  insulin regular (HumuLIN R,NovoLIN R) infusion, 0 3-21 Units/hr  insulin regular (HumuLIN R,NovoLIN R) infusion, 0 3-21 Units/hr  niCARdipine, 2 5-15 mg/hr  norepinephrine, 1-30 mcg/min, Last Rate: 10 8 mcg/min (10/07/22 2100)  sodium chloride, 20 mL/hr, Last Rate: 20 mL/hr (10/07/22 2130)      PRN Meds:  acetaminophen, 650 mg, Q4H PRN  bisacodyl, 10 mg, Daily PRN  fentanyl citrate (PF), 50 mcg, Q1H PRN  HYDROmorphone, 0 5 mg, Q2H PRN  lidocaine (cardiac), 100 mg, Q30 Min PRN  ondansetron, 4 mg, Q6H PRN  oxyCODONE, 2 5 mg, Q4H PRN  oxyCODONE, 5 mg, Q4H PRN  potassium chloride, 20 mEq, Once PRN  potassium chloride, 20 mEq, Q1H PRN  potassium chloride, 20 mEq, Q30 Min PRN      ---------------------------------------------------------------------------------------------------------------------------------------------------------------------  Vitals:   Vitals:    10/07/22 1600 10/07/22 2100   SpO2: 98% 97%     Arterial Line:          Temperature: Temp (24hrs), Av 6 °F (38 1 °C), Min:97 3 °F (36 3 °C), Max:101 48 °F (38 6 °C)  Current:      Weights: There is no height or weight on file to calculate BMI  Hemodynamic Monitoring:  PAP:  , CVP:  , CO:  , CI:  , SVR:      Ventilator Settings:  Respiratory  Report   Lab Data (Last 4 hours)    None         O2/Vent Data (Last 4 hours)      10/07 2100          Patient safety screen outcome: Failed                 Labs:   Results from last 7 days   Lab Units 10/07/22  2104 10/07/22  2102 10/07/22  2000 10/07/22  1935 10/07/22  1929 10/07/22  1719 10/07/22  1424 10/07/22  1423 10/07/22  0742   WBC Thousand/uL  --   --   --   --   --   --   --   --   68*   HEMOGLOBIN g/dL  --  15 2  --   --   --   --   --   --  17 5*   I STAT HEMOGLOBIN g/dl 13 9  --  11 2*   < >  --    < >  --    < >  --    HEMATOCRIT %  --  44 6  --   --   --   --   --   --  49 7*   HEMATOCRIT, ISTAT % 41  --  33*   < >  --    < >  --    < >  --    PLATELETS Thousands/uL  --  224  --   --  230  --  302  --  322   NEUTROS PCT %  --   --   --   --   --   --   --   --  86*   MONOS PCT %  --   --   --   --   --   --   --   --  6    < > = values in this interval not displayed       Results from last 7 days   Lab Units 10/07/22  2104 10/07/22  2102 10/07/22  2000 10/07/22  1935 10/07/22  1423 10/07/22  0742   SODIUM mmol/L  --  143  --   --   --  135   POTASSIUM mmol/L  --  4 2  --   --   --  4 8   CHLORIDE mmol/L  --  111*  --   --   --  100   CO2 mmol/L  --  20*  --   --   --  21   CO2, I-STAT mmol/L 23  --  24 26   < >  --    BUN mg/dL  --  26*  --   --   --  25   CREATININE mg/dL  --  1 64*  --   --   --  1 34*   CALCIUM mg/dL  --  8 4  --   --   --  9 4   ALK PHOS U/L  --   --   --   --   --  94   ALT U/L  --   --   --   --   --  21   AST U/L  --   --   --   --   --  20   GLUCOSE, ISTAT mg/dl 164*  --  180* 174*   < >  --     < > = values in this interval not displayed  Post-op AB 28/46 6/131 0/21 9/-5/99%  Post-op CXR: Bilateral pulmonary vascular congestion  I have personally reviewed pertinent films in PACS  Post-op EKG: NSR rate 100 This was personally reviewed by myself  Physical Exam  Vitals and nursing note reviewed  Constitutional:       Appearance: He is normal weight  Interventions: He is sedated, intubated and restrained  HENT:      Head: Normocephalic and atraumatic  Eyes:      Pupils: Pupils are equal, round, and reactive to light  Cardiovascular:      Rate and Rhythm: Normal rate and regular rhythm  Pulses:           Radial pulses are 2+ on the right side and 2+ on the left side  Dorsalis pedis pulses are 2+ on the right side and 2+ on the left side  Heart sounds: Normal heart sounds  Heart sounds not distant  No murmur heard  No friction rub  No gallop  Pulmonary:      Effort: No tachypnea  He is intubated  Breath sounds: Decreased breath sounds and rales present  Abdominal:      General: There is no distension  Palpations: Abdomen is soft  Musculoskeletal:      Right lower leg: No edema  Left lower leg: No edema  Comments: R femoral IABP in place, site C/D/I  Knee immobilizer in place   Skin:     General: Skin is warm and dry  Neurological:      Comments: Sedated       Invasive lines and devices:   Invasive Devices  Report    Central Venous Catheter Line  Duration           CVC Central Lines 10/07/22 Triple <1 day    Introducer 10/07/22 <1 day          Peripheral Intravenous Line  Duration           Peripheral IV 10/07/22 Left Antecubital <1 day    Peripheral IV 10/07/22 Left Hand <1 day    Peripheral IV 10/07/22 Right Forearm <1 day          Arterial Line  Duration           Arterial Line 10/07/22 Radial <1 day          Line  Duration           Arterial Sheath 8 Fr  Right Femoral <1 day    Pacer Wires <1 day    Pacer Wires <1 day          Drain  Duration           Chest Tube 1 Posterior;Mediastinal 32 Fr  <1 day    Chest Tube 2 Anterior;Mediastinal 32 Fr  <1 day    Urethral Catheter Temperature probe 16 Fr  <1 day          Airway  Duration           ETT  Hi-Lo; Cuffed;Oral 8 5 mm <1 day              ---------------------------------------------------------------------------------------------------------------------------------------------------------------------  Care Time Delivered:   No Critical Care time spent     SIGNATURE: Paula Valdivia PA-C  DATE: October 7, 2022  TIME: 9:42 PM

## 2022-10-08 NOTE — OP NOTE
OPERATIVE REPORT  PATIENT NAME: Yary Vang    :  1960  MRN: [id number]  Pt Location: BE OR ROOM 16    SURGERY DATE: 10/7/2022    SURGEON: Milvia Mcdaniel     ASSISTANT: Edison Tidwell PA-C    ADDITIONAL ASSISTANT: Gissel Arteaga MD (resident)     PREOPERATIVE DIAGNOSIS: Acute severe mitral regurgitation, cardiogenic shock  POSTOPERATIVE DIAGNOSES: Acute severe mitral regurgitation, cardiogenic shock, atrial septal defect  NYHA CLASS: 4    CCS CLASS: 4    PROCEDURES:1  Complex mitral valve repair with complex P2 resection, chordal transfer, rotational plasty, and 34 mm Ortiz Physio II ring annuloplasty  ANESTHESIA General endotracheal anesthesia with transesophageal echocardiogram guidance, Dr Garcia Chimera: 72 minutes  CROSSCLAMP TIME: 59 minutes  Chest tubes: x 2     Pacing wires: A x1 and V x1  TRANSFUSIONS: None     SPECIMENS: Portion of P2 (mitral valve)     ESTIMATED BLOOD LOSS: 200 mL    OPERATIVE TECHNIQUE: The patient was taken to the operating room and placed supine on the operating table  Following the satisfactory induction of general anesthesia and the placement of monitoring lines the patient was prepped and draped in the usual sterile fashion  A time-out procedure was performed  The patient underwent median sternotomy, pericardiotomy, and systemic heparinization  Epiaortic ultrasound was used to evaluate the ascending aorta which was found to be free of significant atheromatous disease  The patient underwent aortic and bicaval cannulation and an antegrade was placed  The patient was initiated on bypass  The intra-aortic balloon pump was paused and remained on standby throughout the CPB period  An LV vent was placed through the right superior pulmonary vein  The ascending aorta was crossclamped  Antegrade [place] cardioplegia was delivered with an excellent arrest      Left atriotomy was performed    Upon entry into the left atrium, a medium sized ostium secundum-type atrial septal defect was identified  This was closed with a running 4-0 Prolene suture  The mitral valve was exposed with a self-retaining retractor  Thorough mitral valve analysis was performed  There was apparently chronic degenerative mitral valve disease with myxomatous degeneration of the P2 segment of the posterior leaflet  The P2 segment was massively enlarged  The annulus was dilated as well  The anterolateral (left-sided) cords of the P2 segment were markedly elongated  The posteromedial (right-sided) cords were all torn, both primary and secondary chordae  There was some hematoma at the margin of the P2 and P3 segments, indicating an acute tear, which provided the explanation for the patient's presentation in cardiogenic shock  I felt that an attempt at valve repair was warranted in this otherwise young and healthy individual   Annuloplasty sutures were placed to aid in exposure  A very large complex-shaped resection of the P2 segment was performed, removing nearly 75% of the leaflet segment  The remaining 25% of the leaflet segment was the portion adjacent to P1  A junction of multiple elongated left sided cords was detached from the anterolateral papillary muscle and was trimmed to an acceptable length  This cord was then rotated to the posteromedial papillary muscle and attached with a double layered 5-0 Prolene suture  The cut edge of the remaining P2 segment was rotated along with the cord and was sutured with running 4-0 Prolene to the torn edge of the P3 segment  Thus a cord transfer and rotational plasty of the leaflet were performed  The valve was tested and found to be competent with a very posterior line of coaptation  A 34 mm Physio II ring was selected and brought to the field  The sutures were passed through the ring  The ring was seated and the sutures were tied down  The valve was tested and found to be competent   While de-airing the heart the left atriotomy was closed with running 3-0 Prolene suture  The heart was extensively de-aired and the crossclamp was removed  Atrial and ventricular pacing wires were placed  Following a period of reperfusion the patient was weaned from cardiopulmonary bypass and decannulated  Protamine was administered with normalization of the ACT  Hemostasis was confirmed in all fields  Thoracostomy tubes were placed  The sternum was closed with stainless steel wires  The fascia, subdermis and skin were closed with multiple layers of running absorbable suture  As the attending surgeon, I was present and scrubbed for all critical portions of this procedure  Sponge, needle and instrument counts were reported as correct by the nursing staff  There is no cardiac residency program at this facility and for complex procedures such as this, it is vital to have a physician assistant experienced in cardiac surgery  The assistance of Letitia Cheek PA-C was necessary during mitral valve repair for retraction of the heart with correct tissue handling techniques during exposure of the mitral valve, for proper following of the suture during valve reconstruction, annular suture placement, and left atriotomy closure  Final FLIP demonstrated normal biventricular function and no evidence of flow across the interatrial septum    There was zero mitral regurgitation and no evidence of mitral stenosis or BILLY        [facility]  DATE: October 7, 2022  TIME: 8:28 PM

## 2022-10-08 NOTE — PROGRESS NOTES
Progress Note - Critical Care   Kem Calderon 60 y o  male MRN: [id number]  Unit/Bed#: [facility] 414-01 Encounter: [id number]      Attending Physician: Giovana Dupont MD    24 Hour Events/HPI: POD # 1 s/p complex MV repair  Patient maintained on epi 3 and levophed 11 for MAP and CI parameters  Persistent fevers post-op  Cultures sent and patient started on cefepime  ROS: Review of Systems   Unable to perform ROS: Intubated     ---------------------------------------------------------------------------------------------------------------------------------------------------------------------  Impressions:  1  Severe MR s/p complex MV repair  2  Cardiogenic shock  3  Pulm edema  4  ASD   5  CON versus CKD  6  Fevers    Plan:    Neuro:   · Sedation plan: precedex  · RASS goal: 0 Alert and Calm  · Pain controlled with: tylenol ATC, oxycodone, dilaudid and fentanyl PRN  · Regulate sleep/wake cycle  · Delirium precautions  · CAM-ICU daily  · Trend neuro exam    CV:   · Cardiac infusions: Epinephrine, 2 mcg/min, Levophed, 10 mcg/min  · Wean to off as able  · MAP goal > 65 and CI >2 2  · Keep PA catheter  · Keep arterial line  · Keep IABP (1:1 augmentation)  · Rhythm: NSR  · Follow rhythm on telemetry  · Hold BB  · Maintain epicardial pacing wires for pacing PRN  ·  mg QD  · Statin: Lipitor 40mg qHS  · Amiodarone 200 mg PO q8 hours     Lung:   · Acute post-op pulmonary insufficiency; Requiring Intubation with ventilator support, secondary to pulmonary vascular congestion, poor inspiratory effort secondary to pain and agitation  Continue incentive spirometry/coughing/deep breathing exercises    Wean supplemental oxygen as tolerated for saturation > 90%  · SBT plan: attempt today  · Wean vent as tolerated  · Chlorhexidine ordered: yes  · Chest tube output remains persistently high; Continue chest tubes to suction today    GI:   · Continue PPI for stress ulcer prophylaxis  · Continue bowel regimen  · Trend abdominal exam and bowel function    FEN:   · Diuretic plan: Lasix PRN  · Nutrition/diet plan: NPO  · Replenish electrolytes with goals: K >4 0, Mag >2 0, and Phos >3 0    :   · Indwelling Demarco present: yes   · Keep Demarco  · Trend UOP and BUN/creat  · Strict I and O    ID:   · Source of infection: Fevers  · ABX ordered: cefepiime  · Day # 1 of ? Course  · Cultures pending  · Trend temps and WBC count  · Maintain normothermia    Results from last 7 days   Lab Units 10/07/22  1424 10/07/22  0742   PROCALCITONIN ng/ml 0 62* 0 28*     Heme:   · Trend hgb and plts    Endo:   · Glycemic control plan: Glucose well-controlled  Continue insulin infusion      Results from last 6 Months   Lab Units 10/07/22  1424   HEMOGLOBIN A1C % 5 2     MSK/Skin:  · Mobility goal: Bedrest  · PT consult: not applicable  · OT consult: not applicable  · Frequent turning and pressure off-loading  · Local wound care as needed    Disposition:  Continue ICU care  ---------------------------------------------------------------------------------------------------------------------------------------------------------------------  Allergies: No Known Allergies    Medications:     VTE Pharmacologic Prophylaxis: Heparin SQ  VTE Mechanical Prophylaxis: sequential compression device    Scheduled Meds:   acetaminophen, 975 mg, Q8H  amiodarone, 200 mg, Q8H STONE  aspirin, 325 mg, Daily  atorvastatin, 40 mg, Daily With Dinner  cefepime, 2,000 mg, Q12H  chlorhexidine, 15 mL, BID  docusate sodium, 100 mg, BID  heparin (porcine), 5,000 Units, Q8H STONE  mupirocin, 1 application, Y09E STONE  pantoprazole, 40 mg, Daily  polyethylene glycol, 17 g, Daily       Continuous Infusions:  dexmedetomidine, 0 1-1 2 mcg/kg/hr, Last Rate: 1 2 mcg/kg/hr (10/08/22 5772)  epinephrine, 1-10 mcg/min, Last Rate: 2 mcg/min (10/08/22 1005)  insulin regular (HumuLIN R,NovoLIN R) infusion, 0 3-21 Units/hr, Last Rate: 3 Units/hr (10/08/22 6068)  niCARdipine, 2 5-15 mg/hr  norepinephrine, 1-30 mcg/min, Last Rate: 10 mcg/min (10/08/22 0530)  sodium chloride, 20 mL/hr, Last Rate: 20 mL/hr (10/07/22 2130)      PRN Meds:  bisacodyl, 10 mg, Daily PRN  fentanyl citrate (PF), 50 mcg, Q1H PRN  HYDROmorphone, 0 5 mg, Q2H PRN  lidocaine (cardiac), 100 mg, Q30 Min PRN  ondansetron, 4 mg, Q6H PRN  oxyCODONE, 2 5 mg, Q4H PRN  oxyCODONE, 5 mg, Q4H PRN  potassium chloride, 20 mEq, Once PRN  potassium chloride, 20 mEq, Q1H PRN  potassium chloride, 20 mEq, Q30 Min PRN      ---------------------------------------------------------------------------------------------------------------------------------------------------------------------  Vitals:   Vitals:    10/08/22 0430 10/08/22 0500 10/08/22 0530 10/08/22 0537   BP:  109/55     BP Location:       Pulse: 86 86 84    Resp: 18 18 18    Temp: (!) 103 1 °F (39 5 °C) (!) 103 1 °F (39 5 °C) (!) 102 92 °F (39 4 °C)    TempSrc:       SpO2: 100% 100% 100%    Weight:    91 2 kg (201 lb 1 oz)     Arterial Line:  Arterial Line BP: 102/49  Arterial Line MAP (mmHg): 75 mmHg    Tele Rhythm: NSR (This was personally reviewed by myself )    Respiratory:  SpO2: SpO2: 100 %  SpO2 Device: O2 Device: Ventilator (cmv18/500/50/8)    18/500/50%/8    Ventilator:  Respiratory  Report   Lab Data (Last 4 hours)      10/08 0406            pH, Arterial       7 458             pCO2, Arterial       35 2             pO2, Arterial       145 9             HCO3, Arterial       24 4             Base Excess, Arterial       1 0                  O2/Vent Data     None              Temperature: Temp (24hrs), Av 8 °F (38 2 °C), Min:97 3 °F (36 3 °C), Max:103 28 °F (39 6 °C)  Current: Temperature: (!) 102 92 °F (39 4 °C)  TMax: 103 1    Weights:   Weight (last 2 days)     Date/Time Weight    10/08/22 0537 91 2 (201 06)        Body mass index is 26 53 kg/m²      Hemodynamic Monitoring:  PAP: PAP: 27/15  CVP: CVP (mean): 9 mmHg  CO: CO (L/min): 5 1 L/min  CI: CI (L/min/m2): 2 4 L/min/m2  SVR: SVR (dyne*sec)/cm5: 1003 (dyne*sec)/cm5    Intake and Outputs:    Intake/Output Summary (Last 24 hours) at 10/8/2022 0620  Last data filed at 10/8/2022 0523  Gross per 24 hour   Intake 4717 99 ml   Output 2845 ml   Net 1872 99 ml     I/O last 24 hours: In: 5 [I V :3113; NG/GT:80; IV Piggyback:850]  Out: 4993 [Urine:1990; Emesis/NG output:75; Blood:680; Chest Tube:100]    UOP: 50/hour   BM: 0 in the last 24 hours    Chest tube Output:  Mediastinal tubes: 50 mL/8 hours  100 mL/24 hours   Pleural tubes: -- mL/8 hours  -- mL/24 hours     Labs:  Results from last 7 days   Lab Units 10/08/22  0406 10/07/22  2104 10/07/22  2102 10/07/22  1935 10/07/22  1929 10/07/22  1423 10/07/22  0742   WBC Thousand/uL 19 15*  --   --   --   --   --  22 68*   HEMOGLOBIN g/dL 13 7  --  15 2  --   --   --  17 5*   I STAT HEMOGLOBIN g/dl  --  13 9  --    < >  --    < >  --    HEMATOCRIT % 39 3  --  44 6  --   --   --  49 7*   HEMATOCRIT, ISTAT %  --  41  --    < >  --    < >  --    PLATELETS Thousands/uL 199  --  224  --  230   < > 322   NEUTROS PCT %  --   --   --   --   --   --  86*   MONOS PCT %  --   --   --   --   --   --  6    < > = values in this interval not displayed       Results from last 7 days   Lab Units 10/08/22  0406 10/07/22  2358 10/07/22  2104 10/07/22  2102 10/07/22  2000 10/07/22  1935 10/07/22  1423 10/07/22  0742   SODIUM mmol/L 142  --   --  143  --   --   --  135   POTASSIUM mmol/L 4 3 4 2  --  4 2  --   --   --  4 8   CHLORIDE mmol/L 112*  --   --  111*  --   --   --  100   CO2 mmol/L 24  --   --  20*  --   --   --  21   CO2, I-STAT mmol/L  --   --  23  --  24 26   < >  --    BUN mg/dL 28*  --   --  26*  --   --   --  25   CREATININE mg/dL 1 52*  --   --  1 64*  --   --   --  1 34*   CALCIUM mg/dL 8 2*  --   --  8 4  --   --   --  9 4   ALK PHOS U/L  --   --   --   --   --   --   --  94   ALT U/L  --   --   --   --   --   --   --  21   AST U/L  --   --   --   --   --   --   --  20   GLUCOSE, ISTAT mg/dl  --   -- 164*  --  180* 174*   < >  --     < > = values in this interval not displayed  Baseline Creat: unknown (admission 1 3)    Results from last 7 days   Lab Units 10/08/22  0406 10/07/22  0742   MAGNESIUM mg/dL 3 0* 1 8     Results from last 7 days   Lab Units 10/07/22  0742   PHOSPHORUS mg/dL 4 1      Results from last 7 days   Lab Units 10/07/22  2102 10/07/22  1424 10/07/22  0742   INR  1 36* 1 01 0 90   PTT seconds 48*  --  26     Results from last 7 days   Lab Units 10/07/22  1045 10/07/22  0742   LACTIC ACID mmol/L 4 1* 3 7*     Results from last 7 days   Lab Units 10/08/22  0406   PH ART  7 458*   PCO2 ART mm Hg 35 2*   PO2 ART mm Hg 145 9*   HCO3 ART mmol/L 24 4   BASE EXC ART mmol/L 1 0   ABG SOURCE  Line, Arterial     SVO2: 71 9%    Micro:   Blood Culture:   Lab Results   Component Value Date    BLOODCX Received in Microbiology Lab  Culture in Progress  10/07/2022    BLOODCX Received in Microbiology Lab  Culture in Progress  10/07/2022     Urine Culture: No results found for: URINECX  Sputum Culture: No components found for: SPUTUMCX  Wound Culure: No results found for: WOUNDCULT    Diagnositic Studies:  10/08/22 CXR: Lines and tubes in appropriate position  +pulm edema  This was personally reviewed by myself in PACS  10/08/22 EKG: NSR  Early repolarization  This was personally reviewed by myself  Nutrition:        Diet Orders   (From admission, onward)             Start     Ordered    10/08/22 0521  Diet NPO  Diet effective 0500        References:    Nutrtion Support Algorithm Enteral vs  Parenteral   Question Answer Comment   Diet Type NPO    RD to adjust diet per protocol? Yes        10/08/22 0520              Physical Exam  Vitals reviewed  Constitutional:       General: He is not in acute distress  Interventions: He is sedated and intubated  Neck:      Comments: RIJ CVC and PA catheter  Cardiovascular:      Rate and Rhythm: Normal rate and regular rhythm        Pulses: Dorsalis pedis pulses are 1+ on the right side and 1+ on the left side  Heart sounds: No murmur heard  No friction rub  Comments: +IABP heard  Pulmonary:      Effort: Pulmonary effort is normal  He is intubated  Comments: Limited breath sounds are clear  Genitourinary:         Comments: +Demarco  Skin:     General: Skin is cool and dry  Neurological:      Comments: Non-focal  Sedated  Psychiatric:      Comments: Cannot assess  Invasive lines and devices: Invasive Devices  Report    Central Venous Catheter Line  Duration           CVC Central Lines 10/07/22 Triple <1 day    Introducer 10/07/22 <1 day          Peripheral Intravenous Line  Duration           Peripheral IV 10/07/22 Left Antecubital <1 day    Peripheral IV 10/07/22 Left Hand <1 day    Peripheral IV 10/07/22 Right Forearm <1 day          Arterial Line  Duration           Arterial Line 10/07/22 Radial <1 day          Line  Duration           Arterial Sheath 8 Fr  Right Femoral <1 day    Pacer Wires <1 day    Pacer Wires <1 day          Drain  Duration           Chest Tube 1 Posterior;Mediastinal 32 Fr  <1 day    Chest Tube 2 Anterior;Mediastinal 32 Fr  <1 day    NG/OG/Enteral Tube Orogastric Right mouth <1 day    Urethral Catheter Temperature probe 16 Fr  <1 day          Airway  Duration           ETT  Hi-Lo; Cuffed;Oral 8 5 mm <1 day              ---------------------------------------------------------------------------------------------------------------------------------------------------------------------  Code Status: Level 1 - Full Code    Care Time Delivered:   No Critical Care time spent     Collaborative bedside rounds performed with cardiac surgery attending, critical care attending and bedside RN      SIGNATURE: Danie Cameron PA-C  DATE: October 8, 2022  TIME: 6:20 AM

## 2022-10-08 NOTE — UTILIZATION REVIEW
Initial Clinical Review    Admission: Date/Time/Statement:   Admission Orders (From admission, onward)     Ordered        10/07/22 1547  Inpatient Admission  Once                      Orders Placed This Encounter   Procedures   • Inpatient Admission     Standing Status:   Standing     Number of Occurrences:   1     Order Specific Question:   Level of Care     Answer:   Critical Care [15]     Order Specific Question:   Estimated length of stay     Answer:   More than 2 Midnights     Order Specific Question:   Certification     Answer:   I certify that inpatient services are medically necessary for this patient for a duration of greater than two midnights  See H&P and MD Progress Notes for additional information about the patient's course of treatment  No chief complaint on file  Initial Presentation: 60 y o  male  Presents to [facility] via EMS as transfer for higher level of care from [facility] 1626 due to acute worsening of respiratory status,, TTE findings of wide open severe MR  needs cardiac surgery evaluation, IABP  Transferred to cardiac cath lab BE on bipap, sats > 98%, alert, VT > 800 cc, Cardiac cath showed no CAD, IABP was placed  From cath lab, patient transferred to OR  Admitted as Inpatient, critical care,  for acute Mitral Valve regurgitation, pulmonary edema and cardiogenic shock  Plan: Evaluated by cardiac surgery recommended emergent mitral valve repair vs  Replacement  Emergently to OR 10/7   PROCEDURES  Complex mitral valve repair with complex P2 resection, chordal transfer, rotational plasty, and 34 mm Ortiz Physio II ring annuloplasty      Anesthesia  General endotracheal anesthesia with transesophageal echocardiogram guidance    CARDIOPULMONARY BYPASS TIME: 72 minutes     CROSSCLAMP TIME: 59 minutes     Chest tubes: x 2    Pacing wires: A x1 and V x1     TRANSFUSIONS: None    SPECIMENS: Portion of P2 (mitral valve)    ESTIMATED BLOOD LOSS: 200 mL     Date: 10/8  Day 2:  POD 1  Acute Mitral Valve Rupture s/p Mvr/annuloplasty    Extubated 10/8 am   Cardiogenic Shock attempted to wean IABP 1:1 but experienced hypotension, fevers overnight, continue IV abx, culture sputum Exam notes skin pink warm dry, CV no JVD, regular rate rhyhm, no edema, Alert oriented x 3, lungs CTA      ED Triage Vitals   Temperature Pulse Respirations Blood Pressure SpO2   10/07/22 2100 10/07/22 2100 10/07/22 2100 10/07/22 2250 10/07/22 1600   98 8 °F (37 1 °C) 100 14 109/59 98 %      Temp Source Heart Rate Source Patient Position - Orthostatic VS BP Location FiO2 (%)   10/07/22 2100 10/07/22 2100 10/07/22 2100 10/07/22 2250 10/07/22 2100   Core Monitor (P) Lying Left arm 80      Pain Score       10/07/22 1545       No Pain          Wt Readings from Last 1 Encounters:   10/08/22 91 2 kg (201 lb 1 oz)     Additional Vital Signs:    Date/Time Temp Pulse Resp BP MAP (mmHg) Arterial Line BP MAP SpO2 FiO2 (%) O2 Device O2 Interface Device CO CI Patient Position - Orthostatic VS CVP (mean)   10/08/22 1100 101 66 °F (38 7 °C) Abnormal  75 20 102/54 75 94/62 78 mmHg 96 % -- Nasal cannula -- 5 4 L/min 2 6 L/min/m2 Lying 18 mmHg   10/08/22 1000 102 02 °F (38 9 °C) Abnormal  77 18 109/67 83 84/54 69 mmHg 96 % -- Nasal cannula -- 4 9 L/min 2 3 L/min/m2 Lying 9 mmHg   10/08/22 0900 102 2 °F (39 °C) Abnormal  79 16 115/72 89 94/60 77 mmHg 99 % -- Ventilator -- 5 9 L/min 2 8 L/min/m2 Lying 11 mmHg   10/08/22 0800 102 38 °F (39 1 °C) Abnormal  80 16 110/69 84 100/52 74 mmHg 98 % -- Ventilator -- 5 1 L/min 2 4 L/min/m2 Lying 10 mmHg   10/08/22 0500 103 1 °F (39 5 °C) Abnormal  86 18 109/55 78 97/50 74 mmHg 100 % -- -- -- 5 1 L/min 2 4 L/min/m2 -- 8 mmHg   10/08/22 0430 103 1 °F (39 5 °C) Abnormal  86 18 -- -- 95/48 71 mmHg 100 % -- -- -- -- -- -- 9 mmHg   10/08/22 0400 103 1 °F (39 5 °C) Abnormal  87 18 110/58 79 98/52 75 mmHg 99 % 50 Ventilator  -- 5 7 L/min 2 7 L/min/m2 Lying 9 mmHg   O2 Device: [id number]/500/50/8 at 10/08/22 0400   10/08/22 0330 103 28 °F (39 6 °C) Abnormal  88 18 -- -- 93/47 70 mmHg 99 % -- -- -- -- -- -- 8 mmHg   10/08/22 0315 103 28 °F (39 6 °C) Abnormal  88 18 -- -- 93/52 73 mmHg 99 % -- -- -- -- -- -- 10 mmHg   10/08/22 0200 102 56 °F (39 2 °C) Abnormal  90 18 103/55 73 95/51 73 mmHg 99 % 50 -- -- 4 4 L/min 2 1 L/min/m2 -- 11 mmHg   10/08/22 0105 101 3 °F (38 5 °C) Abnormal  91 18 -- -- 93/50 72 mmHg 98 % -- -- -- -- -- -- 9 mmHg   10/08/22 0100 100 94 °F (38 3 °C) Abnormal  91 32 Abnormal  127/69 89 103/63 85 mmHg 98 % -- -- -- 4 6 L/min 2 2 L/min/m2 -- 9 mmHg   10/08/22 0030 100 94 °F (38 3 °C) Abnormal  91 23 Abnormal  -- -- 105/57 82 mmHg 99 % -- -- -- 4 1 L/min 2 L/min/m2 -- 10 mmHg   10/08/22 0000 99 86 °F (37 7 °C) 92 23 Abnormal  112/56 80 104/55 80 mmHg 99 % 60 Ventilator  -- 4 9 L/min 2 3 L/min/m2 Lying 10 mmHg   O2 Device: cmv18/500/60/8 at 10/08/22 0000   10/07/22 2345 100 58 °F (38 1 °C) Abnormal  89 16 -- -- 108/59 80 mmHg 98 % -- -- -- -- -- -- 9 mmHg   10/07/22 2300 99 86 °F (37 7 °C) 92 18 113/59 78 103/62 84 mmHg 99 % -- -- -- 4 3 L/min 2 1 L/min/m2 -- 10 mmHg       Pertinent Labs/Diagnostic Test Results:   10/8 EKG   Normal sinus rhythm  Early repolarization  Normal ECG    10/7 EKG    Normal sinus rhythm  Prolonged QT  Abnormal ECG  When compared with ECG of 07-OCT-2022 12:14, (unconfirmed)  ST no longer depressed in Inferior leads  ST no longer depressed in Anterolateral leads    Cardiac cath 10/7    IABP inserted 10/7 1551 8 fr, 50 ml, inserted in R groin with sheath, indication pre-procedure support, IABP settings 1:1  Coronary findings:        XR chest portable   Final Result by Estella Fnin MD (10/08 1140)      Status post MVR  Minimal improvement in severe asymmetric pulmonary edema, greater on the right  Probable small effusions                          XR chest portable ICU   Final Result by Estella Finn MD (10/08 2498)      Status post MVR      Worsening pulmonary edema, severe  XR chest portable    (Results Pending)     Results from last 7 days   Lab Units 10/07/22  1144 10/07/22  0742   SARS-COV-2  Negative Negative     Results from last 7 days   Lab Units 10/08/22  0406 10/07/22  2104 10/07/22  2102 10/07/22  2000 10/07/22  1935 10/07/22  1929 10/07/22  1423 10/07/22  0742   WBC Thousand/uL 19 15*  --   --   --   --   --   --  22 68*   HEMOGLOBIN g/dL 13 7  --  15 2  --   --   --   --  17 5*   I STAT HEMOGLOBIN g/dl  --  13 9  --  11 2* 11 9*  --    < >  --    HEMATOCRIT % 39 3  --  44 6  --   --   --   --  49 7*   HEMATOCRIT, ISTAT %  --  41  --  33* 35*  --    < >  --    PLATELETS Thousands/uL 199  --  224  --   --  230   < > 322   NEUTROS ABS Thousands/µL  --   --   --   --   --   --   --  19 56*    < > = values in this interval not displayed           Results from last 7 days   Lab Units 10/08/22  0406 10/07/22  2358 10/07/22  2104 10/07/22  2102 10/07/22  2000 10/07/22  1935 10/07/22  1906 10/07/22  1900 10/07/22  1423 10/07/22  0742   SODIUM mmol/L 142  --   --  143  --   --   --   --   --  135   POTASSIUM mmol/L 4 3 4 2  --  4 2  --   --   --   --   --  4 8   CHLORIDE mmol/L 112*  --   --  111*  --   --   --   --   --  100   CO2 mmol/L 24  --   --  20*  --   --   --   --   --  21   CO2, I-STAT mmol/L  --   --  23  --  24 26 31 31   < >  --    ANION GAP mmol/L 6  --   --  12  --   --   --   --   --  14*   BUN mg/dL 28*  --   --  26*  --   --   --   --   --  25   CREATININE mg/dL 1 52*  --   --  1 64*  --   --   --   --   --  1 34*   EGFR ml/min/1 73sq m 48  --   --  43  --   --   --   --   --  55   CALCIUM mg/dL 8 2*  --   --  8 4  --   --   --   --   --  9 4   CALCIUM, IONIZED, ISTAT mmol/L  --   --  1 18  --  1 18 1 03* 1 08* 1 00*   < >  --    MAGNESIUM mg/dL 3 0*  --   --   --   --   --   --   --   --  1 8   PHOSPHORUS mg/dL  --   --   --   --   --   --   --   --   --  4 1    < > = values in this interval not displayed       Results from last 7 days   Lab Units 10/07/22  0742   AST U/L 20   ALT U/L 21   ALK PHOS U/L 94   TOTAL PROTEIN g/dL 7 2   ALBUMIN g/dL 3 7   TOTAL BILIRUBIN mg/dL 1 80*     Results from last 7 days   Lab Units 10/08/22  1002 10/08/22  0809 10/08/22  0167 10/08/22  0421 10/08/22  0156 10/07/22  2359 10/07/22  2309 10/07/22  2101   POC GLUCOSE mg/dl 109 111 142* 129 133 180* 187* 150*     Results from last 7 days   Lab Units 10/08/22  0406 10/07/22  2102 10/07/22  0742   GLUCOSE RANDOM mg/dL 155* 168* 155*         Results from last 7 days   Lab Units 10/07/22  1424   HEMOGLOBIN A1C % 5 2   EAG mg/dl 103     No results found for: BETA-HYDROXYBUTYRATE   Results from last 7 days   Lab Units 10/08/22  0406   PH ART  7 458*   PCO2 ART mm Hg 35 2*   PO2 ART mm Hg 145 9*   HCO3 ART mmol/L 24 4   BASE EXC ART mmol/L 1 0   O2 CONTENT ART mL/dL 20 2   O2 HGB, ARTERIAL % 98 1*   ABG SOURCE  Line, Arterial     Results from last 7 days   Lab Units 10/08/22  0406 10/07/22  2109   PH CHRISTIANO  7 415* 7 235*   PCO2 CHRISTIANO mm Hg 40 8* 50 2*   PO2 CHRISTIANO mm Hg 35 9 47 5*   HCO3 CHRISTIANO mmol/L 25 6 20 8*   BASE EXC CHRISTIANO mmol/L 1 0 -7 0   O2 CONTENT CHRISTIANO ml/dL 13 9 17 5   O2 HGB, VENOUS % 71 9 77 6     Results from last 7 days   Lab Units 10/07/22  2104 10/07/22  2000 10/07/22  1935 10/07/22  1906 10/07/22  1900 10/07/22  1818 10/07/22  1756   PH, CHRISTIANO I-STAT   --   --   --  7 188* 7 290*  --  7 155*   PCO2, CHRISTIANO ISTAT mm HG  --   --   --  75 5* 59 8*  --  65 3*   PO2, CHRISTIANO ISTAT mm HG  --   --   --  56 0* 312 0*  --  59 0*   HCO3, CHRISTIANO ISTAT mmol/L  --   --   --  28 7 28 8  --  23 0*   I STAT BASE EXC mmol/L -5* -3* -2 -1 1   < > -7*   I STAT O2 SAT % 99* 98* 100* 79 100*   < > 81   ISTAT PH ART  7 280* 7 320* 7 343*  --   --    < >  --    I STAT ART PCO2 mm HG 46 6* 44 3* 44 8*  --   --    < >  --    I STAT ART PO2 mm  0* 117 0 313 0*  --   --    < >  --    I STAT ART HCO3 mmol/L 21 9* 22 8 24 3  --   --    < >  --     < > = values in this interval not displayed           Results from last 7 days   Lab Units 10/07/22  1144 10/07/22  0957 10/07/22  0742   HS TNI 0HR ng/L  --   --  137*   HS TNI 2HR ng/L  --  124*  --    HSTNI D2 ng/L  --  -13  --    HS TNI 4HR ng/L 114*  --   --    HSTNI D4 ng/L -23  --   --      Results from last 7 days   Lab Units 10/07/22  0742   D-DIMER QUANTITATIVE ug/ml FEU 0 85*     Results from last 7 days   Lab Units 10/07/22  2102 10/07/22  1424 10/07/22  0742   PROTIME seconds 17 0* 14 0 12 8   INR  1 36* 1 01 0 90   PTT seconds 48*  --  26         Results from last 7 days   Lab Units 10/08/22  0411 10/07/22  1424 10/07/22  0742   PROCALCITONIN ng/ml 2 88* 0 62* 0 28*     Results from last 7 days   Lab Units 10/07/22  1045 10/07/22  0742   LACTIC ACID mmol/L 4 1* 3 7*             Results from last 7 days   Lab Units 10/07/22  0742   NT-PRO BNP pg/mL 2,080*                             Results from last 7 days   Lab Units 10/07/22  2358 10/07/22  1430   CLARITY UA  Clear Clear   COLOR UA  Light Yellow Yellow   SPEC GRAV UA  1 033* 1 025   PH UA  5 0 5 0   GLUCOSE UA mg/dl Negative Negative   KETONES UA mg/dl Negative Negative   BLOOD UA  Small* Negative   PROTEIN UA mg/dl Trace* Negative   NITRITE UA  Negative Negative   BILIRUBIN UA  Negative Negative   UROBILINOGEN UA (BE) mg/dl <2 0 <2 0   LEUKOCYTES UA  Negative Negative   WBC UA /hpf 1-2 None Seen   RBC UA /hpf 4-10* 0-1*   BACTERIA UA /hpf None Seen None Seen   EPITHELIAL CELLS WET PREP /hpf Occasional None Seen   MUCUS THREADS  Occasional* Occasional*     Results from last 7 days   Lab Units 10/07/22  1430 10/07/22  1144 10/07/22  0742   STREP PNEUMONIAE ANTIGEN, URINE  Negative  --   --    LEGIONELLA URINARY ANTIGEN  Negative  --   --    INFLUENZA A PCR   --  Negative Negative   INFLUENZA B PCR   --  Negative Negative   RSV PCR   --  Negative Negative                             Results from last 7 days   Lab Units 10/08/22  0325 10/07/22  0745 10/07/22  8280 BLOOD CULTURE  Received in Microbiology Lab  Culture in Progress  Received in Microbiology Lab  Culture in Progress  Received in Microbiology Lab  Culture in Progress  Received in Microbiology Lab  Culture in Progress  Past Medical History:   Diagnosis Date   • ASD (atrial septal defect)     s/p repair   • Hyperlipidemia    • Severe mitral regurgitation     s/p repair     Present on Admission:  • Cardiogenic shock (HCC)  • Severe mitral regurgitation  • CON (acute kidney injury) ([facility] [address] )  • Acute respiratory failure with hypoxia (HCC)  • Acute mitral valve rupture (HCC)  • Transaminitis      Admitting Diagnosis: No admission diagnoses are documented for this encounter    Age/Sex: 60 y o  male  Admission Orders:  Scheduled Medications:  acetaminophen, 975 mg, Oral, Q8H  Albumin 5%, 12 5 g, Intravenous, Once  amiodarone, 200 mg, Oral, Q8H STONE  aspirin, 325 mg, Oral, Daily  atorvastatin, 40 mg, Oral, Daily With Dinner  cefepime, 2,000 mg, Intravenous, Q12H  chlorhexidine, 15 mL, Swish & Spit, BID  docusate sodium, 100 mg, Oral, BID  heparin (porcine), 5,000 Units, Subcutaneous, Q8H STONE  mupirocin, 1 application, Nasal, B49H STONE  pantoprazole, 40 mg, Oral, Daily  polyethylene glycol, 17 g, Oral, Daily      Continuous IV Infusions:  dexmedetomidine, 0 1-1 2 mcg/kg/hr, Intravenous, Titrated  epinephrine, 1-10 mcg/min, Intravenous, Titrated  insulin regular (HumuLIN R,NovoLIN R) infusion, 0 3-21 Units/hr, Intravenous, Titrated  niCARdipine, 2 5-15 mg/hr, Intravenous, Titrated  norepinephrine, 1-30 mcg/min, Intravenous, Titrated  sodium chloride, 20 mL/hr, Intravenous, Continuous  vasopressin (PITRESSIN) in 0 9 % sodium chloride 100 mL, 0 04 Units/min, Intravenous, Continuous      PRN Meds:  bisacodyl, 10 mg, Rectal, Daily PRN  fentanyl citrate (PF), 50 mcg, Intravenous, Q1H PRN  HYDROmorphone, 0 5 mg, Intravenous, Q2H PRN  lidocaine (cardiac), 100 mg, Intravenous, Q30 Min PRN  ondansetron, 4 mg, Intravenous, Q6H PRN  oxyCODONE, 2 5 mg, Oral, Q4H PRN  oxyCODONE, 5 mg, Oral, Q4H PRN  potassium chloride, 20 mEq, Intravenous, Once PRN  potassium chloride, 20 mEq, Intravenous, Q1H PRN  potassium chloride, 20 mEq, Intravenous, Q30 Min PRN      NPO    maintain PA catheter, arterial line, epicardial pacing wires, baer    Ct to suction,    Incentive spirometry    Daily weight, I/O      VBG,  CBC, Mg, BMP , PCXR 10/9 am      IP CONSULT TO MEDICAL CRITICAL CARE  IP CONSULT TO CASE MANAGEMENT    Network Utilization Review Department  ATTENTION: Please call with any questions or concerns to [phone] and carefully listen to the prompts so that you are directed to the right person  All voicemails are confidential   [facility] all requests for admission clinical reviews, approved or denied determinations and any other requests to dedicated fax number below belonging to the campus where the patient is receiving treatment   List of dedicated fax numbers for the Facilities:  [address] DENIALS (Administrative/Medical Necessity) [phone]   [address] (Maternity/NICU/Pediatrics) [phone]   [address] [phone]   [facility] [phone]   [address] Medical [place] [address] [address] [address] [phone]   [address] [phone]

## 2022-10-08 NOTE — PHYSICAL THERAPY NOTE
10/08/22 0743   Note Type   Note type Evaluation; Cancelled Session   Cancel Reasons Intubated/sedated   Eval deferred- pt currently intubated/sedated  Will follow    Sheba Mendoza PT, DPT CSRS

## 2022-10-08 NOTE — PROGRESS NOTES
Progress Note - Cardiothoracic Surgery   Retia Runner 60 y o  male MRN: [id number]  Unit/Bed#: [facility] 414-01 Encounter: [id number]      POD # 1 s/p Emergent Mitral Valve Repair for apparent acute severe mitral regurgitation with cardiogenic shock  Pt seen/examined  Interval history and data reviewed with critical care team     Patient has had persistent fevers overnight  CXR shows bilateral diffuse airspace disease  Unclear if this is a reaction to acute severe MR or if there is an underlying infectious process  COVID test negative x2  He is vaccinated x2 and received 1 booster dose  Remains on IV Cefepime  Remains intubated but likely ready for extubation this AM     Epi gtt 2  Levo gtt 11  Likely that fever is contributing to need for vasoconstrictors      IABP 1:1       Medications:   Scheduled Meds:  Current Facility-Administered Medications   Medication Dose Route Frequency Provider Last Rate   • acetaminophen  975 mg Oral Q8H Letitia Cheek PA-C     • amiodarone  200 mg Oral Q8H [address] Letitia Cheek PA-C     • aspirin  325 mg Oral Daily Letitia Cheek PA-C     • atorvastatin  40 mg Oral Daily With GAYLA Solares     • bisacodyl  10 mg Rectal Daily PRN Letitia Cheek PA-C     • cefepime  2,000 mg Intravenous Q12H Nettie Gottron, PA-C Stopped (10/08/22 0400)   • chlorhexidine  15 mL Swish & Spit BID Letitia Cheek PA-C     • dexmedetomidine  0 1-1 2 mcg/kg/hr Intravenous Titrated Nettie Gottron, PA-C 0 4 mcg/kg/hr (10/08/22 0940)   • docusate sodium  100 mg Oral BID Letitia Cheek PA-C     • epinephrine  1-10 mcg/min Intravenous Titrated Letitia Cheek PA-C 1 mcg/min (10/08/22 0913)   • fentanyl citrate (PF)  50 mcg Intravenous Q1H PRN Letitia Cheek PA-C     • heparin (porcine)  5,000 Units Subcutaneous [facility] Nettie Gottron, PA-C     • HYDROmorphone  0 5 mg Intravenous Q2H PRN Letitia Cheek PA-C     • insulin regular (HumuLIN R,NovoLIN R) infusion  0 3-21 Units/hr Intravenous Titrated Letitia Cheek PA-C 1 5 Units/hr (10/08/22 0809)   • lidocaine (cardiac)  100 mg Intravenous Q30 Min PRN Letitia Cheek PA-C     • mupirocin  1 application Nasal F87M [address] Letitia Cheek PA-C     • niCARdipine  2 5-15 mg/hr Intravenous Titrated Letitia Cheek PA-C     • norepinephrine  1-30 mcg/min Intravenous Titrated Letitia Cheek PA-C 10 mcg/min (10/08/22 0530)   • ondansetron  4 mg Intravenous Q6H PRN Letitia Cheek PA-C     • oxyCODONE  2 5 mg Oral Q4H PRN Letitia Cheek PA-C     • oxyCODONE  5 mg Oral Q4H PRN Letitia Cheek PA-C     • pantoprazole  40 mg Oral Daily Letitia Cheek PA-C     • polyethylene glycol  17 g Oral Daily Letitia Cheek PA-C     • potassium chloride  20 mEq Intravenous Once PRN Letitia Cheek PA-C     • potassium chloride  20 mEq Intravenous Q1H PRN Letitia Cheek PA-C     • potassium chloride  20 mEq Intravenous Q30 Min PRN Letitia Cheek PA-C     • sodium chloride  20 mL/hr Intravenous Continuous Letitia Cheek PA-C 20 mL/hr (10/07/22 2130)   • vasopressin (PITRESSIN) in 0 9 % sodium chloride 100 mL  0 04 Units/min Intravenous Continuous Megan Quinteros PA-C       Continuous Infusions:dexmedetomidine, 0 1-1 2 mcg/kg/hr, Last Rate: 0 4 mcg/kg/hr (10/08/22 0940)  epinephrine, 1-10 mcg/min, Last Rate: 1 mcg/min (10/08/22 0913)  insulin regular (HumuLIN R,NovoLIN R) infusion, 0 3-21 Units/hr, Last Rate: 1 5 Units/hr (10/08/22 0809)  niCARdipine, 2 5-15 mg/hr  norepinephrine, 1-30 mcg/min, Last Rate: 10 mcg/min (10/08/22 0530)  sodium chloride, 20 mL/hr, Last Rate: 20 mL/hr (10/07/22 2130)  vasopressin (PITRESSIN) in 0 9 % sodium chloride 100 mL, 0 04 Units/min      PRN Meds: •  bisacodyl  •  fentanyl citrate (PF)  •  HYDROmorphone  •  lidocaine (cardiac)  •  ondansetron  •  oxyCODONE  •  oxyCODONE  •  potassium chloride  •  potassium chloride  •  potassium chloride    Vitals: Blood pressure 115/72, pulse 79, temperature (!) 102 2 °F (39 °C), resp  rate 16, weight 91 2 kg (201 lb 1 oz), SpO2 99 %  ,Body mass index is 26 53 kg/m²  I/O last 24 hours: In: 4943 5 [I V :3338 5; NG/GT:80; IV Piggyback:850]  Out: 2935 [Urine:2070; Emesis/NG output:75; Blood:680; Chest Tube:110]  Invasive Devices  Report    Central Venous Catheter Line  Duration           CVC Central Lines 10/07/22 Triple <1 day    Introducer 10/07/22 <1 day          Peripheral Intravenous Line  Duration           Peripheral IV 10/07/22 Left Antecubital 1 day    Peripheral IV 10/07/22 Left Hand <1 day    Peripheral IV 10/07/22 Right Forearm <1 day          Arterial Line  Duration           Arterial Line 10/07/22 Radial <1 day          Line  Duration           Arterial Sheath 8 Fr  Right Femoral <1 day    Pacer Wires <1 day    Pacer Wires <1 day          Drain  Duration           Chest Tube 1 Posterior;Mediastinal 32 Fr  <1 day    Chest Tube 2 Anterior;Mediastinal 32 Fr  <1 day    NG/OG/Enteral Tube Orogastric Right mouth <1 day    Urethral Catheter Temperature probe 16 Fr  <1 day                  Lab, Imaging and other studies:   Results from last 7 days   Lab Units 10/08/22  0406 10/07/22  2104 10/07/22  2102 10/07/22  1935 10/07/22  1929 10/07/22  1423 10/07/22  0742   WBC Thousand/uL 19 15*  --   --   --   --   --  22 68*   HEMOGLOBIN g/dL 13 7  --  15 2  --   --   --  17 5*   I STAT HEMOGLOBIN g/dl  --  13 9  --    < >  --    < >  --    HEMATOCRIT % 39 3  --  44 6  --   --   --  49 7*   HEMATOCRIT, ISTAT %  --  41  --    < >  --    < >  --    PLATELETS Thousands/uL 199  --  224  --  230   < > 322    < > = values in this interval not displayed       Results from last 7 days   Lab Units 10/08/22  0406 10/07/22  2358 10/07/22  2104 10/07/22  2102 10/07/22  1423 10/07/22  0742   POTASSIUM mmol/L 4 3 4 2  --  4 2  --  4 8   CHLORIDE mmol/L 112*  --   --  111*  --  100   CO2 mmol/L 24  --   --  20*  --  21   CO2, I-STAT mmol/L  --   --  23  --    < >  --    BUN mg/dL 28*  --   --  26*  --  25 CREATININE mg/dL 1 52*  --   --  1 64*  --  1 34*   GLUCOSE, ISTAT mg/dl  --   --  164*  --    < >  --    CALCIUM mg/dL 8 2*  --   --  8 4  --  9 4    < > = values in this interval not displayed  Results from last 7 days   Lab Units 10/07/22  2102 10/07/22  1424 10/07/22  0742   INR  1 36* 1 01 0 90   PTT seconds 48*  --  26     Recent Labs     10/08/22  0406   PHART 7 458*   [id number] 24 4   PO2ART 145 9*   [id number] 35 2*   BEART 1 0           Plan:    Cont  [place]/C[place]is/Shara/Demarco  Continue chest tubes, pacing wires  Possible extubation today  Continue antibiotics for now  PRN diuresis  Continue IV Cefepime for the time being        Yuliana Iqbal  DATE: October 8, 2022  TIME: 9:48 AM

## 2022-10-08 NOTE — RESPIRATORY THERAPY NOTE
RT Protocol Note  Crystal Gaxiola 60 y o  male MRN: [id number]  Unit/Bed#: [facility] 414-01 Encounter: [id number]    Assessment    Principal Problem:    Acute mitral valve rupture ([address] )  Active Problems:    Acute respiratory failure with hypoxia (HCC)    CON (acute kidney injury) (HCC)    Severe mitral regurgitation    Cardiogenic shock (HCC)    Transaminitis    History of repair of congenital atrial septal defect (ASD)      Home Pulmonary Medications:  na       Past Medical History:   Diagnosis Date    ASD (atrial septal defect)     s/p repair    Hyperlipidemia     Severe mitral regurgitation     s/p repair     Social History     Socioeconomic History    Marital status: /Civil Union     Spouse name: Not on file    Number of children: Not on file    Years of education: Not on file    Highest education level: Not on file   Occupational History    Not on file   Tobacco Use    Smoking status: Never Smoker    Smokeless tobacco: Never Used   Substance and Sexual Activity    Alcohol use: Not Currently    Drug use: Not Currently    Sexual activity: Not on file   Other Topics Concern    Not on file   Social History Narrative    Not on file     Social Determinants of Health     Financial Resource Strain: Not on file   Food Insecurity: Not on file   Transportation Needs: Not on file   Physical Activity: Not on file   Stress: Not on file   Social Connections: Not on file   Intimate Partner Violence: Not on file   Housing Stability: Not on file       Subjective         Objective    Physical Exam:   Assessment Type: Assess only  General Appearance: Sedated  Respiratory Pattern: Assisted  Chest Assessment: Chest expansion symmetrical  Bilateral Breath Sounds: Diminished, Clear  Cough: Non-productive  Suction: ET Tube  O2 Device: Ventilator    Vitals:  SpO2 98 %  Imaging and other studies: I have personally reviewed pertinent reports        O2 Device: Ventilator     Plan    Respiratory Plan: Vent/NIV/HFNC  Airway Clearance Plan: Incentive Spirometer     Resp Comments: (P) Pt  received from OR intubated and placed on Phillip G5 ventilator S/P MVR  Pt's breath sounds are diminished but clear bilaterally at this time  CXR pending for ETT placement  Pt  has no known Pulmonary Hx  and bronchodilators are not indicated at this time  Pt  will be instructed on IS once extubated from ventilator  Will continue to assess and monitor pt per Respiratory protocol

## 2022-10-08 NOTE — RESPIRATORY THERAPY NOTE
RT Ventilator Management Note  Willie Hoang 60 y o  male MRN: [id number]  Unit/Bed#: [facility] 414-01 Encounter: [id number]      Daily Screen         10/7/2022  2100             Patient safety screen outcome[de-identified] Failed    Not Ready for Weaning due to[de-identified] Hemodynamically unstable              Physical Exam:   Assessment Type: Assess only  General Appearance: Sedated  Respiratory Pattern: Assisted  Chest Assessment: Chest expansion symmetrical  Bilateral Breath Sounds: (P) Diminished, Clear  Cough: Non-productive  Suction: ET Tube  O2 Device: Ventilator      Resp Comments: (P) Pt  remains on CMV/VC mode  Will continue to assess and monitor pt per protocol

## 2022-10-08 NOTE — ANESTHESIA POSTPROCEDURE EVALUATION
Post-Op Assessment Note    CV Status:  Stable       Mental Status:  Somnolent   Airway Patency:  Patent  Airway: intubated   There is a medical reason for not screening for obstructive sleep apnea and/or for not using two or more mitigation strategies   Post Op Vitals Reviewed: Yes      Staff: Anesthesiologist         No complications documented      BP      Temp      Pulse     Resp      SpO2        Full report to ICU NP; VSS on epi/norepi

## 2022-10-09 ENCOUNTER — APPOINTMENT (OUTPATIENT)
Dept: RADIOLOGY | Facility: HOSPITAL | Age: 62
DRG: 216 | End: 2022-10-09
Payer: COMMERCIAL

## 2022-10-09 PROBLEM — J96.01 ACUTE RESPIRATORY FAILURE WITH HYPOXIA (HCC): Status: RESOLVED | Noted: 2022-10-07 | Resolved: 2022-10-09

## 2022-10-09 LAB
ABO GROUP BLD BPU: NORMAL
ANION GAP SERPL CALCULATED.3IONS-SCNC: 6 MMOL/L (ref 4–13)
BACTERIA BLD CULT: NORMAL
BACTERIA BLD CULT: NORMAL
BASE EX.OXY STD BLDV CALC-SCNC: 64.8 % (ref 60–80)
BASE EXCESS BLDV CALC-SCNC: 1.3 MMOL/L
BPU ID: NORMAL
BUN SERPL-MCNC: 36 MG/DL (ref 5–25)
CALCIUM SERPL-MCNC: 8.1 MG/DL (ref 8.3–10.1)
CHLORIDE SERPL-SCNC: 109 MMOL/L (ref 96–108)
CO2 SERPL-SCNC: 26 MMOL/L (ref 21–32)
CREAT SERPL-MCNC: 1.11 MG/DL (ref 0.6–1.3)
ERYTHROCYTE [DISTWIDTH] IN BLOOD BY AUTOMATED COUNT: 13.1 % (ref 11.6–15.1)
GFR SERPL CREATININE-BSD FRML MDRD: 70 ML/MIN/1.73SQ M
GLUCOSE SERPL-MCNC: 120 MG/DL (ref 65–140)
GLUCOSE SERPL-MCNC: 121 MG/DL (ref 65–140)
GLUCOSE SERPL-MCNC: 125 MG/DL (ref 65–140)
GLUCOSE SERPL-MCNC: 128 MG/DL (ref 65–140)
GLUCOSE SERPL-MCNC: 129 MG/DL (ref 65–140)
GLUCOSE SERPL-MCNC: 130 MG/DL (ref 65–140)
GLUCOSE SERPL-MCNC: 133 MG/DL (ref 65–140)
GLUCOSE SERPL-MCNC: 134 MG/DL (ref 65–140)
GLUCOSE SERPL-MCNC: 141 MG/DL (ref 65–140)
HCO3 BLDV-SCNC: 25.3 MMOL/L (ref 24–30)
HCT VFR BLD AUTO: 34 % (ref 36.5–49.3)
HGB BLD-MCNC: 11.3 G/DL (ref 12–17)
MAGNESIUM SERPL-MCNC: 2.6 MG/DL (ref 1.6–2.6)
MCH RBC QN AUTO: 30 PG (ref 26.8–34.3)
MCHC RBC AUTO-ENTMCNC: 33.2 G/DL (ref 31.4–37.4)
MCV RBC AUTO: 90 FL (ref 82–98)
NASAL CANNULA: 4
O2 CT BLDV-SCNC: 11.3 ML/DL
PCO2 BLDV: 37.7 MM HG (ref 42–50)
PH BLDV: 7.44 [PH] (ref 7.3–7.4)
PLATELET # BLD AUTO: 109 THOUSANDS/UL (ref 149–390)
PMV BLD AUTO: 12.2 FL (ref 8.9–12.7)
PO2 BLDV: 33.2 MM HG (ref 35–45)
POTASSIUM SERPL-SCNC: 3.9 MMOL/L (ref 3.5–5.3)
RBC # BLD AUTO: 3.77 MILLION/UL (ref 3.88–5.62)
SODIUM SERPL-SCNC: 141 MMOL/L (ref 135–147)
UNIT DISPENSE STATUS: NORMAL
UNIT PRODUCT CODE: NORMAL
UNIT PRODUCT VOLUME: 280 ML
UNIT RH: NORMAL
WBC # BLD AUTO: 14.78 THOUSAND/UL (ref 4.31–10.16)

## 2022-10-09 PROCEDURE — 85027 COMPLETE CBC AUTOMATED: CPT | Performed by: PHYSICIAN ASSISTANT

## 2022-10-09 PROCEDURE — 82805 BLOOD GASES W/O2 SATURATION: CPT | Performed by: PHYSICIAN ASSISTANT

## 2022-10-09 PROCEDURE — 80048 BASIC METABOLIC PNL TOTAL CA: CPT | Performed by: PHYSICIAN ASSISTANT

## 2022-10-09 PROCEDURE — NC001 PR NO CHARGE: Performed by: STUDENT IN AN ORGANIZED HEALTH CARE EDUCATION/TRAINING PROGRAM

## 2022-10-09 PROCEDURE — 71045 X-RAY EXAM CHEST 1 VIEW: CPT

## 2022-10-09 PROCEDURE — 83735 ASSAY OF MAGNESIUM: CPT | Performed by: PHYSICIAN ASSISTANT

## 2022-10-09 PROCEDURE — 99024 POSTOP FOLLOW-UP VISIT: CPT | Performed by: THORACIC SURGERY (CARDIOTHORACIC VASCULAR SURGERY)

## 2022-10-09 PROCEDURE — 99233 SBSQ HOSP IP/OBS HIGH 50: CPT | Performed by: STUDENT IN AN ORGANIZED HEALTH CARE EDUCATION/TRAINING PROGRAM

## 2022-10-09 PROCEDURE — 82948 REAGENT STRIP/BLOOD GLUCOSE: CPT

## 2022-10-09 RX ORDER — INSULIN LISPRO 100 [IU]/ML
1-5 INJECTION, SOLUTION INTRAVENOUS; SUBCUTANEOUS
Status: DISCONTINUED | OUTPATIENT
Start: 2022-10-09 | End: 2022-10-11 | Stop reason: HOSPADM

## 2022-10-09 RX ORDER — FUROSEMIDE 10 MG/ML
40 INJECTION INTRAMUSCULAR; INTRAVENOUS
Status: DISCONTINUED | OUTPATIENT
Start: 2022-10-09 | End: 2022-10-11

## 2022-10-09 RX ORDER — FUROSEMIDE 10 MG/ML
20 INJECTION INTRAMUSCULAR; INTRAVENOUS ONCE
Status: COMPLETED | OUTPATIENT
Start: 2022-10-09 | End: 2022-10-09

## 2022-10-09 RX ORDER — POTASSIUM CHLORIDE 20 MEQ/1
20 TABLET, EXTENDED RELEASE ORAL 2 TIMES DAILY
Status: DISCONTINUED | OUTPATIENT
Start: 2022-10-09 | End: 2022-10-11 | Stop reason: HOSPADM

## 2022-10-09 RX ADMIN — POTASSIUM CHLORIDE 20 MEQ: 1500 TABLET, EXTENDED RELEASE ORAL at 17:33

## 2022-10-09 RX ADMIN — OXYCODONE HYDROCHLORIDE 5 MG: 5 TABLET ORAL at 05:41

## 2022-10-09 RX ADMIN — AMIODARONE HYDROCHLORIDE 200 MG: 200 TABLET ORAL at 14:11

## 2022-10-09 RX ADMIN — OXYCODONE HYDROCHLORIDE 5 MG: 5 TABLET ORAL at 19:20

## 2022-10-09 RX ADMIN — Medication 12.5 MG: at 10:59

## 2022-10-09 RX ADMIN — AMIODARONE HYDROCHLORIDE 200 MG: 200 TABLET ORAL at 21:31

## 2022-10-09 RX ADMIN — OXYCODONE HYDROCHLORIDE 5 MG: 5 TABLET ORAL at 14:59

## 2022-10-09 RX ADMIN — CEFEPIME 2000 MG: 2 INJECTION, POWDER, FOR SOLUTION INTRAVENOUS at 14:11

## 2022-10-09 RX ADMIN — ASPIRIN 325 MG ORAL TABLET 325 MG: 325 PILL ORAL at 08:29

## 2022-10-09 RX ADMIN — CEFEPIME 2000 MG: 2 INJECTION, POWDER, FOR SOLUTION INTRAVENOUS at 02:07

## 2022-10-09 RX ADMIN — ACETAMINOPHEN 975 MG: 325 TABLET, FILM COATED ORAL at 14:11

## 2022-10-09 RX ADMIN — HYDROMORPHONE HYDROCHLORIDE 0.5 MG: 1 INJECTION, SOLUTION INTRAMUSCULAR; INTRAVENOUS; SUBCUTANEOUS at 00:18

## 2022-10-09 RX ADMIN — POTASSIUM CHLORIDE 20 MEQ: 1500 TABLET, EXTENDED RELEASE ORAL at 08:29

## 2022-10-09 RX ADMIN — FUROSEMIDE 40 MG: 10 INJECTION, SOLUTION INTRAMUSCULAR; INTRAVENOUS at 08:28

## 2022-10-09 RX ADMIN — ATORVASTATIN CALCIUM 40 MG: 40 TABLET, FILM COATED ORAL at 17:33

## 2022-10-09 RX ADMIN — HEPARIN SODIUM 5000 UNITS: 5000 INJECTION INTRAVENOUS; SUBCUTANEOUS at 21:31

## 2022-10-09 RX ADMIN — MUPIROCIN 1 APPLICATION: 20 OINTMENT TOPICAL at 08:28

## 2022-10-09 RX ADMIN — HYDROMORPHONE HYDROCHLORIDE 0.5 MG: 1 INJECTION, SOLUTION INTRAMUSCULAR; INTRAVENOUS; SUBCUTANEOUS at 05:52

## 2022-10-09 RX ADMIN — ACETAMINOPHEN 975 MG: 325 TABLET, FILM COATED ORAL at 21:31

## 2022-10-09 RX ADMIN — ACETAMINOPHEN 975 MG: 325 TABLET, FILM COATED ORAL at 05:41

## 2022-10-09 RX ADMIN — FUROSEMIDE 20 MG: 10 INJECTION, SOLUTION INTRAMUSCULAR; INTRAVENOUS at 00:24

## 2022-10-09 RX ADMIN — FUROSEMIDE 40 MG: 10 INJECTION, SOLUTION INTRAMUSCULAR; INTRAVENOUS at 15:00

## 2022-10-09 RX ADMIN — AMIODARONE HYDROCHLORIDE 200 MG: 200 TABLET ORAL at 05:41

## 2022-10-09 RX ADMIN — HEPARIN SODIUM 5000 UNITS: 5000 INJECTION INTRAVENOUS; SUBCUTANEOUS at 05:42

## 2022-10-09 RX ADMIN — DOCUSATE SODIUM 100 MG: 100 CAPSULE, LIQUID FILLED ORAL at 08:29

## 2022-10-09 RX ADMIN — VASOPRESSIN 0.04 UNITS/MIN: 20 INJECTION INTRAVENOUS at 02:07

## 2022-10-09 RX ADMIN — PANTOPRAZOLE SODIUM 40 MG: 40 TABLET, DELAYED RELEASE ORAL at 08:29

## 2022-10-09 RX ADMIN — MUPIROCIN 1 APPLICATION: 20 OINTMENT TOPICAL at 21:31

## 2022-10-09 RX ADMIN — POLYETHYLENE GLYCOL 3350 17 G: 17 POWDER, FOR SOLUTION ORAL at 08:29

## 2022-10-09 RX ADMIN — HEPARIN SODIUM 5000 UNITS: 5000 INJECTION INTRAVENOUS; SUBCUTANEOUS at 14:11

## 2022-10-09 RX ADMIN — Medication 12.5 MG: at 21:39

## 2022-10-09 NOTE — PROGRESS NOTES
Progress Note - Cardiothoracic Surgery   Retia Runner 60 y o  male MRN: [id number]  Unit/Bed#: [facility] 414-01 Encounter: [id number]      POD # 2 s/p Emergent Mitral Valve Repair for apparent acute severe mitral regurgitation with cardiogenic shock  Pt seen/examined  Interval history and data reviewed with critical care team     Clinically much improved since yesterday  All vasoactive gtts off  Extubated and comfortably lying in bed  Remains on IV Cefepime for now      IABP 1:1 but ready for removal       Medications:   Scheduled Meds:  Current Facility-Administered Medications   Medication Dose Route Frequency Provider Last Rate   • acetaminophen  975 mg Oral Q8H Letitia Cheek PA-C     • amiodarone  200 mg Oral Q8H [address] Letitia Cheek PA-C     • aspirin  325 mg Oral Daily Letitia Cheek PA-C     • atorvastatin  40 mg Oral Daily With GAYLA Solares     • bisacodyl  10 mg Rectal Daily PRN Letitia Cheek PA-C     • cefepime  2,000 mg Intravenous Q12H Nettie Gottron, PA-C Stopped (10/09/22 0300)   • docusate sodium  100 mg Oral BID Letitia Cheek PA-C     • furosemide  40 mg Intravenous BID (diuretic) Jacquelyn Choi PA-C     • heparin (porcine)  5,000 Units Subcutaneous [facility] Nettie Gottron, PA-C     • HYDROmorphone  0 5 mg Intravenous Q2H PRN Letitia Cheek PA-C     • insulin lispro  1-5 Units Subcutaneous TID AC Megan Quinteros PA-C     • insulin lispro  1-5 Units Subcutaneous HS Megan Quinteros PA-C     • lidocaine (cardiac)  100 mg Intravenous Q30 Min PRN Letitia Cheek PA-C     • metoprolol tartrate  12 5 mg Oral Q12H [address] Megan Quinteros PA-C     • mupirocin  1 application Nasal E36M [address] Letitia Cheek PA-C     • ondansetron  4 mg Intravenous Q6H PRN Letitia Cheek PA-C     • oxyCODONE  2 5 mg Oral Q4H PRN Letitia Dornseif, PA-C     • oxyCODONE  5 mg Oral Q4H PRN Letitia Cheek PA-C     • pantoprazole  40 mg Oral Daily Letitia Cheek PA-C     • polyethylene glycol  17 g Oral Daily Letitia Cheek PA-C     • potassium chloride  20 mEq Oral BID Megan Quinteros PA-C       Continuous Infusions:   PRN Meds: •  bisacodyl  •  HYDROmorphone  •  lidocaine (cardiac)  •  ondansetron  •  oxyCODONE  •  oxyCODONE    Vitals: Blood pressure 115/64, pulse 71, temperature 98 78 °F (37 1 °C), resp  rate 16, weight 89 kg (196 lb 3 4 oz), SpO2 93 %  ,Body mass index is 25 89 kg/m²  I/O last 24 hours: In: 2094 3 [P O :480; I V :1264 3; IV Piggyback:350]  Out: 5662 [Urine:3217; Chest Tube:180]  Invasive Devices  Report    Central Venous Catheter Line  Duration           CVC Central Lines 10/07/22 Triple 1 day    Introducer 10/07/22 1 day          Peripheral Intravenous Line  Duration           Peripheral IV 10/07/22 Left Antecubital 2 days    Peripheral IV 10/07/22 Right Forearm 2 days    Peripheral IV 10/07/22 Left Hand 1 day          Arterial Line  Duration           Arterial Line 10/07/22 Radial 1 day          Line  Duration           Arterial Sheath 8 Fr  Right Femoral 1 day    Pacer Wires 1 day    Pacer Wires 1 day          Drain  Duration           Chest Tube 1 Posterior;Mediastinal 32 Fr  1 day    Chest Tube 2 Anterior;Mediastinal 32 Fr  1 day    Urethral Catheter Temperature probe 16 Fr  1 day                  Lab, Imaging and other studies:   Results from last 7 days   Lab Units 10/09/22  0405 10/08/22  0406 10/07/22  2104 10/07/22  2102 10/07/22  1423 10/07/22  0742   WBC Thousand/uL 14 78* 19 15*  --   --   --  22 68*   HEMOGLOBIN g/dL 11 3* 13 7  --  15 2  --  17 5*   I STAT HEMOGLOBIN g/dl  --   --  13 9  --    < >  --    HEMATOCRIT % 34 0* 39 3  --  44 6  --  49 7*   HEMATOCRIT, ISTAT %  --   --  41  --    < >  --    PLATELETS Thousands/uL 109* 199  --  224   < > 322    < > = values in this interval not displayed       Results from last 7 days   Lab Units 10/09/22  0405 10/08/22  0406 10/07/22  2358 10/07/22  2104 10/07/22  2102   POTASSIUM mmol/L 3 9 4 3 4 2  --  4 2   CHLORIDE mmol/L 109* 112* --   --  111*   CO2 mmol/L 26 24  --   --  20*   CO2, I-STAT mmol/L  --   --   --  23  --    BUN mg/dL 36* 28*  --   --  26*   CREATININE mg/dL 1 11 1 52*  --   --  1 64*   GLUCOSE, ISTAT mg/dl  --   --   --  164*  --    CALCIUM mg/dL 8 1* 8 2*  --   --  8 4     Results from last 7 days   Lab Units 10/07/22  2102 10/07/22  1424 10/07/22  0742   INR  1 36* 1 01 0 90   PTT seconds 48*  --  26     Recent Labs     10/08/22  0406   PHART 7 458*   [id number] 24 4   PO2ART 145 9*   [id number] 35 2*   BEART 1 0           Plan:    DC IABP  Possible removal of [place]ara/Demarco later today  Continue chest tubes, pacing wires  Continue antibiotics for now  Gentle diuresis  Continue IV Cefepime for the time being        Karlos Morales  DATE: October 9, 2022  TIME: 10:35 AM

## 2022-10-09 NOTE — PROGRESS NOTES
Progress Note - Critical Care   Pamela Livingston 60 y o  male MRN: [id number]  Unit/Bed#: [facility] 414-01 Encounter: [id number]      Attending Physician: Reed Jackson MD    24 Hour Events/HPI: POD # 2 s/p complex MV repair and IABP insertion (cath lab)  Weaned off all pressors  Extubated  Given lasix x2 doses over alst 24 hours with good response  Creat improved  Cultures have no growth to date  ROS: Review of Systems   Respiratory: Negative for chest tightness and shortness of breath  Cardiovascular: Negative for chest pain  Gastrointestinal: Negative for nausea and vomiting  All other systems reviewed and are negative     ---------------------------------------------------------------------------------------------------------------------------------------------------------------------  Impressions:  1  Severe MR s/p complex MV repair  2  Cardiogenic shock  3  Pulm edema  4  ASD   5  CON versus CKD  6  Fevers      Plan:    Neuro:   · Pain controlled with: tylenol ATC and oxycodone PRN  · Regulate sleep/wake cycle  · Delirium precautions  · CAM-ICU daily  · Trend neuro exam    CV:   · Cardiac infusions: None  · MAP goal > 65 and CI >2 2  · Keep PA catheter  · Keep arterial line  · Remove IABP today  · Rhythm: NSR  · Follow rhythm on telemetry  · Lopressor 12 5 mg PO BID  · Epicardial pacing wires no longer required    Remove today  ·  mg QD  · Statin: Lipitor 40mg qHS  · Amiodarone 200 mg PO q8 hours     Lung:   · 4L NC; Continue incentive spirometry/Coughing/Deep breathing exercises  · Chest tube output remains persistently high; Continue chest tubes to suction today    GI:   · Continue PPI for stress ulcer prophylaxis  · Continue bowel regimen  · Trend abdominal exam and bowel function    FEN:   · Diuretic plan: Lasix 40 mg IV q BID  · K-dur 20 mEQ PO q BID  · Nutrition/diet plan: PO diet  · Replenish electrolytes with goals: K >4 0, Mag >2 0, and Phos >3 0    :   · Indwelling Demarco present: yes   · Keep Demarco  · Trend UOP and BUN/creat  · Strict I and O    ID:   · Source of infection: fevers  · ABX ordered: cefepime  · Day # 2 of ? Course  · Cultures pending  · Trend temps and WBC count  · Maintain normothermia    Results from last 7 days   Lab Units 10/08/22  0411 10/07/22  1424 10/07/22  0742   PROCALCITONIN ng/ml 2 88* 0 62* 0 28*     Heme:   · Trend hgb and plts    Endo:   · Glycemic control plan: Glucose well-controlled   Discontinue continuous insulin infusion and add Insulin sliding scale coverage    Results from last 6 Months   Lab Units 10/07/22  1424   HEMOGLOBIN A1C % 5 2     MSK/Skin:  · Mobility goal: OOB after IABP bedrest  · PT consult: yes  · OT consult: yes  · Frequent turning and pressure off-loading  · Local wound care as needed    Disposition:  Continue ICU care  ---------------------------------------------------------------------------------------------------------------------------------------------------------------------  Allergies: No Known Allergies    Medications:     VTE Pharmacologic Prophylaxis: Heparin SQ  VTE Mechanical Prophylaxis: sequential compression device    Scheduled Meds:   acetaminophen, 975 mg, Q8H  amiodarone, 200 mg, Q8H STONE  aspirin, 325 mg, Daily  atorvastatin, 40 mg, Daily With Dinner  cefepime, 2,000 mg, Q12H  chlorhexidine, 15 mL, BID  docusate sodium, 100 mg, BID  heparin (porcine), 5,000 Units, Q8H [address]  mupirocin, 1 application, W50X [address]  pantoprazole, 40 mg, Daily  polyethylene glycol, 17 g, Daily       Continuous Infusions:  dexmedetomidine, 0 1-1 2 mcg/kg/hr, Last Rate: Stopped (10/08/22 1055)  epinephrine, 1-10 mcg/min, Last Rate: Stopped (10/08/22 1214)  insulin regular (HumuLIN R,NovoLIN R) infusion, 0 3-21 Units/hr, Last Rate: 0 5 Units/hr (10/09/22 0000)  niCARdipine, 2 5-15 mg/hr  norepinephrine, 1-30 mcg/min, Last Rate: Stopped (10/09/22 0435)  sodium chloride, 20 mL/hr, Last Rate: 20 mL/hr (10/07/22 2130)  vasopressin (PITRESSIN) in 0 9 % sodium chloride 100 mL, 0 04 Units/min, Last Rate: Stopped (10/09/22 0400)      PRN Meds:  bisacodyl, 10 mg, Daily PRN  fentanyl citrate (PF), 50 mcg, Q1H PRN  HYDROmorphone, 0 5 mg, Q2H PRN  lidocaine (cardiac), 100 mg, Q30 Min PRN  ondansetron, 4 mg, Q6H PRN  oxyCODONE, 2 5 mg, Q4H PRN  oxyCODONE, 5 mg, Q4H PRN  potassium chloride, 20 mEq, Once PRN  potassium chloride, 20 mEq, Q1H PRN  potassium chloride, 20 mEq, Q30 Min PRN      ---------------------------------------------------------------------------------------------------------------------------------------------------------------------  Vitals:   Vitals:    10/09/22 0300 10/09/22 0400 10/09/22 0500 10/09/22 0543   BP: 117/79 121/81 112/85    BP Location: Left arm Left arm     Pulse: 69 69 69    Resp: (!) 25 (!) 29 (!) 23    Temp: 99 5 °F (37 5 °C) 99 5 °F (37 5 °C) 99 14 °F (37 3 °C)    TempSrc: Bladder Bladder     SpO2: 92% 95% 95%    Weight:    89 kg (196 lb 3 4 oz)     Arterial Line:  Arterial Line BP: 118/65  Arterial Line MAP (mmHg): 90 mmHg    Tele Rhythm: NSR (This was personally reviewed by myself )    Respiratory:  SpO2: SpO2: 95 %  SpO2 Device: O2 Device: Nasal cannula  Nasal Cannula O2 Flow Rate (L/min): 4 L/min    Ventilator:  Respiratory  Report   Lab Data (Last 4 hours)    None         O2/Vent Data (Last 4 hours)    None              Temperature: Temp (24hrs), Av 5 °F (38 1 °C), Min:99 14 °F (37 3 °C), Max:102 56 °F (39 2 °C)  Current: Temperature: 99 14 °F (37 3 °C)    Weights:   Weight (last 2 days)     Date/Time Weight    10/09/22 0543 89 (196 21)    10/08/22 0537 91 2 (201 06)        Body mass index is 25 89 kg/m²      Hemodynamic Monitoring:  PAP: PAP: 32/15  CVP: CVP (mean): 14 mmHg  CO: CO (L/min): 6 6 L/min  CI: CI (L/min/m2): 3 1 L/min/m2  SVR: SVR (dyne*sec)/cm5: 921 (dyne*sec)/cm5    Intake and Outputs:    Intake/Output Summary (Last 24 hours) at 10/9/2022 0559  Last data filed at 10/9/2022 0400  Gross per 24 hour   Intake 2234 66 ml   Output 2627 ml   Net -392 34 ml     I/O last 24 hours: In: 6952 7 [P O :480; I V :4517 7; NG/GT:80; IV Piggyback:1200]  Out: 9123 [BWYAB:4971; Emesis/NG output:75; Blood:675; Chest Tube:250]    UOP: /hour   BM: 0 in the last 24 hours    Chest tube Output:  Mediastinal tubes: 10 mL/8 hours  140 mL/24 hours   Pleural tubes: --- mL/8 hours  --- mL/24 hours     Labs:  Results from last 7 days   Lab Units 10/09/22  0405 10/08/22  0406 10/07/22  2104 10/07/22  2102 10/07/22  1423 10/07/22  0742   WBC Thousand/uL 14 78* 19 15*  --   --   --  22 68*   HEMOGLOBIN g/dL 11 3* 13 7  --  15 2  --  17 5*   I STAT HEMOGLOBIN g/dl  --   --  13 9  --    < >  --    HEMATOCRIT % 34 0* 39 3  --  44 6  --  49 7*   HEMATOCRIT, ISTAT %  --   --  41  --    < >  --    PLATELETS Thousands/uL 109* 199  --  224   < > 322   NEUTROS PCT %  --   --   --   --   --  86*   MONOS PCT %  --   --   --   --   --  6    < > = values in this interval not displayed  Results from last 7 days   Lab Units 10/09/22  0405 10/08/22  0406 10/07/22  2358 10/07/22  2104 10/07/22  2102 10/07/22  2000 10/07/22  1935 10/07/22  1423 10/07/22  0742   SODIUM mmol/L 141 142  --   --  143  --   --   --  135   POTASSIUM mmol/L 3 9 4 3 4 2  --  4 2  --   --   --  4 8   CHLORIDE mmol/L 109* 112*  --   --  111*  --   --   --  100   CO2 mmol/L 26 24  --   --  20*  --   --   --  21   CO2, I-STAT mmol/L  --   --   --  23  --  24 26   < >  --    BUN mg/dL 36* 28*  --   --  26*  --   --   --  25   CREATININE mg/dL 1 11 1 52*  --   --  1 64*  --   --   --  1 34*   CALCIUM mg/dL 8 1* 8 2*  --   --  8 4  --   --   --  9 4   ALK PHOS U/L  --   --   --   --   --   --   --   --  94   ALT U/L  --   --   --   --   --   --   --   --  21   AST U/L  --   --   --   --   --   --   --   --  20   GLUCOSE, ISTAT mg/dl  --   --   --  164*  --  180* 174*   < >  --     < > = values in this interval not displayed       Baseline Creat: ?    Results from last 7 days   Lab Units 10/09/22  0405 10/08/22  0406 10/07/22  0742   MAGNESIUM mg/dL 2 6 3 0* 1 8     Results from last 7 days   Lab Units 10/07/22  0742   PHOSPHORUS mg/dL 4 1      Results from last 7 days   Lab Units 10/07/22  2102 10/07/22  1424 10/07/22  0742   INR  1 36* 1 01 0 90   PTT seconds 48*  --  26     Results from last 7 days   Lab Units 10/07/22  1045 10/07/22  0742   LACTIC ACID mmol/L 4 1* 3 7*     Results from last 7 days   Lab Units 10/08/22  0406   PH ART  7 458*   PCO2 ART mm Hg 35 2*   PO2 ART mm Hg 145 9*   HCO3 ART mmol/L 24 4   BASE EXC ART mmol/L 1 0   ABG SOURCE  Line, Arterial     SVO2: 64%    Micro:   Blood Culture:   Lab Results   Component Value Date    BLOODCX Received in Microbiology Lab  Culture in Progress  10/08/2022    BLOODCX Received in Microbiology Lab  Culture in Progress  10/08/2022    BLOODCX No Growth at 24 hrs  10/07/2022    BLOODCX No Growth at 24 hrs  10/07/2022     Urine Culture: No results found for: URINECX  Sputum Culture: No components found for: SPUTUMCX  Wound Culure: No results found for: WOUNDCULT    Diagnositic Studies:  10/09/22 CXR: Lines and tubes in appropriate position  +Airspace disease  Nutrition:        Diet Orders   (From admission, onward)             Start     Ordered    10/08/22 0521  Diet NPO  Diet effective 0500        References:    Nutrtion Support Algorithm Enteral vs  Parenteral   Question Answer Comment   Diet Type NPO    RD to adjust diet per protocol? Yes        10/08/22 0520              Physical Exam  Vitals reviewed  Constitutional:       General: He is awake  He is not in acute distress  Appearance: He is well-developed and well-groomed  He is not ill-appearing  Interventions: Nasal cannula in place  Neck:      Comments: RIJ CVC and PA catheter  Cardiovascular:      Rate and Rhythm: Normal rate and regular rhythm  Pulses:           Dorsalis pedis pulses are 2+ on the right side and 2+ on the left side          Posterior tibial pulses are 2+ on the right side and 2+ on the left side  Heart sounds: No murmur heard  No friction rub  Pulmonary:      Effort: Pulmonary effort is normal       Breath sounds: Normal breath sounds  No wheezing, rhonchi or rales  Genitourinary:     Comments: +Dav  Skin:     General: Skin is warm and dry  Neurological:      General: No focal deficit present  Mental Status: He is alert and oriented to person, place, and time  Mental status is at baseline  Invasive lines and devices: Invasive Devices  Report    Central Venous Catheter Line  Duration           CVC Central Lines 10/07/22 Triple 1 day    Introducer 10/07/22 1 day          Peripheral Intravenous Line  Duration           Peripheral IV 10/07/22 Left Antecubital 1 day    Peripheral IV 10/07/22 Left Hand 1 day    Peripheral IV 10/07/22 Right Forearm 1 day          Arterial Line  Duration           Arterial Line 10/07/22 Radial 1 day          Line  Duration           Arterial Sheath 8 Fr  Right Femoral 1 day    Pacer Wires 1 day    Pacer Wires 1 day          Drain  Duration           Chest Tube 1 Posterior;Mediastinal 32 Fr  1 day    Chest Tube 2 Anterior;Mediastinal 32 Fr  1 day    Urethral Catheter Temperature probe 16 Fr  1 day              ---------------------------------------------------------------------------------------------------------------------------------------------------------------------  Code Status: Level 1 - Full Code    Care Time Delivered:   No Critical Care time spent     Collaborative bedside rounds performed with cardiac surgery attending, critical care attending and bedside RN      SIGNATURE: Tawana Diego PA-C  DATE: October 9, 2022  TIME: 5:56 AM

## 2022-10-09 NOTE — PROCEDURES
Procedure: Epicardial Wire Removal    10/09/22    Patient was returned to bed  EPW x 2 d/c'd in typical fashion  No immediate complications  Site dressed with dry sterile dressing  Patient and nurse aware of mandatory 1 hour bedrest protocol  Vital signs ordered Q 15 minutes for one hour as per protocol      Anastasia Hearn

## 2022-10-09 NOTE — PROCEDURES
Procedure: Intra Aortic Balloon Pump removal     Kaye Rahman was informed of mandatory six hour post-procedure bed rest   IABP was removed from RIGHT groin in routine fashion with initial back bleed  Manual pressure was held for 30 minutes without incident  Distal NV status was intact post procedure  Vital signs stable  The patient tolerated the procedure well  Vital signs q 15 minutes for one hour and distal NV checks q hour for 6 hours as per protocol  The assigned nurse was notified  Site dressed with a pressure dressing       [facility]  DATE: October 9, 2022  TIME: 9:47 AM

## 2022-10-09 NOTE — PHYSICAL THERAPY NOTE
10/09/22 0805   Note Type   Note type Evaluation; Cancelled Session   Cancel Reasons Medical status   Eval deferred- pt now extubated but is on bedrest due to IABP  Will follow as able when appropriate    Triston Huggins PT, DPT CSRS

## 2022-10-10 LAB
ANION GAP SERPL CALCULATED.3IONS-SCNC: 5 MMOL/L (ref 4–13)
BACTERIA SPT RESP CULT: NO GROWTH
BASE EX.OXY STD BLDV CALC-SCNC: 67.7 % (ref 60–80)
BASE EXCESS BLDV CALC-SCNC: 6 MMOL/L
BUN SERPL-MCNC: 36 MG/DL (ref 5–25)
CALCIUM SERPL-MCNC: 8.6 MG/DL (ref 8.3–10.1)
CHLORIDE SERPL-SCNC: 108 MMOL/L (ref 96–108)
CO2 SERPL-SCNC: 29 MMOL/L (ref 21–32)
CREAT SERPL-MCNC: 1.02 MG/DL (ref 0.6–1.3)
ERYTHROCYTE [DISTWIDTH] IN BLOOD BY AUTOMATED COUNT: 13 % (ref 11.6–15.1)
GFR SERPL CREATININE-BSD FRML MDRD: 77 ML/MIN/1.73SQ M
GLUCOSE SERPL-MCNC: 100 MG/DL (ref 65–140)
GLUCOSE SERPL-MCNC: 105 MG/DL (ref 65–140)
GLUCOSE SERPL-MCNC: 108 MG/DL (ref 65–140)
GLUCOSE SERPL-MCNC: 125 MG/DL (ref 65–140)
GLUCOSE SERPL-MCNC: 98 MG/DL (ref 65–140)
GRAM STN SPEC: NORMAL
GRAM STN SPEC: NORMAL
HCO3 BLDV-SCNC: 30.6 MMOL/L (ref 24–30)
HCT VFR BLD AUTO: 31.5 % (ref 36.5–49.3)
HGB BLD-MCNC: 10.3 G/DL (ref 12–17)
MAGNESIUM SERPL-MCNC: 2.6 MG/DL (ref 1.6–2.6)
MCH RBC QN AUTO: 29.9 PG (ref 26.8–34.3)
MCHC RBC AUTO-ENTMCNC: 32.7 G/DL (ref 31.4–37.4)
MCV RBC AUTO: 91 FL (ref 82–98)
NASAL CANNULA: 2
O2 CT BLDV-SCNC: 14.9 ML/DL
PCO2 BLDV: 43.7 MM HG (ref 42–50)
PH BLDV: 7.46 [PH] (ref 7.3–7.4)
PLATELET # BLD AUTO: 125 THOUSANDS/UL (ref 149–390)
PMV BLD AUTO: 11.8 FL (ref 8.9–12.7)
PO2 BLDV: 34 MM HG (ref 35–45)
POTASSIUM SERPL-SCNC: 3.8 MMOL/L (ref 3.5–5.3)
RBC # BLD AUTO: 3.45 MILLION/UL (ref 3.88–5.62)
SODIUM SERPL-SCNC: 142 MMOL/L (ref 135–147)
WBC # BLD AUTO: 11.17 THOUSAND/UL (ref 4.31–10.16)

## 2022-10-10 PROCEDURE — 85027 COMPLETE CBC AUTOMATED: CPT | Performed by: PHYSICIAN ASSISTANT

## 2022-10-10 PROCEDURE — 99232 SBSQ HOSP IP/OBS MODERATE 35: CPT | Performed by: INTERNAL MEDICINE

## 2022-10-10 PROCEDURE — 99024 POSTOP FOLLOW-UP VISIT: CPT | Performed by: THORACIC SURGERY (CARDIOTHORACIC VASCULAR SURGERY)

## 2022-10-10 PROCEDURE — 82805 BLOOD GASES W/O2 SATURATION: CPT | Performed by: PHYSICIAN ASSISTANT

## 2022-10-10 PROCEDURE — 97163 PT EVAL HIGH COMPLEX 45 MIN: CPT

## 2022-10-10 PROCEDURE — 83735 ASSAY OF MAGNESIUM: CPT | Performed by: PHYSICIAN ASSISTANT

## 2022-10-10 PROCEDURE — 82948 REAGENT STRIP/BLOOD GLUCOSE: CPT

## 2022-10-10 PROCEDURE — 97535 SELF CARE MNGMENT TRAINING: CPT

## 2022-10-10 PROCEDURE — 97110 THERAPEUTIC EXERCISES: CPT

## 2022-10-10 PROCEDURE — 97166 OT EVAL MOD COMPLEX 45 MIN: CPT

## 2022-10-10 PROCEDURE — 80048 BASIC METABOLIC PNL TOTAL CA: CPT | Performed by: PHYSICIAN ASSISTANT

## 2022-10-10 PROCEDURE — NC001 PR NO CHARGE: Performed by: PHYSICIAN ASSISTANT

## 2022-10-10 RX ORDER — POTASSIUM CHLORIDE 14.9 MG/ML
20 INJECTION INTRAVENOUS ONCE
Status: COMPLETED | OUTPATIENT
Start: 2022-10-10 | End: 2022-10-10

## 2022-10-10 RX ORDER — FONDAPARINUX SODIUM 2.5 MG/.5ML
2.5 INJECTION SUBCUTANEOUS EVERY 24 HOURS
Status: DISCONTINUED | OUTPATIENT
Start: 2022-10-10 | End: 2022-10-11 | Stop reason: HOSPADM

## 2022-10-10 RX ORDER — TEMAZEPAM 15 MG/1
15 CAPSULE ORAL
Status: DISCONTINUED | OUTPATIENT
Start: 2022-10-10 | End: 2022-10-11 | Stop reason: HOSPADM

## 2022-10-10 RX ADMIN — AMIODARONE HYDROCHLORIDE 200 MG: 200 TABLET ORAL at 05:24

## 2022-10-10 RX ADMIN — FUROSEMIDE 40 MG: 10 INJECTION, SOLUTION INTRAMUSCULAR; INTRAVENOUS at 08:31

## 2022-10-10 RX ADMIN — AMIODARONE HYDROCHLORIDE 200 MG: 200 TABLET ORAL at 21:38

## 2022-10-10 RX ADMIN — MUPIROCIN 1 APPLICATION: 20 OINTMENT TOPICAL at 21:39

## 2022-10-10 RX ADMIN — ACETAMINOPHEN 975 MG: 325 TABLET, FILM COATED ORAL at 13:38

## 2022-10-10 RX ADMIN — MUPIROCIN 1 APPLICATION: 20 OINTMENT TOPICAL at 08:31

## 2022-10-10 RX ADMIN — ASPIRIN 325 MG ORAL TABLET 325 MG: 325 PILL ORAL at 08:30

## 2022-10-10 RX ADMIN — POTASSIUM CHLORIDE 20 MEQ: 1500 TABLET, EXTENDED RELEASE ORAL at 17:08

## 2022-10-10 RX ADMIN — AMIODARONE HYDROCHLORIDE 200 MG: 200 TABLET ORAL at 13:38

## 2022-10-10 RX ADMIN — OXYCODONE HYDROCHLORIDE 5 MG: 5 TABLET ORAL at 05:24

## 2022-10-10 RX ADMIN — CEFEPIME 2000 MG: 2 INJECTION, POWDER, FOR SOLUTION INTRAVENOUS at 02:11

## 2022-10-10 RX ADMIN — FONDAPARINUX SODIUM 2.5 MG: 2.5 INJECTION, SOLUTION SUBCUTANEOUS at 08:31

## 2022-10-10 RX ADMIN — HEPARIN SODIUM 5000 UNITS: 5000 INJECTION INTRAVENOUS; SUBCUTANEOUS at 05:24

## 2022-10-10 RX ADMIN — PANTOPRAZOLE SODIUM 40 MG: 40 TABLET, DELAYED RELEASE ORAL at 08:29

## 2022-10-10 RX ADMIN — METOPROLOL TARTRATE 25 MG: 25 TABLET, FILM COATED ORAL at 08:30

## 2022-10-10 RX ADMIN — POTASSIUM CHLORIDE 20 MEQ: 1500 TABLET, EXTENDED RELEASE ORAL at 08:30

## 2022-10-10 RX ADMIN — ATORVASTATIN CALCIUM 40 MG: 40 TABLET, FILM COATED ORAL at 17:08

## 2022-10-10 RX ADMIN — POTASSIUM CHLORIDE 20 MEQ: 14.9 INJECTION, SOLUTION INTRAVENOUS at 06:24

## 2022-10-10 RX ADMIN — ACETAMINOPHEN 975 MG: 325 TABLET, FILM COATED ORAL at 21:36

## 2022-10-10 RX ADMIN — METOPROLOL TARTRATE 25 MG: 25 TABLET, FILM COATED ORAL at 21:38

## 2022-10-10 RX ADMIN — ACETAMINOPHEN 975 MG: 325 TABLET, FILM COATED ORAL at 05:24

## 2022-10-10 RX ADMIN — FUROSEMIDE 40 MG: 10 INJECTION, SOLUTION INTRAMUSCULAR; INTRAVENOUS at 17:08

## 2022-10-10 NOTE — PROCEDURES
Procedure: Chest Tube Removal    10/10/22    Mediastinal CT x 2 d/c'd in typical fashion  Patient tolerated procedure well  No immediate complications  Site dressed with Acticoat dressing   Patient's nurse was made aware of removal      Tiago Alonso

## 2022-10-10 NOTE — PLAN OF CARE
Problem: PHYSICAL THERAPY ADULT  Goal: Performs mobility at highest level of function for planned discharge setting  See evaluation for individualized goals  Description: Treatment/Interventions: Functional transfer training, LE strengthening/ROM, Elevations, Therapeutic exercise, Endurance training, Bed mobility, Gait training, Spoke to nursing, OT          See flowsheet documentation for full assessment, interventions and recommendations  Note: Prognosis: Good  Problem List: Decreased strength, Impaired balance, Decreased mobility, Decreased endurance  Assessment: Pt is 60 y o  male admitted with Dx of acute mitral regurgitation from chordal rupture and cardiogenic shock and underwent Complex mitral valve repair with complex P2 resection, chordal transfer, rotational plasty, and 34 mm Ortiz Physio II ring annuloplasty on 10/7/2022  Pt 's comorbidities affecting POC include: ASD (atrial septal defect), CON, and PNA and personal factors of: steps in the house (basement only) and being active prior to admission  Pt's clinical presentation is currently unstable/unpredictable which is evident in ongoing telem monitoring and abn lab values  Pt presents w/ min overall weakness, decreased endurance and inconsistent amb balance and gait patterns w/ (S) required w/ all aspects of mobility at this time  Will cont to follow pt in PT for progressive mobilization to max level of (I), endurance, and safety  Otherwise, anticipate pt will return home w/ available family support upon D/C when medically cleared; will follow  PT Discharge Recommendation: No rehabilitation needs (OP cardiac rehab when medically cleared)    See flowsheet documentation for full assessment

## 2022-10-10 NOTE — PHYSICAL THERAPY NOTE
Physical Therapy Evaluation     Patient's Name: Diamond Lee    Admitting Diagnosis  No admission diagnoses are documented for this encounter  Problem List  Patient Active Problem List   Diagnosis    Severe sepsis (HCC)    Multifocal pneumonia    High anion gap metabolic acidosis    Elevated troponin    Elevated d-dimer    CON (acute kidney injury) ([facility] [address] )    Severe mitral regurgitation    Acute mitral valve rupture (HCC)    Cardiogenic shock (HCC)    Transaminitis    History of repair of congenital atrial septal defect (ASD)       Past Medical History  Past Medical History:   Diagnosis Date    ASD (atrial septal defect)     s/p repair    Hyperlipidemia     Severe mitral regurgitation     s/p repair       Past Surgical History  Past Surgical History:   Procedure Laterality Date    ATRIAL SEPTECTOMY  10/7/2022    Procedure: REPAIR ATRIAL SEPTAL DEFECT (ASD); Surgeon: Sandy Castillo MD;  [facility];  Service: Cardiac Surgery    CARDIAC CATHETERIZATION N/A 10/7/2022    Procedure: CARDIAC CATHETERIZATION;  Surgeon: Barbara Stallworth DO;  [facility]; Service: Cardiology    CARDIAC CATHETERIZATION N/A 10/7/2022    Procedure: Cardiac Coronary Angiogram;  Surgeon: Barbara Stallworth DO;  [facility]; Service: Cardiology    CARDIAC CATHETERIZATION N/A 10/7/2022    Procedure: Cardiac iabp; Surgeon: Barbara Stallworth DO;  [facility];   Service: Cardiology    INGUINAL HERNIA REPAIR Left     unsure mesh placement    AK REPLACEMENT OF MITRAL VALVE N/A 10/7/2022    Procedure: MITRAL VALVE REPAIR WITH 34MM PHYSIO II ANNULOPLASTY RING;  Surgeon: Sandy Castillo MD;  [facility];  Service: Cardiac Surgery        10/10/22 0851   PT Last Visit   PT Visit Date 10/10/22   Note Type   Note type Evaluation  (and Tx)   Pain Assessment   Pain Assessment Tool 0-10   Pain Score No Pain   Restrictions/Precautions   Braces or Orthoses   (denies) Other Precautions Cardiac/sternal;Telemetry   Home Living   Type of [address] One level  (uses basement area as well; flight of steps w/ hand rail; no CESAR)   Prior Function   Level of [place] Independent with ADLs  (amb w/o AD)   Lives With Spouse   Vocational Retired   Comments very active   General   Additional Pertinent History cleared for assessment (spoke to nsg)   Cognition   Overall Cognitive Status WFL   Arousal/Participation Alert   Attention Within functional limits   Orientation Level Oriented to person;Oriented to place;Oriented to situation   Memory Within functional limits   Following Commands Follows one step commands without difficulty   Subjective   Subjective Alert; in the chair; agreeable to moilize   RUE Assessment   RUE Assessment WFL  (AROM)   LUE Assessment   LUE Assessment WFL  (AROM)   RLE Assessment   RLE Assessment WFL  (AROM)   Strength RLE   RLE Overall Strength   (good -/ good (grossly))   LLE Assessment   LLE Assessment WFL  (AROM)   Strength LLE   LLE Overall Strength   (good -/ good (grossly))   Transfers   Sit to Stand 5  Supervision   Stand to Sit 5  Supervision   Ambulation/Elevation   Gait pattern Short stride; Inconsistent rebecca   Gait Assistance 5  Supervision   Additional items Verbal cues   Assistive Device Rolling walker   Distance 240 ft   Balance   Static Sitting Fair +   Dynamic Sitting Fair   Static Standing Fair   Dynamic Standing Fair -   Ambulatory Fair -   Activity Tolerance   Activity Tolerance Patient tolerated treatment well   Medical Staff Made Aware Co-eval performed w/ OTR due to complexity of medical status   Nurse Made Aware spoke to [facility]   Assessment   Prognosis Good   Problem List Decreased strength; Impaired balance;Decreased mobility; Decreased endurance   Assessment Pt is 60 y o  male admitted with Dx of acute mitral regurgitation from chordal rupture and cardiogenic shock and underwent Complex mitral valve repair with complex P2 resection, chordal transfer, rotational plasty, and 34 mm Ortiz Physio II ring annuloplasty on 10/7/2022  Pt 's comorbidities affecting POC include: ASD (atrial septal defect), OCN, and PNA and personal factors of: steps in the house (basement only) and being active prior to admission  Pt's clinical presentation is currently unstable/unpredictable which is evident in ongoing telem monitoring and abn lab values  Pt presents w/ min overall weakness, decreased endurance and inconsistent amb balance and gait patterns w/ (S) required w/ all aspects of mobility at this time  Will cont to follow pt in PT for progressive mobilization to max level of (I), endurance, and safety  Otherwise, anticipate pt will return home w/ available family support upon D/C when medically cleared; will follow  Goals   Patient Goals to go home   STG Expiration Date 10/20/22   Short Term Goal #1 7-10 days  Pt will amb 400 ft w/o assistive device, (I) in order to facilitate safe return to premorbid environment and community amb status  Pt will negotiate 12 steps w/ hand rail, mod (I) in order to navigate between levels of home environment safely  Pt will achieve (I) level w/ bed mob in order to facilitate safety with OOB and back to bed transitions in own living environment  Pt will perform transfers w/ mod (I) to assure (I) and safety w/ transitions during aspects of mobility/locomotion  Pt will participate in LE therex and balance activities to max progression w/ mobility skills  PT Treatment Day 0   Plan   Treatment/Interventions Functional transfer training;LE strengthening/ROM; Elevations; Therapeutic exercise; Endurance training;Bed mobility;Gait training;Spoke to nursing;OT   PT Frequency 4-6x/wk   Recommendation   PT Discharge Recommendation No rehabilitation needs  (OP cardiac rehab when medically cleared)   AM-PAC Basic Mobility Inpatient   Turning in Bed Without Bedrails 4   Lying on Back to Sitting on Edge of Flat Bed 3 Moving Bed to Chair 3   Standing Up From Chair 3   Walk in Room 3   Climb 3-5 Stairs 3   Basic Mobility Inpatient Raw Score 19   Basic Mobility Standardized Score 42 48   Highest Level Of Mobility   -[facility] Goal 6: Walk 10 steps or more   -HLM Achieved 8: Walk 250 feet ot more   Modified Eddy Scale   Modified Eddy Scale 3   Additional Treatment Session   Start Time 2853   End Time 0901   Treatment Assessment Additional follow up consecutive session performed to initiate LE therex in order to max strength and to facilitate progression w/ functional mobility skills and overall level of (I)  Additional Treatment Day 1   Exercises   Knee AROM Long Arc Quad Sitting;10 reps;AROM; Bilateral  (2 sets)   Ankle Pumps Sitting;10 reps;AROM; Bilateral  (2 sets)   Marching Sitting;5 reps;AROM; Bilateral  (2 sets)   End of Consult   Patient Position at End of Consult Bedside chair; All needs within reach           [facility]

## 2022-10-10 NOTE — PLAN OF CARE
Problem: Prexisting or High Potential for Compromised Skin Integrity  Goal: Skin integrity is maintained or improved  Description: INTERVENTIONS:  - Identify patients at risk for skin breakdown  - Assess and monitor skin integrity  - Assess and monitor nutrition and hydration status  - Monitor labs   - Assess for incontinence   - Turn and reposition patient  - Assist with mobility/ambulation  - Relieve pressure over bony prominences  - Avoid friction and shearing  - Provide appropriate hygiene as needed including keeping skin clean and dry  - Evaluate need for skin moisturizer/barrier cream  - Collaborate with interdisciplinary team   - Patient/family teaching  - Consider wound care consult   Outcome: Progressing     Problem: Nutrition/Hydration-ADULT  Goal: Nutrient/Hydration intake appropriate for improving, restoring or maintaining nutritional needs  Description: Monitor and assess patient's nutrition/hydration status for malnutrition  Collaborate with interdisciplinary team and initiate plan and interventions as ordered  Monitor patient's weight and dietary intake as ordered or per policy  Utilize nutrition screening tool and intervene as necessary  Determine patient's food preferences and provide high-protein, high-caloric foods as appropriate       INTERVENTIONS:  - Monitor oral intake, urinary output, labs, and treatment plans  - Assess nutrition and hydration status and recommend course of action  - Evaluate amount of meals eaten  - Assist patient with eating if necessary   - Allow adequate time for meals  - Recommend/ encourage appropriate diets, oral nutritional supplements, and vitamin/mineral supplements  - Order, calculate, and assess calorie counts as needed  - Recommend, monitor, and adjust tube feedings and TPN/PPN based on assessed needs  - Assess need for intravenous fluids  - Provide specific nutrition/hydration education as appropriate  - Include patient/family/caregiver in decisions related to nutrition  Outcome: Progressing     Problem: COPING  Goal: Pt/Family able to verbalize concerns and demonstrate effective coping strategies  Description: INTERVENTIONS:  - Assist patient/family to identify coping skills, available support systems and cultural and spiritual values  - Provide emotional support, including active listening and acknowledgement of concerns of patient and caregivers  - Reduce environmental stimuli, as able  - Provide patient education  - Assess for spiritual pain/suffering and initiate spiritual care, including notification of Pastoral Care or amor based community as needed  - Assess effectiveness of coping strategies  Outcome: Progressing  Goal: Will report anxiety at manageable levels  Description: INTERVENTIONS:  - Administer medication as ordered  - Teach and encourage coping skills  - Provide emotional support  - Assess patient/family for anxiety and ability to cope  Outcome: Progressing     Problem: MOBILITY - ADULT  Goal: Maintain or return to baseline ADL function  Description: INTERVENTIONS:  -  Assess patient's ability to carry out ADLs; assess patient's baseline for ADL function and identify physical deficits which impact ability to perform ADLs (bathing, care of mouth/teeth, toileting, grooming, dressing, etc )  - Assess/evaluate cause of self-care deficits   - Assess range of motion  - Assess patient's mobility; develop plan if impaired  - Assess patient's need for assistive devices and provide as appropriate  - Encourage maximum independence but intervene and supervise when necessary  - Involve family in performance of ADLs  - Assess for home care needs following discharge   - Consider OT consult to assist with ADL evaluation and planning for discharge  - Provide patient education as appropriate  Outcome: Progressing  Goal: Maintains/Returns to pre admission functional level  Description: INTERVENTIONS:  - Perform BMAT or MOVE assessment daily    - Set and communicate daily mobility goal to care team and patient/family/caregiver  - Collaborate with rehabilitation services on mobility goals if consulted  - Perform Range of Motion 3 times a day  - Reposition patient every 2 hours    - Dangle patient 3 times a day  - Stand patient 3 times a day  - Ambulate patient 3 times a day  - Out of bed to chair 3 times a day   - Out of bed for meals 3 times a day  - Out of bed for toileting  - Record patient progress and toleration of activity level   Outcome: Progressing     Problem: Potential for Falls  Goal: Patient will remain free of falls  Description: INTERVENTIONS:  - Educate patient/family on patient safety including physical limitations  - Instruct patient to call for assistance with activity   - Consult OT/PT to assist with strengthening/mobility   - Keep Call bell within reach  - Keep bed low and locked with side rails adjusted as appropriate  - Keep care items and personal belongings within reach  - Initiate and maintain comfort rounds  - Make Fall Risk Sign visible to staff  - Offer Toileting every 2 Hours, in advance of need  - Initiate/Maintain bed alarm  - Apply yellow socks and bracelet for high fall risk patients  - Consider moving patient to room near nurses station  Outcome: Progressing     Problem: CARDIOVASCULAR - ADULT  Goal: Maintains optimal cardiac output and hemodynamic stability  Description: INTERVENTIONS:  - Monitor I/O, vital signs and rhythm  - Monitor for S/S and trends of decreased cardiac output  - Administer and titrate ordered vasoactive medications to optimize hemodynamic stability  - Assess quality of pulses, skin color and temperature  - Assess for signs of decreased coronary artery perfusion  - Instruct patient to report change in severity of symptoms  Outcome: Progressing  Goal: Absence of cardiac dysrhythmias or at baseline rhythm  Description: INTERVENTIONS:  - Continuous cardiac monitoring, vital signs, obtain 12 lead EKG if ordered  - Administer antiarrhythmic and heart rate control medications as ordered  - Monitor electrolytes and administer replacement therapy as ordered  Outcome: Progressing

## 2022-10-10 NOTE — OCCUPATIONAL THERAPY NOTE
Occupational Therapy Evaluation      Kem Calderon    10/10/2022    Principal Problem:    Acute mitral valve rupture (HCC)  Active Problems:    CON (acute kidney injury) ([address] )    Severe mitral regurgitation    Cardiogenic shock (HCC)    Transaminitis    History of repair of congenital atrial septal defect (ASD)      Past Medical History:   Diagnosis Date    ASD (atrial septal defect)     s/p repair    Hyperlipidemia     Severe mitral regurgitation     s/p repair       Past Surgical History:   Procedure Laterality Date    ATRIAL SEPTECTOMY  10/7/2022    Procedure: REPAIR ATRIAL SEPTAL DEFECT (ASD); Surgeon: Giovana Dupont MD;  [facility];  Service: Cardiac Surgery    CARDIAC CATHETERIZATION N/A 10/7/2022    Procedure: CARDIAC CATHETERIZATION;  Surgeon: Maryla Bernheim, DO;  [facility]; Service: Cardiology    CARDIAC CATHETERIZATION N/A 10/7/2022    Procedure: Cardiac Coronary Angiogram;  Surgeon: Maryla Bernheim, DO;  [facility]; Service: Cardiology    CARDIAC CATHETERIZATION N/A 10/7/2022    Procedure: Cardiac iabp; Surgeon: Maryla Bernheim, DO;  [facility];   Service: Cardiology    INGUINAL HERNIA REPAIR Left     unsure mesh placement    CO REPLACEMENT OF MITRAL VALVE N/A 10/7/2022    Procedure: MITRAL VALVE REPAIR WITH 34MM PHYSIO II ANNULOPLASTY RING;  Surgeon: Giovana Dupont MD;  [facility]: [facility];  Service: Cardiac Surgery        10/10/22 1054   OT Last Visit   OT Visit Date 10/10/22   Note Type   Note type Evaluation   Pain Assessment   Pain Assessment Tool 0-10   Pain Score No Pain   Restrictions/Precautions   Weight Bearing Precautions Per Order No   Other Precautions Cardiac/sternal   Home Living   Type of [address] One level;Performs ADLs on one level  (rec room in basement, does not need to use)   Bathroom Shower/Tub Walk-in shower   Bathroom Toilet Raised   Bathroom Accessibility Accessible Additional Comments pt denies use of DME at baseline   he has bsc, RW/w/c available from his parents   Prior Function   Level of [place] Independent with ADLs and functional mobility   Lives With Spouse   Receives Help From Family   IADLs Independent   Falls in the last 6 months 0   Vocational Retired   Comments 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[employer] pt reports being fully indpendent w self care, mobility, driving etc  reports being active at baseline frequently going for runs   Reciprocal Relationships supportive wife   Intrinsic Gratification exercise such as walking/running   Subjective   Subjective "this place saved my life"   ADL   Eating Assistance 7  Independent   Eating Deficit Setup   Grooming Assistance 7  Independent   Grooming Deficit Setup   UB Bathing Assistance 5  Supervision/Setup   UB Bathing Deficit Setup   LB Bathing Assistance 5  Supervision/Setup    [facility] 5  Supervision/Setup   [place]  5  Supervision/Setup   Bed Mobility   Additional Comments pt OOB upon arrival   Transfers   Sit to Stand 6  Modified independent   Stand to Sit 6  Modified independent   Stand pivot 5  Supervision   Functional Mobility   Functional Mobility 5  Supervision   Additional items Rolling walker   Balance   Static Sitting Normal   Dynamic Sitting Normal   Static Standing Good   Dynamic Standing Fair +   Activity Tolerance   Activity Tolerance Patient tolerated treatment well   Medical Staff Made Aware seen with PT Asher due to medical complexity   Nurse Made Aware OK to see per RN   RUE Assessment   RUE Assessment WFL   LUE Assessment   LUE Assessment WFL   Hand Function   Gross Motor Coordination Functional   Fine Motor Coordination Functional   Sensation   Light Touch No apparent deficits   Vision - Complex Assessment   Tracking Able to track stimulus in all quads without difficulty   Acuity Able to read clock/calendar on wall without difficulty   Psychosocial   Psychosocial (WDL) WDL   Perception   Inattention/Neglect Appears intact   Cognition   Overall Cognitive Status WFL   Attention Within functional limits   Orientation Level Oriented X4   Memory Within functional limits   Following Commands Follows all commands and directions without difficulty   Assessment   Assessment Pt is a 60 y o  male seen for OT evaluation s/p admit to [facility] on 10/7/2022 w/ Acute mitral regurgitation from chordal rupture ([address] )  pt in to urgent care day prior w c/o cough and SOB, dx with pna  Pt is now s/p emergent mitral valve repair on 10/7  Most lines/tubes have been dc'd  Pt transferred out of ICU  Comorbidities affecting pt's functional performance at time of assessment include: CON, cardiogeneic shock, hx repair of congenital atrial septal defect, pneumonia, elevated d-dimer  Personal factors affecting pt at time of IE include:difficulty performing IADLS   Prior to admission, pt was living at home w his wife in a one story home  Reports having a rec room in the basement  Pt is typically independent w all self care, home management, driving and is active  Likes to go for 10 miles run  Upon evaluation: Pt requires no assist w self care  He was able to walk to bathroom w S, stood using urinal w no loss of balance  He was able to don and doff socks self  minimal SOB noted with activity  Pt was seen for treatment session w focus on ECT/self pacing and cardiac education  Good understanding noted  Based on findings from OT evaluation and functional performance deficits, pt has been identified as a  moderate complexity evaluation  The patient's raw score on the AM-PAC Daily Activity inpatient short form is 22, standardized score is 47 1, greater than 39 4   Patients at this level are likely to benefit from discharge to home  From OT standpoint, recommendation at time of d/c would be home w family support and out patient cardiac rehab as per protocol  No DME needed from OT standpoint  DC OT at this time  Goals   Patient Goals to get better and go home   Plan   OT Frequency Eval only   Recommendation   OT Discharge Recommendation No rehabilitation needs   Additional Comments  pt will go for outpt cardiac rehab as per protocol   AM-PAC Daily Activity Inpatient   Lower Body Dressing 3   Bathing 3   Toileting 4   Upper Body Dressing 4   Grooming 4   Eating 4   Daily Activity Raw Score 22   Daily Activity Standardized Score (Calc for Raw Score >=11) 47 1   Additional Treatment Session   Start Time 8573  (IE from 4634-7730)   End Time 1054   Treatment Assessment pt seen for education session re recovering from cardiac surgery  reviewd education packet, educated on ECT/self pacing skills and sternal precuations  pt verbalized good understanding  from OT standpoing, ongoing therapy is not indicated while in acute care  DC OT at this time     Additional Treatment Day 1

## 2022-10-10 NOTE — CASE MANAGEMENT
Case Management Assessment & Discharge Planning Note    Patient name Liz Irwin  Location [address] [place] [address] [facility] [id number]  : 1960 Date 10/10/2022       Current Admission Date: 10/7/2022  Current Admission Diagnosis:Acute mitral valve rupture [facility])   Patient Active Problem List    Diagnosis Date Noted   • Severe sepsis ([facility] [address] ) 10/07/2022   • Multifocal pneumonia 10/07/2022   • High anion gap metabolic acidosis    • Elevated troponin 10/07/2022   • Elevated d-dimer 10/07/2022   • CON (acute kidney injury) ([facility] [address] ) 10/07/2022   • Severe mitral regurgitation 10/07/2022   • Acute mitral valve rupture ([facility] [address] ) 10/07/2022   • Cardiogenic shock ([facility] [address] ) 10/07/2022   • Transaminitis 10/07/2022   • History of repair of congenital atrial septal defect (ASD) 10/07/2022      LOS (days): 3  Geometric Mean LOS (GMLOS) (days):   Days to GMLOS:     OBJECTIVE:  PATIENT READMITTED TO HOSPITAL  Risk of Unplanned Readmission Score: 15 9         Current admission status: Inpatient       Preferred Pharmacy:   [address] [address]  [place] [address] [place] [address]  Phone: [phone] Fax: [phone]    Primary Care Provider: Sushila Bean DO    Primary Insurance: BLUE CROSS  Secondary Insurance:     ASSESSMENT:  Abilio Munroe Proxies    There are no active Health Care Proxies on file         Advance Directives  Does patient have a Health Care POA?: Yes  Does patient have Advance Directives?: Yes  Advance Directives: Living will, Power of  for health care  Primary Contact: wife Lisa Kumar         Readmission Root Cause  30 Day Readmission: No (transferred)    Patient Information  Admitted from[de-identified] Home  Mental Status: Alert  During Assessment patient was accompanied by: Not accompanied during assessment  Assessment information provided by[de-identified] Patient  Primary Caregiver: Self  Support Systems: Spouse/significant other  [place] of Residence: [address] [place] [address] do you live in?: [place]  Home entry access options   Select all that apply : No steps to enter home  Type of Current Residence: [place]  In the last 12 months, was there a time when you were not able to pay the mortgage or rent on time?: No  In the last 12 months, how many places have you lived?: 1  In the last 12 months, was there a time when you did not have a steady place to sleep or slept in a shelter (including now)?: No  Homeless/housing insecurity resource given?: N/A  Living Arrangements: Lives w/ Spouse/significant other  Is patient a ?: No    Activities of Daily Living Prior to Admission  Functional Status: Independent  Completes ADLs independently?: Yes  Ambulates independently?: Yes  Does patient use assisted devices?: No  Does patient currently own DME?: Yes  What DME does the patient currently own?: Bedside Commode, Walker, Wheelchair  Does patient have a history of Outpatient Therapy (PT/OT)?: No  Does the patient have a history of Short-Term Rehab?: No  Does patient have a history of HHC?: No  Does patient currently have [facility]?: No         Patient Information Continued  Income Source: Pension/senior living  Does patient have prescription coverage?: Yes  Within the past 12 months, you worried that your food would run out before you got the money to buy more : Never true  Within the past 12 months, the food you bought just didn't last and you didn't have money to get more : Never true  Food insecurity resource given?: N/A  Does patient receive dialysis treatments?: No  Does patient have a history of substance abuse?: No  Does patient have a history of Mental Health Diagnosis?: No         Means of Transportation  Means of Transport to Appts[de-identified] Drives Self        DISCHARGE DETAILS:    Discharge planning discussed with[de-identified] pt-- no preference for [facility], will refer to [place] of Choice: Yes     CM contacted family/caregiver?: No- see comments (alert and oriented) Contacts  Patient Contacts: wife Jair Rivera  Relationship to Patient[de-identified] Family  Contact Method: Phone  Phone Number: [phone]  Reason/Outcome: Continuity of Care, Emergency Contact, Discharge [address]         Is the patient interested in Emmanuel Umaña at discharge?: Yes  [address] requested[de-identified] [facility] Provider[de-identified] PCP  Home Health Services Needed[de-identified] Post-Op Care and Assessment  Homebound Criteria Met[de-identified] Requires the Assistance of Another Person for Safe Ambulation or to Leave the Home  Supporting Clincal Findings[de-identified] Fatigues Easliy in [employer], Limited Endurance         Other Referral/Resources/Interventions Provided:  Interventions: HHC    Would you like to participate in our [address] service program?  : No - Declined    Treatment Team Recommendation: Home with [facility]  Discharge Destination Plan[de-identified] Home with Anal[place] at Discharge : Family                                      Additional Comments: 60yo male emergently admitted with cardiogenic shock, severe MR, had complex MVR  IABP out, chest tubes out, doing well, lives with wife  HHC pending

## 2022-10-10 NOTE — PROGRESS NOTES
Progress Note - Critical Care   Janna Woodard 60 y o  male MRN: [id number]  Unit/Bed#: [facility] 414-01 Encounter: [id number]      Attending Physician: Demetrice Treviño MD    24 Hour Events/HPI: POD # 3 s/p MV repair  Delined  IABP removed  Making good urine  ROS: Review of Systems   Respiratory: Negative for cough, chest tightness and shortness of breath  Cardiovascular: Negative for chest pain and palpitations  Gastrointestinal: Negative for nausea and vomiting  All other systems reviewed and are negative     ---------------------------------------------------------------------------------------------------------------------------------------------------------------------  Impressions:  1  Severe MR s/p complex MV repair  2  Cardiogenic shock  3  Pulm edema  4  ASD   5  CON versus CKD  6   Fevers    Plan:    Neuro:   · Pain controlled with: tylenol ATC and oxycodone PRN  · Regulate sleep/wake cycle  · Delirium precautions  · CAM-ICU daily  · Trend neuro exam    CV:   · Cardiac infusions: None  · MAP goal > 65 and CI >2 2  · Discontinue PA catheter  · Discontinue arterial line  · Rhythm: NSR  · Follow rhythm on telemetry  · Lopressor 25 mg PO BID  · Epicardial pacing wires out  ·  mg QD  · Statin: Lipitor 40mg qHS  · Amiodarone 200 mg PO q8 hours     Lung:   · Good Room air oxygen saturation; Continue incentive spirometry/Coughing/Deep breathing exercises  · Chest tube drainage diminished; D/C today    GI:   · Continue PPI for stress ulcer prophylaxis  · Continue bowel regimen  · Trend abdominal exam and bowel function    FEN:   · Diuretic plan: Lasix 40 mg IV q BID  · K-dur 20 mEQ PO q BID  · Nutrition/diet plan: PO diet  · Replenish electrolytes with goals: K >4 0, Mag >2 0, and Phos >3 0    :   · Indwelling Demarco present: yes   · Discontinue Demarco  · Trend UOP and BUN/creat  · Strict I and O    ID:   · Source of infection: Fevers  · ABX ordered: cefepime  · Day # 4 - stop today  · Trend temps and WBC count  · Maintain normothermia    Results from last 7 days   Lab Units 10/08/22  0411 10/07/22  1424 10/07/22  0742   PROCALCITONIN ng/ml 2 88* 0 62* 0 28*     Heme:   · Trend hgb and plts    Endo:   · Glycemic control plan: Glucose well-controlled  Insulin sliding scale coverage      Results from last 6 Months   Lab Units 10/07/22  1424   HEMOGLOBIN A1C % 5 2     MSK/Skin:  · Mobility goal: OOB  · PT consult: yes  · OT consult: yes  · Frequent turning and pressure off-loading  · Local wound care as needed    Disposition:  Transfer to telemetry  ---------------------------------------------------------------------------------------------------------------------------------------------------------------------  Allergies: No Known Allergies    Medications:     VTE Pharmacologic Prophylaxis: Fondaparinux (Arixtra)  VTE Mechanical Prophylaxis: sequential compression device    Scheduled Meds:   acetaminophen, 975 mg, Q8H  amiodarone, 200 mg, Q8H STONE  aspirin, 325 mg, Daily  atorvastatin, 40 mg, Daily With Dinner  cefepime, 2,000 mg, Q12H  docusate sodium, 100 mg, BID  furosemide, 40 mg, BID (diuretic)  heparin (porcine), 5,000 Units, Q8H STONE  insulin lispro, 1-5 Units, TID AC  insulin lispro, 1-5 Units, HS  metoprolol tartrate, 12 5 mg, Q12H STONE  mupirocin, 1 application, B43B STONE  pantoprazole, 40 mg, Daily  polyethylene glycol, 17 g, Daily  potassium chloride, 20 mEq, BID       Continuous Infusions:     PRN Meds:  bisacodyl, 10 mg, Daily PRN  HYDROmorphone, 0 5 mg, Q2H PRN  lidocaine (cardiac), 100 mg, Q30 Min PRN  ondansetron, 4 mg, Q6H PRN  oxyCODONE, 2 5 mg, Q4H PRN  oxyCODONE, 5 mg, Q4H PRN      ---------------------------------------------------------------------------------------------------------------------------------------------------------------------  Vitals:   Vitals:    10/10/22 0200 10/10/22 0400 10/10/22 0538 10/10/22 0600   BP: 99/57 117/76  129/74   BP Location:  Left arm     Pulse: 65 68 74   Resp: 18 13  17   Temp: 99 32 °F (37 4 °C) 99 68 °F (37 6 °C)  (!) 100 58 °F (38 1 °C)   TempSrc:  Bladder     SpO2: 95% 94%  93%   Weight:   87 1 kg (192 lb 0 3 oz)      Arterial Line:  Arterial Line BP: 131/70  Arterial Line MAP (mmHg): 88 mmHg    Tele Rhythm: NSR (This was personally reviewed by myself )    Respiratory:  SpO2: SpO2: 93 %  SpO2 Device: O2 Device: Nasal cannula  Nasal Cannula O2 Flow Rate (L/min): 2 L/min    Ventilator:  Respiratory  Report   Lab Data (Last 4 hours)    None         O2/Vent Data (Last 4 hours)    None              Temperature: Temp (24hrs), Av 2 °F (37 3 °C), Min:98 6 °F (37 °C), Max:100 58 °F (38 1 °C)  Current: Temperature: (!) 100 58 °F (38 1 °C)    Weights:   Weight (last 2 days)     Date/Time Weight    10/10/22 0538 87 1 (192 02)    10/09/22 0543 89 (196 21)    10/08/22 0537 91 2 (201 06)        Body mass index is 25 33 kg/m²  Hemodynamic Monitoring:  PAP: PAP: 32/14  CVP: CVP (mean): 14 mmHg  CO: CO (L/min): 7 1 L/min  CI: CI (L/min/m2): 3 4 L/min/m2  SVR: SVR (dyne*sec)/cm5: 913 (dyne*sec)/cm5    Intake and Outputs:    Intake/Output Summary (Last 24 hours) at 10/10/2022 0604  Last data filed at 10/10/2022 0600  Gross per 24 hour   Intake 1963 46 ml   Output 2970 ml   Net -1006 54 ml     I/O last 24 hours: In: 2431 8 [P O :1720; I V :561 8; IV Piggyback:150]  Out: 3900 [Urine:3740;  Chest Tube:160]    UOP: 50-60/hour   BM: 1 in the last 24 hours    Chest tube Output:  Mediastinal tubes: 30 mL/8 hours  110 mL/24 hours   Pleural tubes: --- mL/8 hours  --- mL/24 hours     Labs:  Results from last 7 days   Lab Units 10/10/22  0434 10/09/22  0405 10/08/22  0406 10/07/22  1423 10/07/22  0742   WBC Thousand/uL 11 17* 14 78* 19 15*  --  22 68*   HEMOGLOBIN g/dL 10 3* 11 3* 13 7   < > 17 5*   I STAT HEMOGLOBIN   --   --   --    < >  --    HEMATOCRIT % 31 5* 34 0* 39 3   < > 49 7*   HEMATOCRIT, ISTAT   --   --   --    < >  --    PLATELETS Thousands/uL 125* 109* 199   < > 322   NEUTROS PCT %  --   --   --   --  86*   MONOS PCT %  --   --   --   --  6    < > = values in this interval not displayed  Results from last 7 days   Lab Units 10/10/22  0434 10/09/22  0405 10/08/22  0406 10/07/22  2358 10/07/22  2104 10/07/22  2102 10/07/22  2000 10/07/22  1935 10/07/22  1423 10/07/22  0742   SODIUM mmol/L 142 141 142  --   --    < >  --   --   --  135   POTASSIUM mmol/L 3 8 3 9 4 3   < >  --    < >  --   --   --  4 8   CHLORIDE mmol/L 108 109* 112*  --   --    < >  --   --   --  100   CO2 mmol/L 29 26 24  --   --    < >  --   --   --  21   CO2, I-STAT mmol/L  --   --   --   --  23  --  24 26   < >  --    BUN mg/dL 36* 36* 28*  --   --    < >  --   --   --  25   CREATININE mg/dL 1 02 1 11 1 52*  --   --    < >  --   --   --  1 34*   CALCIUM mg/dL 8 6 8 1* 8 2*  --   --    < >  --   --   --  9 4   ALK PHOS U/L  --   --   --   --   --   --   --   --   --  94   ALT U/L  --   --   --   --   --   --   --   --   --  21   AST U/L  --   --   --   --   --   --   --   --   --  20   GLUCOSE, ISTAT mg/dl  --   --   --   --  164*  --  180* 174*   < >  --     < > = values in this interval not displayed  Baseline Creat: ?    Results from last 7 days   Lab Units 10/10/22  0434 10/09/22  0405 10/08/22  0406   MAGNESIUM mg/dL 2 6 2 6 3 0*     Results from last 7 days   Lab Units 10/07/22  0742   PHOSPHORUS mg/dL 4 1      Results from last 7 days   Lab Units 10/07/22  2102 10/07/22  1424 10/07/22  0742   INR  1 36* 1 01 0 90   PTT seconds 48*  --  26     Results from last 7 days   Lab Units 10/07/22  1045 10/07/22  0742   LACTIC ACID mmol/L 4 1* 3 7*     Results from last 7 days   Lab Units 10/08/22  0406   PH ART  7 458*   PCO2 ART mm Hg 35 2*   PO2 ART mm Hg 145 9*   HCO3 ART mmol/L 24 4   BASE EXC ART mmol/L 1 0   ABG SOURCE  Line, Arterial     SVO2: 67%    Micro:   Blood Culture:   Lab Results   Component Value Date    BLOODCX No Growth at 24 hrs  10/08/2022    BLOODCX No Growth at 24 hrs   10/08/2022 BLOODCX No Growth at 48 hrs  10/07/2022    BLOODCX No Growth at 48 hrs  10/07/2022     Urine Culture: No results found for: URINECX  Sputum Culture: No components found for: SPUTUMCX  Wound Culure: No results found for: WOUNDCULT    Diagnositic Studies:  None    Nutrition:        Diet Orders   (From admission, onward)             Start     Ordered    10/09/22 0603  Diet Cardiovascular; Cardiac; Fluid Restriction 1800 ML  Diet effective 0500        References:    Nutrtion Support Algorithm Enteral vs  Parenteral   Question Answer Comment   Diet Type Cardiovascular    Cardiac Cardiac    Other Restriction(s): Fluid Restriction 1800 ML    RD to adjust diet per protocol? Yes        10/09/22 0602              Physical Exam  Vitals reviewed  Constitutional:       General: He is awake  Interventions: Nasal cannula in place  Neck:      Comments: RIJ CVC  Cardiovascular:      Rate and Rhythm: Normal rate and regular rhythm  Heart sounds: No murmur heard  No friction rub  Pulmonary:      Effort: Pulmonary effort is normal       Breath sounds: Normal breath sounds  No wheezing, rhonchi or rales  Genitourinary:     Comments: +Demarco  Skin:     General: Skin is warm and dry  Neurological:      General: No focal deficit present  Mental Status: He is alert and oriented to person, place, and time  Mental status is at baseline  Invasive lines and devices:   Invasive Devices  Report    Central Venous Catheter Line  Duration           CVC Central Lines 10/07/22 Triple 2 days          Peripheral Intravenous Line  Duration           Peripheral IV 10/07/22 Left Antecubital 2 days    Peripheral IV 10/07/22 Left Hand 2 days    Peripheral IV 10/07/22 Right Forearm 2 days          Drain  Duration           Chest Tube 1 Posterior;Mediastinal 32 Fr  2 days    Chest Tube 2 Anterior;Mediastinal 32 Fr  2 days    Urethral Catheter Temperature probe 16 Fr  2 days ---------------------------------------------------------------------------------------------------------------------------------------------------------------------  Code Status: Level 1 - Full Code    Care Time Delivered:   No Critical Care time spent     Collaborative bedside rounds performed with cardiac surgery attending, critical care attending and bedside RN      SIGNATURE: Romy Willett PA-C  DATE: October 10, 2022  TIME: 6:04 AM

## 2022-10-10 NOTE — PROGRESS NOTES
Progress Note - Cardiothoracic Surgery   Kaye Rahman 60 y o  male MRN: [id number]  Unit/Bed#: [facility] 414-01 Encounter: [id number]      POD # 3 s/p Emergent Mitral Valve Repair for apparent acute severe mitral regurgitation with cardiogenic shock  Pt seen/examined  Interval history and data reviewed with critical care team     Continued clinical improvement      IABP out      Medications:   Scheduled Meds:  Current Facility-Administered Medications   Medication Dose Route Frequency Provider Last Rate   • acetaminophen  975 mg Oral Q8H Letitia Cheek PA-C     • amiodarone  200 mg Oral Q8H [facility] Letitia Cheek PA-C     • aspirin  325 mg Oral Daily Letitia Cheek PA-C     • atorvastatin  40 mg Oral Daily With GAYLA Solares     • bisacodyl  10 mg Rectal Daily PRN Letitia Cheek PA-C     • docusate sodium  100 mg Oral BID Letitia Cheek PA-C     • fondaparinux  2 5 mg Subcutaneous Q24H Megan Quinteros PA-C     • furosemide  40 mg Intravenous BID (diuretic) Megan Quinteros PA-C     • insulin lispro  1-5 Units Subcutaneous TID AC Megan Quinteros PA-C     • insulin lispro  1-5 Units Subcutaneous HS Megan Quinteros PA-C     • lidocaine (cardiac)  100 mg Intravenous Q30 Min PRN Letitia Cheek PA-C     • metoprolol tartrate  25 mg Oral Q12H [facility] Megan Quinteros PA-C     • mupirocin  1 application Nasal M73E [facility] Letitia Cheek PA-C     • ondansetron  4 mg Intravenous Q6H PRN Letitia Cheek PA-C     • oxyCODONE  2 5 mg Oral Q4H PRN Letitia Cheek PA-C     • oxyCODONE  5 mg Oral Q4H PRN Letitia Cheek PA-C     • pantoprazole  40 mg Oral Daily Letitia Cheek PA-C     • polyethylene glycol  17 g Oral Daily Letitia Cheek PA-C     • potassium chloride  20 mEq Oral BID Megan Quinteros PA-C     • potassium chloride  20 mEq Intravenous Once Megan Quinteros PA-C 20 mEq (10/10/22 3471)   • temazepam  15 mg Oral HS PRN Megan Quinteros PA-C       Continuous Infusions:   PRN Meds: •  bisacodyl  •  lidocaine (cardiac)  • ondansetron  •  oxyCODONE  •  oxyCODONE  •  temazepam    Vitals: Blood pressure 129/74, pulse 71, temperature (!) 100 58 °F (38 1 °C), resp  rate 17, weight 87 1 kg (192 lb 0 3 oz), SpO2 92 %  ,Body mass index is 25 33 kg/m²  I/O last 24 hours: In: 2083 5 [P O :1720; I V :163 5; IV Piggyback:200]  Out: 2970 [Urine:2860; Chest Tube:110]  Invasive Devices  Report    Central Venous Catheter Line  Duration           CVC Central Lines 10/07/22 Triple 2 days          Peripheral Intravenous Line  Duration           Peripheral IV 10/07/22 Left Antecubital 2 days    Peripheral IV 10/07/22 Left Hand 2 days    Peripheral IV 10/07/22 Right Forearm 2 days          Drain  Duration           Chest Tube 1 Posterior;Mediastinal 32 Fr  2 days    Chest Tube 2 Anterior;Mediastinal 32 Fr  2 days                  Lab, Imaging and other studies:   Results from last 7 days   Lab Units 10/10/22  0434 10/09/22  0405 10/08/22  0406   WBC Thousand/uL 11 17* 14 78* 19 15*   HEMOGLOBIN g/dL 10 3* 11 3* 13 7   HEMATOCRIT % 31 5* 34 0* 39 3   PLATELETS Thousands/uL 125* 109* 199     Results from last 7 days   Lab Units 10/10/22  0434 10/09/22  0405 10/08/22  0406 10/07/22  2358 10/07/22  2104   POTASSIUM mmol/L 3 8 3 9 4 3   < >  --    CHLORIDE mmol/L 108 109* 112*  --   --    CO2 mmol/L 29 26 24  --   --    CO2, I-STAT mmol/L  --   --   --   --  23   BUN mg/dL 36* 36* 28*  --   --    CREATININE mg/dL 1 02 1 11 1 52*  --   --    GLUCOSE, ISTAT mg/dl  --   --   --   --  164*   CALCIUM mg/dL 8 6 8 1* 8 2*  --   --     < > = values in this interval not displayed  Results from last 7 days   Lab Units 10/07/22  2102 10/07/22  1424 10/07/22  0742   INR  1 36* 1 01 0 90   PTT seconds 48*  --  26     Recent Labs     10/08/22  0406   PHART 7 458*   QRK9LEC 24 4   PO2ART 145 9*   [id number] 35 2*   BEART 1 0           Plan:    DC chest tubes  Gentle diuresis  Ambulate  Tx to floor        Cindy Bowen  DATE: October 10, 2022  TIME: 7:37 AM

## 2022-10-10 NOTE — CASE MANAGEMENT
Case Management Discharge Planning Note    Patient name Retia Runner  Location [facility] 404/[facility] 002-71 MRN [id number]  : 1960 Date 10/10/2022       Current Admission Date: 10/7/2022  Current Admission Diagnosis:Acute mitral valve rupture [facility])   Patient Active Problem List    Diagnosis Date Noted   • Severe sepsis ([facility] [address] ) 10/07/2022   • Multifocal pneumonia 10/07/2022   • High anion gap metabolic acidosis    • Elevated troponin 10/07/2022   • Elevated d-dimer 10/07/2022   • CON (acute kidney injury) ([facility] [address] ) 10/07/2022   • Severe mitral regurgitation 10/07/2022   • Acute mitral valve rupture ([facility] [address] ) 10/07/2022   • Cardiogenic shock ([facility] [address] ) 10/07/2022   • Transaminitis 10/07/2022   • History of repair of congenital atrial septal defect (ASD) 10/07/2022      LOS (days): 3  Geometric Mean LOS (GMLOS) (days):   Days to GMLOS:     OBJECTIVE:  Risk of Unplanned Readmission Score: 15 9         Current admission status: Inpatient   Preferred Pharmacy:   [facility]  [facility] [address]  Phone: [phone] Fax: [phone]    Primary Care Provider: Junior Rivera DO    Primary Insurance: BLUE CROSS  Secondary Insurance:     DISCHARGE DETAILS:    Discharge planning discussed with[de-identified] Accepted by [address] [place] [address] Agency Name[de-identified] Other [facility])         Other Referral/Resources/Interventions Provided:  Referral Comments: Accepted [facility]  Additional Comments: Accepted by [facility]

## 2022-10-10 NOTE — PROGRESS NOTES
Cardiology Team Progress Note - Yary Vang 60 y o  male MRN: [id number]    Unit/Bed#: [facility] 404-01 Encounter: [id number]    Assessment/Plan:     Yary Vang is a 60y o  year old male with no known PMHX who originally presented to SOB  Patient was found to be be acute respiratory failure secondary to cardiogenic shock  Patient was found to have acute severe MR that needed emergent MVR  1   Severe MR s/p MVR   ? POD # 3 for emergant MVR for acute severe MR w/ cardiogenic shock; NO Events on tele saw overnight  Patient is still NSR  Plan:   ? Lasix 40 mg BID   ?  mg   ? Lipitor 40 mg   ? Lopressor 25 mg BID   ? Amiodarone 200 mg PO TID     ** Please look at attending note above for final recommendation     Subjective:   Patient seen and examined at bedside  No acute event overnight  Patient denies any chest pain, SOB, palpations or lightheadedness  No concerns per nursing staff  Vitals: Blood pressure 125/85, pulse 71, temperature 97 6 °F (36 4 °C), resp  rate (!) 33, weight 87 1 kg (192 lb 0 3 oz), SpO2 92 %  , Body mass index is 25 33 kg/m² ,   Orthostatic Blood Pressures    Flowsheet Row Most Recent Value   Blood Pressure 125/85 filed at 10/10/2022 1024   Patient Position - Orthostatic VS Sitting filed at 10/10/2022 0800            Intake/Output Summary (Last 24 hours) at 10/10/2022 1227  Last data filed at 10/10/2022 1017  Gross per 24 hour   Intake 1660 ml   Output 2895 ml   Net -1235 ml       Physical Exam:    GEN: Yary Vang appears well, alert and oriented x 3, pleasant and cooperative   HEENT:  Normocephalic, atraumatic, anicteric, moist mucous membranes  NECK: No JVD or carotid bruits   HEART: Regular rhythm and rate, normal S1 and S2, no murmurs, clicks, gallops or rubs   LUNGS: Clear to auscultation bilaterally; no wheezes, rales, or rhonchi; respiration nonlabored   ABDOMEN:  Normoactive bowel sounds, soft, no tenderness, no distention  EXTREMITIES: peripheral pulses palpable; no edema  NEURO: no gross focal findings; cranial nerves grossly intact   SKIN:  Dry, intact, warm to touch      Current Facility-Administered Medications:   •  acetaminophen (TYLENOL) tablet 975 mg, 975 mg, Oral, Q8H, Megan Quinteros PA-C, 975 mg at 10/10/22 0609  •  amiodarone tablet 200 mg, 200 mg, Oral, Q8H [facility], Megan Quinteros PA-C, 200 mg at 10/10/22 8454  •  aspirin tablet 325 mg, 325 mg, Oral, Daily, Megan Quinteros PA-C, 325 mg at 10/10/22 0830  •  atorvastatin (LIPITOR) tablet 40 mg, 40 mg, Oral, Daily With Dinner, Megan Quinteros PA-C, 40 mg at 10/09/22 1733  •  bisacodyl (DULCOLAX) rectal suppository 10 mg, 10 mg, Rectal, Daily PRN, Megan Quinteros PA-C  •  docusate sodium (COLACE) capsule 100 mg, 100 mg, Oral, BID, Megan Quinteros PA-C, 100 mg at 10/09/22 5087  •  fondaparinux (ARIXTRA) subcutaneous injection 2 5 mg, 2 5 mg, Subcutaneous, Q24H, Megan Quinteros PA-C, 2 5 mg at 10/10/22 0831  •  furosemide (LASIX) injection 40 mg, 40 mg, Intravenous, BID (diuretic), Megan Quinteros PA-C, 40 mg at 10/10/22 0831  •  insulin lispro (HumaLOG) 100 units/mL subcutaneous injection 1-5 Units, 1-5 Units, Subcutaneous, TID AC **AND** Fingerstick Glucose (POCT), , , TID AC, Megan Quinteros PA-C  •  insulin lispro (HumaLOG) 100 units/mL subcutaneous injection 1-5 Units, 1-5 Units, Subcutaneous, HS, Megan Quinteros PA-C  •  lidocaine (cardiac) injection 100 mg, 100 mg, Intravenous, Q30 Min PRN, Megan Quinteros PA-C  •  metoprolol tartrate (LOPRESSOR) tablet 25 mg, 25 mg, Oral, Q12H [facility], Megan Quinteros PA-C, 25 mg at 10/10/22 0830  •  mupirocin (BACTROBAN) 2 % nasal ointment 1 application, 1 application, Nasal, J22D [facility], Megan Quinteros PA-C, 1 application at 99/38/96 0831  •  ondansetron (ZOFRAN) injection 4 mg, 4 mg, Intravenous, Q6H PRN, Megan Quinteros PA-C  •  oxyCODONE (ROXICODONE) IR tablet 2 5 mg, 2 5 mg, Oral, Q4H PRN, Megan Quinteros PA-C  •  oxyCODONE (ROXICODONE) IR tablet 5 mg, 5 mg, Oral, Q4H PRN, Megan Quinteros PA-C, 5 mg at 10/10/22 0524  •  pantoprazole (PROTONIX) EC tablet 40 mg, 40 mg, Oral, Daily, Megan Quinteros PA-C, 40 mg at 10/10/22 0508  •  polyethylene glycol (MIRALAX) packet 17 g, 17 g, Oral, Daily, Megan Quinteros PA-C, 17 g at 10/09/22 6326  •  potassium chloride (K-DUR,KLOR-CON) CR tablet 20 mEq, 20 mEq, Oral, BID, Megan Quinteros PA-C, 20 mEq at 10/10/22 0830  •  temazepam (RESTORIL) capsule 15 mg, 15 mg, Oral, HS PRN, Tiago Alonso PA-C    Labs & Results:      Results from last 7 days   Lab Units 10/07/22  0742   NT-PRO BNP pg/mL 2,080*     Results from last 7 days   Lab Units 10/10/22  0434 10/09/22  0405 10/08/22  0406   POTASSIUM mmol/L 3 8 3 9 4 3   CO2 mmol/L 29 26 24   CHLORIDE mmol/L 108 109* 112*   BUN mg/dL 36* 36* 28*   CREATININE mg/dL 1 02 1 11 1 52*     Results from last 7 days   Lab Units 10/10/22  0434 10/09/22  0405 10/08/22  0406   HEMOGLOBIN g/dL 10 3* 11 3* 13 7   HEMATOCRIT % 31 5* 34 0* 39 3   PLATELETS Thousands/uL 125* 109* 199     Results from last 7 days   Lab Units 10/07/22  1424   TRIGLYCERIDES mg/dL 77   HDL mg/dL 110   LDL CALC mg/dL 120*   HEMOGLOBIN A1C % 5 2       Telemetry:   Personally reviewed by [address]        VTE Prophylaxis: Fondaparinux (Arixtra)      Case discussed and reviewed with Dr Jerald Miller and is pending their agreement with the assessment and plan  Thank you for involving us in the care of your patient  [address]  PGY-[address]  [address]          ** Please Note: Fluency DirectDictation voice to text software may have been used in the creation of this document   **

## 2022-10-10 NOTE — UTILIZATION REVIEW
Initial Clinical Review    Admission: Date/Time/Statement:   Admission Orders (From admission, onward)     Ordered        10/07/22 1131  INPATIENT ADMISSION  Once                      Orders Placed This Encounter   Procedures   • INPATIENT ADMISSION     Standing Status:   Standing     Number of Occurrences:   1     Order Specific Question:   Level of Care     Answer:   Level 1 Stepdown [13]     Order Specific Question:   Estimated length of stay     Answer:   More than 2 Midnights     Order Specific Question:   Certification     Answer:   I certify that inpatient services are medically necessary for this patient for a duration of greater than two midnights  See H&P and MD Progress Notes for additional information about the patient's course of treatment  ED Arrival Information     Expected   -    Arrival   10/7/2022 07:29    Acuity   Urgent            Means of arrival   Walk-In    Escorted by   Spouse    Service   Critical Care/ICU    Admission type   Emergency            Arrival complaint   SOB           Chief Complaint   Patient presents with   • Shortness of Breath     Patient presents to the ED with c/o cough and SOB, states seen at urgent and dx with pneumonia and given ABX        Initial Presentation: 60 y o  male from home to ED admitted inpatient due to acute hypoxic respiratory failure due to pulmonary edema/acute mitral regurgitation due to ruptured leaflet/CON  Presented due to worsening shortness of breath with cough, chills and soreness of chest and abdomen starting day prior to arrival   Seen at Urgent Care and told had pneumonia and prescribed inhaler and antibiotics and compliant but no better  On exam: tachycardic  Tachypnea  Lungs rales  Lactic acid 3 7  Procalcitonin 0 28  Bun 25, creatinine 1 34 with unknown baseline  Hs tnl 137  Wbc 22 68  Ct chest showed groundglass opacities  In the ED given 1 liter IVF bolus x 2, ceftriaxone, azithromycin, Duoneb and albuterol neb    Placed on oxygen as sat to 89% and in ED escalated to mid flow oxygen  Plan is stat echo for murmur and showed normal EF but a blown posterior leaflet of his mitral valve with severe mitral regurgitation  IV lasix and start nitroprusside drip  Placed on Bipap  NPO  Continue antibiotics  Consult cardiology    Per Cardiology 10/7/22- patient with acute hypoxic respiratory failure with acute heart failure/severe MR with posterior leaflet flail/Multilobar pneumonia and CON  Plan is urgent transfer to [facility] for cardiac catheterization with IABP placement followed by CT surgery for MVR  Give IV lasix and dc IVF  Continue Bipap  Start Nipride gtt  10/7/22 1505  transferred to [facility] as higher level of care due to need for cardiac cath and mitral valve replacement         ED Triage Vitals   Temperature Pulse Respirations Blood Pressure SpO2   10/07/22 0736 10/07/22 0736 10/07/22 0736 10/07/22 0736 10/07/22 0736   (!) 97 3 °F (36 3 °C) (!) 120 22 121/77 92 %      Temp Source Heart Rate Source Patient Position - Orthostatic VS BP Location FiO2 (%)   10/07/22 0736 10/07/22 0736 10/07/22 0736 10/07/22 0736 10/07/22 1128   Temporal Monitor Sitting Right arm 96      Pain Score       10/07/22 0736       9          Wt Readings from Last 1 Encounters:   10/10/22 87 1 kg (192 lb 0 3 oz)     Additional Vital Signs:   10/07/22 1435 101 48 °F (38 6 °C) Abnormal  120 Abnormal  42 Abnormal  103/72 83 114/55 74 mmHg 98 % -- -- -- -- -- -- --   10/07/22 1400 101 12 °F (38 4 °C) Abnormal  130 Abnormal  28 Abnormal  127/82 98 -- -- 97 % -- -- -- -- -- -- --   10/07/22 1345 100 94 °F (38 3 °C) Abnormal  131 Abnormal  38 Abnormal  142/82 103 -- -- 98 % -- -- -- -- -- -- --   10/07/22 1330 99 14 °F (37 3 °C) 134 Abnormal  33 Abnormal  156/84 111 -- -- 96 % -- -- -- -- -- -- --   10/07/22 1300 -- 144 Abnormal  -- 125/93 -- -- -- -- -- -- -- -- -- -- --   10/07/22 1215 98 9 °F (37 2 °C) 141 Abnormal  46 Abnormal  125/93 105 -- -- 92 % -- -- -- -- High flow nasal cannula Full face mask Lying   10/07/22 1128 -- -- -- -- -- -- -- 93 % 96 -- 50 L/min -- High flow nasal cannula -- --   10/07/22 1100 -- 116 Abnormal  32 Abnormal  118/78 92 -- -- 87 % Abnormal  -- -- -- -- Mid flow nasal cannula -- Sitting   10/07/22 1045 -- 119 Abnormal  34 Abnormal  132/87 103 -- -- 85 % Abnormal  -- -- -- -- Mid flow nasal cannula -- --   10/07/22 0945 -- 115 Abnormal  25 Abnormal  -- -- -- -- 90 % -- 40 -- 5 L/min Nasal cannula         Pertinent Labs/Diagnostic Test Results:   CT chest wo contrast   Final Result by Corrine Crigler, MD (10/07 5395)      Extensive right greater than left groundglass and alveolar opacities are identified  Findings again may reflect multi lobar pneumonia or pulmonary edema or combination of both  Small right greater than left pleural effusions  Workstation performed: [id number]         XR chest portable   ED Interpretation by Charlie Kelly MD (17/86 7271)   Abnormal   Multifocal pneumonia      Final Result by Rafael Burgos MD (10/07 1537)      Multifocal bilateral lung opacity which could be due to edema and/or pneumonia  Workstation performed: [id number]           10/7/22 ecg Interpretation: non-specific     Rate:     ECG rate:  115     ECG rate assessment: tachycardic     Rhythm:     Rhythm: sinus tachycardia     Ectopy:     Ectopy: none     QRS:     QRS axis:  Normal     QRS intervals:  Normal   Conduction:     Conduction: normal     ST segments:     ST segments:  Non-specific     Depression:  V4, aVF and V5    10/7/22 echo Left Ventricle: Left ventricular cavity size is normal  Wall thickness is normal  There is mild asymmetric hypertrophy of the basal septal wall  The left ventricular ejection fraction is 70% by visual estimation  Systolic function is hyperdynamic  Wall motion is normal   •  Mitral Valve:  The posterior leaflet is flail with about 11 mm gap to anterior leaflet  A small segment of a torn chorda tendinea is visualized  The valve does not appear significantly thickened or myxomatous  There is severe regurgitation    Acute severe MR due to flail posterior leaflet  There is no torn papillary seen  A small segment of chorda tendinea is appreciated suggesting ruptured chorda as etiology  The valve is not significant myxomatous  No LV wall motion abnormalities are seen    Results from last 7 days   Lab Units 10/07/22  1144 10/07/22  0742   SARS-COV-2  Negative Negative     Results from last 7 days   Lab Units 10/10/22  0434 10/09/22  0405 10/08/22  0406 10/07/22  2104 10/07/22  2102 10/07/22  1423 10/07/22  0742   WBC Thousand/uL 11 17* 14 78* 19 15*  --   --   --  22 68*   HEMOGLOBIN g/dL 10 3* 11 3* 13 7  --  15 2  --  17 5*   I STAT HEMOGLOBIN g/dl  --   --   --  13 9  --    < >  --    HEMATOCRIT % 31 5* 34 0* 39 3  --  44 6  --  49 7*   HEMATOCRIT, ISTAT %  --   --   --  41  --    < >  --    PLATELETS Thousands/uL 125* 109* 199  --  224   < > 322   NEUTROS ABS Thousands/µL  --   --   --   --   --   --  19 56*    < > = values in this interval not displayed       Results from last 7 days   Lab Units 10/10/22  0434 10/09/22  0405 10/08/22  0406 10/07/22  2358 10/07/22  2104 10/07/22  2102 10/07/22  2000 10/07/22  1935 10/07/22  1906 10/07/22  1900 10/07/22  1423 10/07/22  0742   SODIUM mmol/L 142 141 142  --   --  143  --   --   --   --   --  135   POTASSIUM mmol/L 3 8 3 9 4 3 4 2  --  4 2  --   --   --   --   --  4 8   CHLORIDE mmol/L 108 109* 112*  --   --  111*  --   --   --   --   --  100   CO2 mmol/L 29 26 24  --   --  20*  --   --   --   --   --  21   CO2, I-STAT mmol/L  --   --   --   --  23  --  24 26 31 31   < >  --    ANION GAP mmol/L 5 6 6  --   --  12  --   --   --   --   --  14*   BUN mg/dL 36* 36* 28*  --   --  26*  --   --   --   --   --  25   CREATININE mg/dL 1 02 1 11 1 52*  --   --  1 64*  --   --   --   --   --  1 34*   EGFR ml/min/1 73sq m 77 70 48  --   --  43  --   --   --   --   --  55   CALCIUM mg/dL 8 6 8 1* 8 2*  --   --  8 4  --   --   --   --   --  9 4   CALCIUM, IONIZED, ISTAT mmol/L  --   --   --   --  1 18  --  1 18 1 03* 1 08* 1 00*   < >  --    MAGNESIUM mg/dL 2 6 2 6 3 0*  --   --   --   --   --   --   --   --  1 8   PHOSPHORUS mg/dL  --   --   --   --   --   --   --   --   --   --   --  4 1    < > = values in this interval not displayed  Results from last 7 days   Lab Units 10/07/22  0742   AST U/L 20   ALT U/L 21   ALK PHOS U/L 94   TOTAL PROTEIN g/dL 7 2   ALBUMIN g/dL 3 7   TOTAL BILIRUBIN mg/dL 1 80*     Results from last 7 days   Lab Units 10/10/22  0434 10/09/22  0405 10/08/22  0406 10/07/22  2102 10/07/22  0742   GLUCOSE RANDOM mg/dL 125 141* 155* 168* 155*     Results from last 7 days   Lab Units 10/07/22  2104 10/07/22  2000 10/07/22  1935 10/07/22  1906 10/07/22  1900 10/07/22  1818 10/07/22  1756   PH, CHRISTIANO I-STAT   --   --   --  7 188* 7 290*  --  7 155*   PCO2, CHRISTIANO ISTAT mm HG  --   --   --  75 5* 59 8*  --  65 3*   PO2, CHRISTIANO ISTAT mm HG  --   --   --  56 0* 312 0*  --  59 0*   HCO3, CHRISTIANO ISTAT mmol/L  --   --   --  28 7 28 8  --  23 0*   I STAT BASE EXC mmol/L -5* -3* -2 -1 1   < > -7*   I STAT O2 SAT % 99* 98* 100* 79 100*   < > 81   ISTAT PH ART  7 280* 7 320* 7 343*  --   --    < >  --    I STAT ART PCO2 mm HG 46 6* 44 3* 44 8*  --   --    < >  --    I STAT ART PO2 mm  0* 117 0 313 0*  --   --    < >  --    I STAT ART HCO3 mmol/L 21 9* 22 8 24 3  --   --    < >  --     < > = values in this interval not displayed           Results from last 7 days   Lab Units 10/07/22  1144 10/07/22  0957 10/07/22  0742   HS TNI 0HR ng/L  --   --  137*   HS TNI 2HR ng/L  --  124*  --    HSTNI D2 ng/L  --  -13  --    HS TNI 4HR ng/L 114*  --   --    HSTNI D4 ng/L -23  --   --      Results from last 7 days   Lab Units 10/07/22  0742   D-DIMER QUANTITATIVE ug/ml FEU 0 85*     Results from last 7 days   Lab Units 10/07/22  2102 10/07/22  1424 10/07/22  0742   PROTIME seconds 17 0* 14 0 12 8   INR  1 36* 1 01 0 90   PTT seconds 48*  --  26     Results from last 7 days   Lab Units 10/08/22  0411 10/07/22  1424 10/07/22  0742   PROCALCITONIN ng/ml 2 88* 0 62* 0 28*     Results from last 7 days   Lab Units 10/07/22  1045 10/07/22  0742   LACTIC ACID mmol/L 4 1* 3 7*     Results from last 7 days   Lab Units 10/07/22  0742   NT-PRO BNP pg/mL 2,080*     Results from last 7 days   Lab Units 10/07/22  2358 10/07/22  1430   CLARITY UA  Clear Clear   COLOR UA  Light Yellow Yellow   SPEC GRAV UA  1 033* 1 025   PH UA  5 0 5 0   GLUCOSE UA mg/dl Negative Negative   KETONES UA mg/dl Negative Negative   BLOOD UA  Small* Negative   PROTEIN UA mg/dl Trace* Negative   NITRITE UA  Negative Negative   BILIRUBIN UA  Negative Negative   UROBILINOGEN UA (BE) mg/dl <2 0 <2 0   LEUKOCYTES UA  Negative Negative   WBC UA /hpf 1-2 None Seen   RBC UA /hpf 4-10* 0-1*   BACTERIA UA /hpf None Seen None Seen   EPITHELIAL CELLS WET PREP /hpf Occasional None Seen   MUCUS THREADS  Occasional* Occasional*     Results from last 7 days   Lab Units 10/07/22  1430 10/07/22  1144 10/07/22  0742   STREP PNEUMONIAE ANTIGEN, URINE  Negative  --   --    LEGIONELLA URINARY ANTIGEN  Negative  --   --    INFLUENZA A PCR   --  Negative Negative   INFLUENZA B PCR   --  Negative Negative   RSV PCR   --  Negative Negative     Results from last 7 days   Lab Units 10/08/22  0939 10/08/22  0325 10/07/22  0745 10/07/22  0742   BLOOD CULTURE   --  No Growth at 48 hrs  No Growth at 48 hrs  No Growth at 48 hrs  No Growth at 48 hrs     SPUTUM CULTURE  No growth  --   --   --    GRAM STAIN RESULT  1+ Polys  No bacteria seen  --   --   --        ED Treatment:   Medication Administration from 10/07/2022 0729 to 10/07/2022 1207       Date/Time Order Dose Route Action Comments     10/07/2022 0809 sodium chloride 0 9 % bolus 1,000 mL 1,000 mL Intravenous New Bag      10/07/2022 0809 cefTRIAXone (ROCEPHIN) IVPB (premix in dextrose) 2,000 mg 50 mL 2,000 mg Intravenous New Bag      10/07/2022 0946 sodium chloride 0 9 % bolus 1,000 mL 1,000 mL Intravenous New Bag      10/07/2022 0947 azithromycin (ZITHROMAX) 500 mg in sodium chloride 0 9% 250mL IVPB 500 mg 500 mg Intravenous New Bag      10/07/2022 1127 albuterol inhalation solution 7 5 mg 7 5 mg Nebulization Given      10/07/2022 1127 ipratropium-albuterol (DUO-NEB) 0 5-2 5 mg/3 mL inhalation solution 3 mL 3 mL Nebulization Given         Past Medical History:   Diagnosis Date   • ASD (atrial septal defect)     s/p repair   • Hyperlipidemia    • Severe mitral regurgitation     s/p repair     Present on Admission:  • Severe sepsis (HCC)  • Multifocal pneumonia  • (Resolved) Acute respiratory failure with hypoxia ([facility])  • High anion gap metabolic acidosis  • Elevated troponin  • Elevated d-dimer  • CON (acute kidney injury) ([facility] )      Admitting Diagnosis: Acute respiratory insufficiency [R06 89]  SOB (shortness of breath) [R06 02]  Elevated troponin [R77 8]  Sepsis ([facility] ) [A41 9]  Multifocal pneumonia [J18 9]  Age/Sex: 60 y o  male  Admission Orders: 10/7/22 1131 inpatient   Scheduled Medications  That were administered in ICU:    furosemide (LASIX) injection 40 mg  Dose: 40 mg  Freq: Once Route: IV  Start: 10/07/22 1315 End: 10/07/22 1305    furosemide (LASIX) injection 60 mg  Dose: 60 mg  Freq: Once Route: IV  Start: 10/07/22 1345 End: 10/07/22 1344    ipratropium (ATROVENT) 0 02 % inhalation solution 0 5 mg  Dose: 0 5 mg  Freq: Every 6 hours (RESP) Route: NEBULIZATION  Start: 10/07/22 1400 End: 10/07/22 1548    levalbuterol (XOPENEX) inhalation solution 1 25 mg  Dose: 1 25 mg  Freq: Every 6 hours (RESP) Route: NEBULIZATION  Start: 10/07/22 1400 End: 10/07/22 1548    morphine injection 2 mg  Dose: 2 mg  Freq:  Once Route: IV  Start: 10/07/22 1300 End: 10/07/22 1252    nitroPRUSside (NIPRIDE) 50,000 mcg in dextrose 5 % 250 mL infusion  Rate: 2 6 mL/hr Dose: 0 1 mcg/kg/min  Weight Dosing Info: 86 2 kg  Freq: Titrated Route: IV  Last Dose: 0 2 mcg/kg/min (10/07/22 1435)  Start: 10/07/22 1330 End: 10/07/22 1548    IP CONSULT TO CARDIOLOGY    Network Utilization Review Department  ATTENTION: Please call with any questions or concerns to [phone] and carefully listen to the prompts so that you are directed to the right person  All voicemails are confidential   Edna Albert all requests for admission clinical reviews, approved or denied determinations and any other requests to dedicated fax number below belonging to the campus where the patient is receiving treatment   List of dedicated fax numbers for the Facilities:  [address] DENIALS (Administrative/Medical Necessity) [phone]   [address] (Maternity/NICU/Pediatrics) [phone]   [address] [phone]   [facility] [phone]   [address] [facility] [place] [address] [address] [address] [phone]   [address] [facility] [address] [phone]

## 2022-10-11 VITALS
SYSTOLIC BLOOD PRESSURE: 138 MMHG | OXYGEN SATURATION: 94 % | WEIGHT: 185.63 LBS | RESPIRATION RATE: 18 BRPM | BODY MASS INDEX: 24.49 KG/M2 | TEMPERATURE: 98.1 F | HEART RATE: 80 BPM | DIASTOLIC BLOOD PRESSURE: 91 MMHG

## 2022-10-11 PROBLEM — D62 ACUTE BLOOD LOSS AS CAUSE OF POSTOPERATIVE ANEMIA: Status: ACTIVE | Noted: 2022-10-11

## 2022-10-11 LAB
ANION GAP SERPL CALCULATED.3IONS-SCNC: 5 MMOL/L (ref 4–13)
BUN SERPL-MCNC: 25 MG/DL (ref 5–25)
CALCIUM SERPL-MCNC: 8.8 MG/DL (ref 8.3–10.1)
CHLORIDE SERPL-SCNC: 107 MMOL/L (ref 96–108)
CO2 SERPL-SCNC: 30 MMOL/L (ref 21–32)
CREAT SERPL-MCNC: 0.94 MG/DL (ref 0.6–1.3)
ERYTHROCYTE [DISTWIDTH] IN BLOOD BY AUTOMATED COUNT: 12.6 % (ref 11.6–15.1)
GFR SERPL CREATININE-BSD FRML MDRD: 85 ML/MIN/1.73SQ M
GLUCOSE SERPL-MCNC: 108 MG/DL (ref 65–140)
GLUCOSE SERPL-MCNC: 108 MG/DL (ref 65–140)
GLUCOSE SERPL-MCNC: 109 MG/DL (ref 65–140)
HCT VFR BLD AUTO: 32.6 % (ref 36.5–49.3)
HGB BLD-MCNC: 10.7 G/DL (ref 12–17)
MAGNESIUM SERPL-MCNC: 2.4 MG/DL (ref 1.6–2.6)
MCH RBC QN AUTO: 29.7 PG (ref 26.8–34.3)
MCHC RBC AUTO-ENTMCNC: 32.8 G/DL (ref 31.4–37.4)
MCV RBC AUTO: 91 FL (ref 82–98)
PLATELET # BLD AUTO: 154 THOUSANDS/UL (ref 149–390)
PMV BLD AUTO: 11.4 FL (ref 8.9–12.7)
POTASSIUM SERPL-SCNC: 3.5 MMOL/L (ref 3.5–5.3)
RBC # BLD AUTO: 3.6 MILLION/UL (ref 3.88–5.62)
SODIUM SERPL-SCNC: 142 MMOL/L (ref 135–147)
WBC # BLD AUTO: 8.47 THOUSAND/UL (ref 4.31–10.16)

## 2022-10-11 PROCEDURE — 82948 REAGENT STRIP/BLOOD GLUCOSE: CPT

## 2022-10-11 PROCEDURE — 99024 POSTOP FOLLOW-UP VISIT: CPT | Performed by: PHYSICIAN ASSISTANT

## 2022-10-11 PROCEDURE — NC001 PR NO CHARGE: Performed by: INTERNAL MEDICINE

## 2022-10-11 PROCEDURE — 83735 ASSAY OF MAGNESIUM: CPT | Performed by: PHYSICIAN ASSISTANT

## 2022-10-11 PROCEDURE — 97116 GAIT TRAINING THERAPY: CPT

## 2022-10-11 PROCEDURE — 80048 BASIC METABOLIC PNL TOTAL CA: CPT | Performed by: PHYSICIAN ASSISTANT

## 2022-10-11 PROCEDURE — 85027 COMPLETE CBC AUTOMATED: CPT | Performed by: PHYSICIAN ASSISTANT

## 2022-10-11 RX ORDER — METOPROLOL TARTRATE 50 MG/1
50 TABLET, FILM COATED ORAL EVERY 12 HOURS SCHEDULED
Qty: 60 TABLET | Refills: 3 | Status: SHIPPED | OUTPATIENT
Start: 2022-10-11 | End: 2022-10-28

## 2022-10-11 RX ORDER — ACETAMINOPHEN 325 MG/1
650 TABLET ORAL EVERY 6 HOURS PRN
Qty: 30 TABLET | Refills: 0
Start: 2022-10-11

## 2022-10-11 RX ORDER — OMEPRAZOLE 20 MG/1
20 TABLET, DELAYED RELEASE ORAL DAILY
Qty: 30 TABLET | Refills: 0 | Status: SHIPPED | OUTPATIENT
Start: 2022-10-11

## 2022-10-11 RX ORDER — TORSEMIDE 20 MG/1
20 TABLET ORAL DAILY
Qty: 7 TABLET | Refills: 0 | Status: SHIPPED | OUTPATIENT
Start: 2022-10-11 | End: 2022-10-25

## 2022-10-11 RX ORDER — ATORVASTATIN CALCIUM 40 MG/1
40 TABLET, FILM COATED ORAL
Qty: 30 TABLET | Refills: 3 | Status: SHIPPED | OUTPATIENT
Start: 2022-10-11

## 2022-10-11 RX ORDER — ASPIRIN 325 MG
325 TABLET ORAL DAILY
Qty: 30 TABLET | Refills: 3 | Status: SHIPPED | OUTPATIENT
Start: 2022-10-12 | End: 2022-10-28

## 2022-10-11 RX ORDER — OXYCODONE HYDROCHLORIDE 5 MG/1
5 TABLET ORAL EVERY 6 HOURS PRN
Qty: 20 TABLET | Refills: 0 | Status: SHIPPED | OUTPATIENT
Start: 2022-10-11 | End: 2022-10-21

## 2022-10-11 RX ORDER — POTASSIUM CHLORIDE 20 MEQ/1
20 TABLET, EXTENDED RELEASE ORAL DAILY
Qty: 7 TABLET | Refills: 0 | Status: SHIPPED | OUTPATIENT
Start: 2022-10-11 | End: 2022-10-25

## 2022-10-11 RX ORDER — METOPROLOL TARTRATE 50 MG/1
50 TABLET, FILM COATED ORAL EVERY 12 HOURS SCHEDULED
Status: DISCONTINUED | OUTPATIENT
Start: 2022-10-11 | End: 2022-10-11 | Stop reason: HOSPADM

## 2022-10-11 RX ORDER — TORSEMIDE 20 MG/1
20 TABLET ORAL 2 TIMES DAILY
Status: DISCONTINUED | OUTPATIENT
Start: 2022-10-11 | End: 2022-10-11 | Stop reason: HOSPADM

## 2022-10-11 RX ADMIN — TORSEMIDE 20 MG: 20 TABLET ORAL at 08:03

## 2022-10-11 RX ADMIN — DOCUSATE SODIUM 100 MG: 100 CAPSULE, LIQUID FILLED ORAL at 08:02

## 2022-10-11 RX ADMIN — ACETAMINOPHEN 975 MG: 325 TABLET, FILM COATED ORAL at 05:04

## 2022-10-11 RX ADMIN — PANTOPRAZOLE SODIUM 40 MG: 40 TABLET, DELAYED RELEASE ORAL at 08:03

## 2022-10-11 RX ADMIN — MUPIROCIN 1 APPLICATION: 20 OINTMENT TOPICAL at 08:03

## 2022-10-11 RX ADMIN — FONDAPARINUX SODIUM 2.5 MG: 2.5 INJECTION, SOLUTION SUBCUTANEOUS at 08:03

## 2022-10-11 RX ADMIN — POLYETHYLENE GLYCOL 3350 17 G: 17 POWDER, FOR SOLUTION ORAL at 08:02

## 2022-10-11 RX ADMIN — METOPROLOL TARTRATE 50 MG: 50 TABLET ORAL at 08:03

## 2022-10-11 RX ADMIN — AMIODARONE HYDROCHLORIDE 200 MG: 200 TABLET ORAL at 05:05

## 2022-10-11 RX ADMIN — POTASSIUM CHLORIDE 20 MEQ: 1500 TABLET, EXTENDED RELEASE ORAL at 08:03

## 2022-10-11 RX ADMIN — ASPIRIN 325 MG ORAL TABLET 325 MG: 325 PILL ORAL at 08:02

## 2022-10-11 NOTE — CASE MANAGEMENT
Case Management Discharge Planning Note    Patient name Susanne Ortiz  Location [address] 404/PPHP 893-94 MRN [id number]  : 1960 Date 10/11/2022       Current Admission Date: 10/7/2022  Current Admission Diagnosis:Acute mitral valve rupture [facility])   Patient Active Problem List    Diagnosis Date Noted   • Severe sepsis ([facility] ) 10/07/2022   • Multifocal pneumonia 10/07/2022   • High anion gap metabolic acidosis    • Elevated troponin 10/07/2022   • Elevated d-dimer 10/07/2022   • CON (acute kidney injury) ([facility] ) 10/07/2022   • Severe mitral regurgitation 10/07/2022   • Acute mitral valve rupture ([facility] ) 10/07/2022   • Cardiogenic shock ([facility] ) 10/07/2022   • Transaminitis 10/07/2022   • History of repair of congenital atrial septal defect (ASD) 10/07/2022      LOS (days): 4  Geometric Mean LOS (GMLOS) (days):   Days to GMLOS:     OBJECTIVE:  Risk of Unplanned Readmission Score: 12 71         Current admission status: Inpatient   Preferred Pharmacy:   [facility], [address] [place] [address]  Phone: [phone] Fax: [phone]    Primary Care Provider: Constance Garay DO    Primary Insurance: BLUE CROSS  Secondary Insurance:     DISCHARGE DETAILS:                                          Other Referral/Resources/Interventions Provided:  Interventions: HHC                                             IMM Given (Date)::  (NA)        Additional Comments: [facility] home today [facility]

## 2022-10-11 NOTE — PHYSICAL THERAPY NOTE
PHYSICAL THERAPY NOTE          Patient Name: Liz Irwin  BODFC'R Date: 10/11/2022         10/11/22 1054   PT Last Visit   PT Visit Date 10/11/22   Note Type   Note Type Treatment   Pain Assessment   Pain Assessment Tool 0-10   Pain Score No Pain   Restrictions/Precautions   Other Precautions Cardiac/sternal;Telemetry   General   Chart Reviewed Yes   Additional Pertinent History cleared for Tx session (spoke to nscesia)   Response to Previous Treatment Patient with no complaints from previous session  Cognition   Overall Cognitive Status WFL   Arousal/Participation Alert; Cooperative   Attention Within functional limits   Orientation Level Oriented to person;Oriented to place;Oriented to situation   Memory Within functional limits   Following Commands Follows all commands and directions without difficulty   Subjective   Subjective Alert; intially in the chair; later --> in the BR; noted to be coming out of the BR on his own and w/o AD; agreeable to amb further and try steps   Transfers   Sit to Stand 6  Modified independent   Stand to Sit 6  Modified independent   Ambulation/Elevation   Gait pattern Short stride  (no overt LOB, knee buckling or excessive swaying)   Gait Assistance 6  Modified independent   Assistive Device None   Distance 2 x 110 ft w/ steps negotiation in between; after seated rest period, another 2 x 200 ft w/ standing rest stop in between   Stair Management Assistance 6  Modified independent   Additional items   (reviewed sequencing and UE precaution on the hand rail)   Stair Management Technique One rail R;Step to pattern;Nonreciprocal   Number of Stairs 7   Balance   Static Sitting Good   Static Standing Fair +   Ambulatory Fair   Activity Tolerance   Activity Tolerance Patient tolerated treatment well   Nurse Made Aware spoke to Mychal Dior RN   Equipment Use   Comments Pt reports he was able to perform HEP on his own   Assessment   Assessment Pt demonstrated overall improvement in balance, endurance and all aspects of observed mobility progressing to mod (I) level on level surfaces (incl amb w/o AD) and on the steps; no overt uncorrected LOB, gross knee buckling, or excessive swaying observed during the session; no increased discomfort or excessive fatigue expressed; pt appeared to be comfortable at the end of session; overall, cont to anticipate pt will return home w/ available family support upon D/C from PT/mobility stand point and when medically cleared; no other immediate PT needs otherwise identified while in the hospital; pt informed and concurred; pt expressed no concerns about going home from mobility stand point, when medically cleared; will therefore sign off   Goals   Patient Goals to go home   PT Treatment Day 1   Plan   Treatment/Interventions Spoke to nursing   Progress Discontinue PT   PT Frequency Other (Comment)  (D/C PT)   Recommendation   PT Discharge Recommendation No rehabilitation needs   AM-PAC Basic Mobility Inpatient   Turning in Bed Without Bedrails 4   Lying on Back to Sitting on Edge of Flat Bed 4   Moving Bed to Chair 4   Standing Up From Chair 4   Walk in Room 4   Climb 3-5 Stairs 4   Basic Mobility Inpatient Raw Score 24   Basic Mobility Standardized Score 57 68   Highest Level Of Mobility   JH-HLM Goal 8: Walk 250 feet or more   JH-HLM Achieved 8: Walk 250 feet ot more   Education   Education Provided Mobility training   Patient Demonstrates verbal understanding   End of Consult   Patient Position at End of Consult Bedside chair; All needs within [facility]

## 2022-10-11 NOTE — DISCHARGE SUMMARY
Discharge Summary - Cardiothoracic Surgery   Kate Guillory 60 y o  male MRN: [id number]  [facility] Encounter: [id number]    Admission Date: 10/7/2022     Discharge Date: 10/11/22    Admitting Diagnosis: No admission diagnoses are documented for this encounter  Primary Discharge Diagnosis:   Severe Mitral regurgitation  S/P Emergent MV repair/ASD repair;    Secondary Discharge Diagnosis:   ASD, HLD, CON, resolved, Cardiogenic shock s/p IABP and subsequent removal, resolved, ABLA    Attending: JOVANNA Sams  Consulting Physician(s):   Cardiology  Medical/Critical Care    Procedures Performed:   Procedure(s):  MITRAL VALVE REPAIR WITH 34MM PHYSIO II ANNULOPLASTY RING  REPAIR ATRIAL SEPTAL DEFECT (ASD)     Hospital Course:   10/7: 61 y/o CM presents for evaluation of acute severe MR  Patient went to urgent care w/i past few days for sough/SOB & was dx w/ PNA & given abx (Amoxicillin) w/o improvement therefore presented to [facility] 10/7  CXR w/ PNA vs edema, CT chest w/ same  Started on abx (Rocephin/Azithromycin), COVID (-), placed on midflow in ED & transferred to ICU for management  (+) murmur heard on exam w/ acute worsening of respiratory status from HFNC to BiPAP w/ IVF running  TTE w/ wide-open severe MR  Started on Nitroprusside & [facility] for diagnostic cath/IABP placement & cardiac sx consultation  Taken to cath lab for diagnostic cardiac cath & insertion IABP  Taken emergently for ASD repair, MV repair (#34mm Ortiz Physio II)  Transferred to ICU on levo 11, epi 3  IABP 1:1  Not bleeding  NSR  Wean to extubate  Pre-op UA from baer insertion PTA (-) infx    10/8: Remains intubated, on vent wean this morning  Continues on IABP 1:1, Epi 2, Levo 10mcg and Precedex   Tmax 103 1 (present preoperatively) with fevers throughout the night, despite ATC Tylenol  COVID negative x 2  Blood cultures sent, started on Cefepime  NSR  +1 8L/24 hrs   WBC 19 (22), Hg 13, Cr 1 5 (Cr 1 3 on presentation, baseline unknown)  Profound pulmonary edema on CXR  Follows commands  Wean Epi and Levo as able  Add Vaso  Maintain IABP 1:1  Wean to extubate  Trend fevers, continue abx    10/9: Weaned off gtts, continues on IABP 1:1  Fevers improved  Given Lasix x 2 with good response  Cultures with no growth to date; continue abx for now  Extubated yesterday 4L NC  WBC 14, Cr 1 1  SVO2 64%  D/C IABP, start Metoprolol and Lasix  D/C EPW, maintain CTs  Start Lasix 40mg IV BID  Maintain baer  D/C O'Brien later today    10/10: Delined  Cultures negative, no further temp, off antibiotics  RA, diuresing well  Labs stable  Increase lopressor to 25 mg bid  D/C CT and Transfer to telemetry    10/11: NSR, increase lopressor to 50 BID  D/C TLC  Change to torsemide 20mg BID  Sats good on RA  SSI  Ambulating independently  +BM  Home today       Condition at Discharge:   good     Discharge Physical Exam:    Please see the documented physical exam from this morning's progress note for details  Discharge Data:  Results from last 7 days   Lab Units 10/11/22  0432 10/10/22  0434 10/09/22  0405   WBC Thousand/uL 8 47 11 17* 14 78*   HEMOGLOBIN g/dL 10 7* 10 3* 11 3*   HEMATOCRIT % 32 6* 31 5* 34 0*   PLATELETS Thousands/uL 154 125* 109*     Results from last 7 days   Lab Units 10/11/22  0432 10/10/22  0434 10/09/22  0405 10/07/22  2358 10/07/22  2104   POTASSIUM mmol/L 3 5 3 8 3 9   < >  --    CHLORIDE mmol/L 107 108 109*   < >  --    CO2 mmol/L 30 29 26   < >  --    CO2, I-STAT mmol/L  --   --   --   --  23   BUN mg/dL 25 36* 36*   < >  --    CREATININE mg/dL 0 94 1 02 1 11   < >  --    GLUCOSE, ISTAT mg/dl  --   --   --   --  164*   CALCIUM mg/dL 8 8 8 6 8 1*   < >  --     < > = values in this interval not displayed       Results from last 7 days   Lab Units 10/07/22  2102 10/07/22  1424 10/07/22  0742   INR  1 36* 1 01 0 90   PTT seconds 48*  --  26       Discharge instructions/Information to patient and family:   See after visit summary for information provided to patient and family  Sudha Morales was educated on restrictions regarding driving and lifting, and techniques of proper incisional care  They were specifically counselled on signs and symptoms of an incisional infection, and advised to contact our service immediately should they develop fevers, sweats, chill, redness or drainage at the site of any incisions  Provisions for Follow-Up Care:  See after visit summary for information related to follow-up care and any pertinent home health orders  Disposition:  Home    Planned Readmission:   No    Discharge Medications:  See after visit summary for reconciled discharge medications provided to patient and family  Sudha Morales was provided contact information and scheduled a follow up appointment with JOVANNA Chong  Additionally, follow up appointments have been scheduled for their primary care physician and primary cardiologist   Contact information was provided  Sudha Andrew was counseled on the importance of avoiding tobacco products  As with all patients whom have undergone open heart surgery, tobacco cessation medication was contraindicated at the time of discharge  ACE/ARB was not prescribed, EF>40% and uptitrating BB    Beta Blocker was Prescribed at discharge    Aspirin was Prescribed at discharge    Statin was Prescribed at discharge    The patient was discharged on ongoing diuretic therapy with Torsemide 20 mg, PO QD and Potassium Chloride 20 mEq, PO QD  They were advised to continue these medications for 7 days, unless otherwise directed  Narcotic pain medication was prescribed in the form of Oxycodone  Prior to prescribing, their prescription profile was reviewed on the [facility] of health prescription drug monitoring program     The patient was informed that following their postoperative surgical evaluation, they will be referred to outpatient cardiac rehabilitation  They were counseled that this program is run by specialists who will help them safely strengthen their heart and prevent more heart disease  Cardiac rehabilitation will include exercise, relaxation, stress management, and heart-healthy nutrition  Caregivers will also check to make sure their medication regimen is working  During this admission, the patient was questioned on their use of tobacco, alcohol, and illicit/non-prescription drug use in the  previous 24 months  Demian Crump states that they have not used any of these substances in this time frame  I spent 30 minutes discharging the patient  This time was spent on the day of discharge  I had direct contact with the patient on the day of discharge  Additional documentation is required if more than 30 minutes were spent on discharge       SIGNATURE: Sukh Acosta  DATE: October 11, 2022  TIME: 11:25 AM

## 2022-10-11 NOTE — PLAN OF CARE
Problem: PHYSICAL THERAPY ADULT  Goal: Performs mobility at highest level of function for planned discharge setting  See evaluation for individualized goals  Description: Treatment/Interventions: Functional transfer training, LE strengthening/ROM, Elevations, Therapeutic exercise, Endurance training, Bed mobility, Gait training, Spoke to nursing, OT          See flowsheet documentation for full assessment, interventions and recommendations  Outcome: Adequate for Discharge  Note: Prognosis: Good  Problem List: Decreased strength, Impaired balance, Decreased mobility, Decreased endurance  Assessment: Pt demonstrated overall improvement in balance, endurance and all aspects of observed mobility progressing to mod (I) level on level surfaces (incl amb w/o AD) and on the steps; no overt uncorrected LOB, gross knee buckling, or excessive swaying observed during the session; no increased discomfort or excessive fatigue expressed; pt appeared to be comfortable at the end of session; overall, cont to anticipate pt will return home w/ available family support upon D/C from PT/mobility stand point and when medically cleared; no other immediate PT needs otherwise identified while in the hospital; pt informed and concurred; pt expressed no concerns about going home from mobility stand point, when medically cleared; will therefore sign off        PT Discharge Recommendation: No rehabilitation needs    See flowsheet documentation for full assessment

## 2022-10-11 NOTE — PLAN OF CARE
Problem: Prexisting or High Potential for Compromised Skin Integrity  Goal: Skin integrity is maintained or improved  Description: INTERVENTIONS:  - Identify patients at risk for skin breakdown  - Assess and monitor skin integrity  - Assess and monitor nutrition and hydration status  - Monitor labs   - Assess for incontinence   - Turn and reposition patient  - Assist with mobility/ambulation  - Relieve pressure over bony prominences  - Avoid friction and shearing  - Provide appropriate hygiene as needed including keeping skin clean and dry  - Evaluate need for skin moisturizer/barrier cream  - Collaborate with interdisciplinary team   - Patient/family teaching  - Consider wound care consult   Outcome: Adequate for Discharge     Problem: Nutrition/Hydration-ADULT  Goal: Nutrient/Hydration intake appropriate for improving, restoring or maintaining nutritional needs  Description: Monitor and assess patient's nutrition/hydration status for malnutrition  Collaborate with interdisciplinary team and initiate plan and interventions as ordered  Monitor patient's weight and dietary intake as ordered or per policy  Utilize nutrition screening tool and intervene as necessary  Determine patient's food preferences and provide high-protein, high-caloric foods as appropriate       INTERVENTIONS:  - Monitor oral intake, urinary output, labs, and treatment plans  - Assess nutrition and hydration status and recommend course of action  - Evaluate amount of meals eaten  - Assist patient with eating if necessary   - Allow adequate time for meals  - Recommend/ encourage appropriate diets, oral nutritional supplements, and vitamin/mineral supplements  - Order, calculate, and assess calorie counts as needed  - Recommend, monitor, and adjust tube feedings and TPN/PPN based on assessed needs  - Assess need for intravenous fluids  - Provide specific nutrition/hydration education as appropriate  - Include patient/family/caregiver in decisions related to nutrition  Outcome: Adequate for Discharge     Problem: COPING  Goal: Pt/Family able to verbalize concerns and demonstrate effective coping strategies  Description: INTERVENTIONS:  - Assist patient/family to identify coping skills, available support systems and cultural and spiritual values  - Provide emotional support, including active listening and acknowledgement of concerns of patient and caregivers  - Reduce environmental stimuli, as able  - Provide patient education  - Assess for spiritual pain/suffering and initiate spiritual care, including notification of Pastoral Care or amor based community as needed  - Assess effectiveness of coping strategies  Outcome: Adequate for Discharge  Goal: Will report anxiety at manageable levels  Description: INTERVENTIONS:  - Administer medication as ordered  - Teach and encourage coping skills  - Provide emotional support  - Assess patient/family for anxiety and ability to cope  Outcome: Adequate for Discharge     Problem: CHANGE IN BODY IMAGE  Goal: Pt/Family communicate acceptance of loss or change in body image and expresses psychological comfort and peace  Description: INTERVENTIONS:  - Assess patient/family anxiety and grief process related to change in body image, loss of functional status and loss of sense of self  - Assess patient/family's coping skills and provide emotional, spiritual and psychosocial support  - Provide information about the patient's health status with consideration of family and cultural values  - Communicate willingness to discuss loss and facilitate grief process with patient/family as appropriate  - Emphasize sustaining relationships within family system and community, or amor/spiritual traditions  - Refer to community support groups as appropriate  - Initiate Spiritual Care, Pastoral care or other ancillary consults as needed  Outcome: Adequate for Discharge     Problem: DECISION MAKING  Goal: Pt/Family able to effectively weigh alternatives and participate in decision making related to treatment and care  Description: INTERVENTIONS:  - Identify decision maker  - Determine when there are differences among patient's view, family's view, and healthcare provider's view of patient condition and care goals  - Facilitate patient/family articulation of goals for care  - Help patient/family identify pros/cons of alternative solutions  - Provide information as requested by patient/family  - Respect patient/family rights related to privacy and sharing information   - Serve as a liaison between patient, family and health care team  - Initiate consults as appropriate (Ethics Team, Palliative Care, Family Care Conference, etc )  Outcome: Adequate for Discharge     Problem: CONFUSION/THOUGHT DISTURBANCE  Goal: Thought disturbances (confusion, delirium, depression, dementia or psychosis) are managed to maintain or return to baseline mental status and functional level  Description: INTERVENTIONS:  - Assess for possible contributors to  thought disturbance, including but not limited to medications, infection, impaired vision or hearing, underlying metabolic abnormalities, dehydration, respiratory compromise,  psychiatric diagnoses and notify attending PHYSICAN/AP  - Monitor and intervene to maintain adequate nutrition, hydration, elimination, sleep and activity  - Decrease environmental stimuli, including noise as appropriate  - Provide frequent contacts to provide refocusing, direction and reassurance as needed  Approach patient calmly with eye contact and at their level    - [place] high risk fall precautions, aspiration precautions and other safety measures, as indicated  - If delirium suspected, notify physician/AP of change in condition and request immediate in-person evaluation  - Pursue consults as appropriate including Geriatric (campus dependent), OT for cognitive evaluation/activity planning, psychiatric, pastoral care, etc   Outcome: Adequate for Discharge     Problem: BEHAVIOR  Goal: Pt/Family maintain appropriate behavior and adhere to behavioral management agreement, if implemented  Description: INTERVENTIONS:  - Assess the family dynamic   - Encourage verbalization of thoughts and concerns in a socially appropriate manner  - Assess patient/family's coping skills and non-compliant behavior (including use of illegal substances)  - Utilize positive, consistent limit setting strategies supporting safety of patient, staff and others  - Initiate consult with Case Management, Spiritual Care or other ancillary services as appropriate  - If a patient's/visitor's behavior jeopardizes the safety of the patient, staff, or others, refer to organization procedure  - Notify Security of behavior or suspected illegal substances which indicate the need for search of the patient and/or belongings  - Encourage participation in the decision making process about a behavioral management agreement; implement if patient meets criteria  Outcome: Adequate for Discharge     Problem: SELF HARM  Goal: Effect of psychiatric condition will be minimized and patient will be protected from self harm  Description: INTERVENTIONS:  - Assess impact of patient's symptoms on level of functioning, self-care needs and offer support as indicated  - Assess patient/family knowledge of depression, impact on illness and need for teaching  - Provide emotional support, presence and reassurance  - Assess for possible suicidal thoughts, ideation or self-harm  If patient expresses suicidal thoughts or statements do not leave alone, notify physician/AP immediately, initiate suicide precautions, and determine need for continual observation    - initiate consults and referrals as appropriate (a mental health professional, Spiritual Care  Outcome: Adequate for Discharge     Problem: MOBILITY - ADULT  Goal: Maintain or return to baseline ADL function  Description: INTERVENTIONS:  -  Assess patient's ability to carry out ADLs; assess patient's baseline for ADL function and identify physical deficits which impact ability to perform ADLs (bathing, care of mouth/teeth, toileting, grooming, dressing, etc )  - Assess/evaluate cause of self-care deficits   - Assess range of motion  - Assess patient's mobility; develop plan if impaired  - Assess patient's need for assistive devices and provide as appropriate  - Encourage maximum independence but intervene and supervise when necessary  - Involve family in performance of ADLs  - Assess for home care needs following discharge   - Consider OT consult to assist with ADL evaluation and planning for discharge  - Provide patient education as appropriate  Outcome: Adequate for Discharge  Goal: Maintains/Returns to pre admission functional level  Description: INTERVENTIONS:  - Perform BMAT or MOVE assessment daily    - Set and communicate daily mobility goal to care team and patient/family/caregiver  - Collaborate with rehabilitation services on mobility goals if consulted  - Perform Range of Motion  times a day  - Reposition patient every  hours    - Dangle patient  times a day  - Stand patient  times a day  - Ambulate patient  times a day  - Out of bed to chair  times a day   - Out of bed for meals  times a day  - Out of bed for toileting  - Record patient progress and toleration of activity level   Outcome: Adequate for Discharge     Problem: Potential for Falls  Goal: Patient will remain free of falls  Description: INTERVENTIONS:  - Educate patient/family on patient safety including physical limitations  - Instruct patient to call for assistance with activity   - Consult OT/PT to assist with strengthening/mobility   - Keep Call bell within reach  - Keep bed low and locked with side rails adjusted as appropriate  - Keep care items and personal belongings within reach  - Initiate and maintain comfort rounds  - Make Fall Risk Sign visible to staff  - Offer Toileting every Hours, in advance of need  - Initiate/Maintain alarm  - Obtain necessary fall risk management equipmen  - Apply yellow socks and bracelet for high fall risk patients  - Consider moving patient to room near nurses station  Outcome: Adequate for Discharge     Problem: CARDIOVASCULAR - ADULT  Goal: Maintains optimal cardiac output and hemodynamic stability  Description: INTERVENTIONS:  - Monitor I/O, vital signs and rhythm  - Monitor for S/S and trends of decreased cardiac output  - Administer and titrate ordered vasoactive medications to optimize hemodynamic stability  - Assess quality of pulses, skin color and temperature  - Assess for signs of decreased coronary artery perfusion  - Instruct patient to report change in severity of symptoms  Outcome: Adequate for Discharge  Goal: Absence of cardiac dysrhythmias or at baseline rhythm  Description: INTERVENTIONS:  - Continuous cardiac monitoring, vital signs, obtain 12 lead EKG if ordered  - Administer antiarrhythmic and heart rate control medications as ordered  - Monitor electrolytes and administer replacement therapy as ordered  Outcome: Adequate for Discharge

## 2022-10-11 NOTE — PLAN OF CARE
Problem: Prexisting or High Potential for Compromised Skin Integrity  Goal: Skin integrity is maintained or improved  Description: INTERVENTIONS:  - Identify patients at risk for skin breakdown  - Assess and monitor skin integrity  - Assess and monitor nutrition and hydration status  - Monitor labs   - Assess for incontinence   - Turn and reposition patient  - Assist with mobility/ambulation  - Relieve pressure over bony prominences  - Avoid friction and shearing  - Provide appropriate hygiene as needed including keeping skin clean and dry  - Evaluate need for skin moisturizer/barrier cream  - Collaborate with interdisciplinary team   - Patient/family teaching  - Consider wound care consult   Outcome: Progressing     Problem: Nutrition/Hydration-ADULT  Goal: Nutrient/Hydration intake appropriate for improving, restoring or maintaining nutritional needs  Description: Monitor and assess patient's nutrition/hydration status for malnutrition  Collaborate with interdisciplinary team and initiate plan and interventions as ordered  Monitor patient's weight and dietary intake as ordered or per policy  Utilize nutrition screening tool and intervene as necessary  Determine patient's food preferences and provide high-protein, high-caloric foods as appropriate       INTERVENTIONS:  - Monitor oral intake, urinary output, labs, and treatment plans  - Assess nutrition and hydration status and recommend course of action  - Evaluate amount of meals eaten  - Assist patient with eating if necessary   - Allow adequate time for meals  - Recommend/ encourage appropriate diets, oral nutritional supplements, and vitamin/mineral supplements  - Order, calculate, and assess calorie counts as needed  - Recommend, monitor, and adjust tube feedings and TPN/PPN based on assessed needs  - Assess need for intravenous fluids  - Provide specific nutrition/hydration education as appropriate  - Include patient/family/caregiver in decisions related to nutrition  Outcome: Progressing     Problem: COPING  Goal: Pt/Family able to verbalize concerns and demonstrate effective coping strategies  Description: INTERVENTIONS:  - Assist patient/family to identify coping skills, available support systems and cultural and spiritual values  - Provide emotional support, including active listening and acknowledgement of concerns of patient and caregivers  - Reduce environmental stimuli, as able  - Provide patient education  - Assess for spiritual pain/suffering and initiate spiritual care, including notification of Pastoral Care or amor based community as needed  - Assess effectiveness of coping strategies  Outcome: Progressing  Goal: Will report anxiety at manageable levels  Description: INTERVENTIONS:  - Administer medication as ordered  - Teach and encourage coping skills  - Provide emotional support  - Assess patient/family for anxiety and ability to cope  Outcome: Progressing     Problem: CHANGE IN BODY IMAGE  Goal: Pt/Family communicate acceptance of loss or change in body image and expresses psychological comfort and peace  Description: INTERVENTIONS:  - Assess patient/family anxiety and grief process related to change in body image, loss of functional status and loss of sense of self  - Assess patient/family's coping skills and provide emotional, spiritual and psychosocial support  - Provide information about the patient's health status with consideration of family and cultural values  - Communicate willingness to discuss loss and facilitate grief process with patient/family as appropriate  - Emphasize sustaining relationships within family system and community, or amor/spiritual traditions  - Refer to community support groups as appropriate  - Initiate Spiritual Care, Pastoral care or other ancillary consults as needed  Outcome: Progressing

## 2022-10-11 NOTE — PROGRESS NOTES
Progress Note - Cardiothoracic Surgery   Colby Joseph 60 y o  male MRN: [id number]  Unit/Bed#: [facility] 404-01 Encounter: [id number]    Mitral regurgitation   S/P Emergent MV Repair; POD # 4      24 Hour Events: Doing well no complaints, ambulating independently, +BM, good appetite    Medications:   Scheduled Meds:  Current Facility-Administered Medications   Medication Dose Route Frequency Provider Last Rate   • acetaminophen  975 mg Oral Q8H Megan Quinteros PA-C     • amiodarone  200 mg Oral [facility] Megan Quinteros PA-C     • aspirin  325 mg Oral Daily Megan Quinteros PA-C     • atorvastatin  40 mg Oral Daily With [employer], PA-C     • bisacodyl  10 mg Rectal Daily PRN Megan Quinteros PA-C     • docusate sodium  100 mg Oral BID Megan Quinteros PA-C     • fondaparinux  2 5 mg Subcutaneous Q24H Megan Quinteros PA-C     • furosemide  40 mg Intravenous BID (diuretic) Megan Quinteros PA-C     • insulin lispro  1-5 Units Subcutaneous TID AC Megan Quinteros PA-C     • insulin lispro  1-5 Units Subcutaneous HS Megan Quinteros PA-C     • lidocaine (cardiac)  100 mg Intravenous Q30 Min PRN Adriana Barrientos PA-C     • metoprolol tartrate  25 mg Oral Q12H [address] Megan Quinteros PA-C     • mupirocin  1 application Nasal N17T [address] Megan Quinteros PA-C     • ondansetron  4 mg Intravenous Q6H PRN Megan Quinteros PA-C     • oxyCODONE  2 5 mg Oral Q4H PRN Megan Quniteros PA-C     • oxyCODONE  5 mg Oral Q4H PRN Megan Quinteros PA-C     • pantoprazole  40 mg Oral Daily Megan Quinteros PA-C     • polyethylene glycol  17 g Oral Daily Megan Quinteros PA-C     • potassium chloride  20 mEq Oral BID Megan Quinteros PA-C     • temazepam  15 mg Oral HS PRN Megan Quinteros PA-C       Continuous Infusions:   PRN Meds: •  bisacodyl  •  lidocaine (cardiac)  •  ondansetron  •  oxyCODONE  •  oxyCODONE  •  temazepam    Vitals:   Vitals:    10/10/22 2156 10/11/22 0000 10/11/22 0600 10/11/22 0706   BP: 140/87   140/89   BP Location:       Pulse: 81   84   Resp:    20   Temp: 98 7 °F (37 1 °C)   97 6 °F (36 4 °C)   TempSrc:       SpO2: 93% 95%  95%   Weight:   84 2 kg (185 lb 10 oz)        Telemetry: NSR; Heart Rate: 84    Respiratory:   SpO2: SpO2: 95 %; Room Air    Intake/Output:     Intake/Output Summary (Last 24 hours) at 10/11/2022 0736  Last data filed at 10/11/2022 1958  Gross per 24 hour   Intake 340 ml   Output 3500 ml   Net -3160 ml        Chest tube Output:  Removed    Weights:   Weight (last 2 days)     Date/Time Weight    10/11/22 0600 84 2 (185 63)    10/10/22 0538 87 1 (192 02)    10/09/22 0543 89 (196 21)            Results:   Results from last 7 days   Lab Units 10/11/22  0432 10/10/22  0434 10/09/22  0405   WBC Thousand/uL 8 47 11 17* 14 78*   HEMOGLOBIN g/dL 10 7* 10 3* 11 3*   HEMATOCRIT % 32 6* 31 5* 34 0*   PLATELETS Thousands/uL 154 125* 109*     Results from last 7 days   Lab Units 10/11/22  0432 10/10/22  0434 10/09/22  0405 10/07/22  2358 10/07/22  2104   SODIUM mmol/L 142 142 141   < >  --    POTASSIUM mmol/L 3 5 3 8 3 9   < >  --    CHLORIDE mmol/L 107 108 109*   < >  --    CO2 mmol/L 30 29 26   < >  --    CO2, I-STAT mmol/L  --   --   --   --  23   BUN mg/dL 25 36* 36*   < >  --    CREATININE mg/dL 0 94 1 02 1 11   < >  --    GLUCOSE, ISTAT mg/dl  --   --   --   --  164*   CALCIUM mg/dL 8 8 8 6 8 1*   < >  --     < > = values in this interval not displayed  Results from last 7 days   Lab Units 10/07/22  2102 10/07/22  1424 10/07/22  0742   INR  1 36* 1 01 0 90   PTT seconds 48*  --  26     Point of care glucose: 100- 108    Studies:  None today    Invasive Lines/Tubes:  Invasive Devices  Report    Central Venous Catheter Line  Duration           CVC Central Lines 10/07/22 Triple 3 days                Physical Exam:    HEENT/NECK:  Normocephalic  Atraumatic   no jugular venous distention      Cardiac: Regular rate and rhythm  Pulmonary:  Breath sounds clear bilaterally and No rales/rhonchi/wheezes  Abdomen:  Non-tender, Non-distended and Normal bowel sounds  Incisions: Sternum is stable  Incision is clean, dry, and intact  Extremities: Extremities warm/dry and No edema B/L  Neuro: Alert and oriented X 3, Sensation is grossly intact and No focal deficits  Skin: Warm/Dry, without rashes or lesions  Assessment:  Principal Problem:    Acute mitral valve rupture (HCC)  Active Problems:    CON (acute kidney injury) ([facility] [address] )    Severe mitral regurgitation    Cardiogenic shock (HCC)    Transaminitis    History of repair of congenital atrial septal defect (ASD)       Mitral regurgitation  S/P Emergent MV Repair; POD # 4    Plan:    1  Cardiac:   NSR; HR/BP well-controlled  Increase to Lopressor, 50mg PO BID  Continue ASA and Statin therapy  Epicardial pacing wires out  Central IV access no longer required; Remove central venous catheter today  Continue DVT prophylaxis    2  Pulmonary:   Good Room air oxygen saturation; Continue incentive spirometry/Coughing/Deep breathing exercises  Chest tubes have been discontinued    3  Renal:   Intake/Output net: -3 1 L/24 hours, UO 3 5L, baer out  Diuretic Regimen:  Change lasix to Torsemide 20mg po BID  Continue Potassium Chloride 20 mEq PO BID  Post op Creatinine stable; Follow up labs prn    4  Neuro:  Neurologically intact; No active issues  Incisional pain well-controlled  Continue Tylenol, 975 mg PO q 8, standing dose  Continue Oxycodone, 2 5 to 5 mg PO q 4 hours prn pain    5  GI:  Tolerating TLC 2 3 gm sodium diet  Maintain 1800 mL daily fluid restriction   Continue stool softeners and prn suppository  Continue GI prophylaxis    6  Endo:   Glucose well-controlled with sliding scale coverage    7    Hematology:    Post-operative blood count acceptable; Trend prn    Afebrile, Abx stopped yesterday, WBC 8 4    8     Disposition:      Ambulating independently,  Anticipate discharge to home today    VTE Pharmacologic Prophylaxis: Fondaparinux (Arixtra)  VTE Mechanical Prophylaxis: sequential compression device    Collaborative rounds completed with supervising physician  Plan of care discussed with bedside nurse    SIGNATURE: Randell Oshea  DATE: October 11, 2022  TIME: 7:36 AM

## 2022-10-11 NOTE — PROGRESS NOTES
Cardiology Progress Note - Tamika Damian 60 y o  male MRN: [id number]    Unit/Bed#: [facility] 404-01 Encounter: [id number]      Subjective:   Patient seen and examined  No acute overnight events  Denies chest pain, shortness of breath, extremity swelling, palpitations  Feels well  Denies additional complaints  Hospital Course:   Tamika Damian is a 60y o  year old male with no known PMH, POD 4 s/p emergent mitral valve repair  Vitals: Blood pressure 140/89, pulse 84, temperature 97 6 °F (36 4 °C), resp  rate 20, weight 84 2 kg (185 lb 10 oz), SpO2 95 %  , Body mass index is 24 49 kg/m² ,   Orthostatic Blood Pressures    Flowsheet Row Most Recent Value   Blood Pressure 140/89 filed at 10/11/2022 0706   Patient Position - Orthostatic VS Sitting filed at 10/10/2022 0800          Intake/Output Summary (Last 24 hours) at 10/11/2022 1016  Last data filed at 10/11/2022 0830  Gross per 24 hour   Intake 220 ml   Output 3325 ml   Net -3105 ml       Physical Exam:    GEN: Tamika Damian appears well, alert and oriented x 3, pleasant and cooperative   HEENT:  Normocephalic, atraumatic, anicteric, moist mucous membranes  NECK: No JVD  HEART: Regular rhythm, regular rate, normal S1 and S2, no murmurs, clicks, gallops or rubs   LUNGS: Clear to auscultation bilaterally; no wheezes, rales, or rhonchi; respiration nonlabored on room air  EXTREMITIES: No lower extremity edema bilaterally  NEURO: Grossly intact  SKIN:  Dry, intact, warm to touch   Sternotomy incision cdi    Current Facility-Administered Medications:   •  acetaminophen (TYLENOL) tablet 975 mg, 975 mg, Oral, Q8H, Megan Quinteros PA-C, 975 mg at 10/11/22 0497  •  amiodarone tablet 200 mg, 200 mg, Oral, Q8H STONE, Megan Quinteros PA-C, 200 mg at 10/11/22 0505  •  aspirin tablet 325 mg, 325 mg, Oral, Daily, Megan Quinteros PA-C, 325 mg at 10/11/22 0802  •  atorvastatin (LIPITOR) tablet 40 mg, 40 mg, Oral, Daily With [employer], Megan Quinteros PA-C, 40 mg at 10/10/22 8068  •  bisacodyl (DULCOLAX) rectal suppository 10 mg, 10 mg, Rectal, Daily PRN, Megan Quinteros PA-C  •  docusate sodium (COLACE) capsule 100 mg, 100 mg, Oral, BID, Megan Quinteros PA-C, 100 mg at 10/11/22 0802  •  fondaparinux (ARIXTRA) subcutaneous injection 2 5 mg, 2 5 mg, Subcutaneous, Q24H, Megan Quinteros PA-C, 2 5 mg at 10/11/22 0803  •  insulin lispro (HumaLOG) 100 units/mL subcutaneous injection 1-5 Units, 1-5 Units, Subcutaneous, TID AC **AND** Fingerstick Glucose (POCT), , , TID AC, Megan Quinteros PA-C  •  insulin lispro (HumaLOG) 100 units/mL subcutaneous injection 1-5 Units, 1-5 Units, Subcutaneous, HS, Megan Quinteros PA-C  •  lidocaine (cardiac) injection 100 mg, 100 mg, Intravenous, Q30 Min PRN, Megan Quinteros PA-C  •  metoprolol tartrate (LOPRESSOR) tablet 50 mg, 50 mg, Oral, Q12H [address], Warner Poole, GAYLA, 50 mg at 10/11/22 0803  •  mupirocin (BACTROBAN) 2 % nasal ointment 1 application, 1 application, Nasal, Q59L [address], Megan Quinteros PA-C, 1 application at 82/72/82 0803  •  ondansetron (ZOFRAN) injection 4 mg, 4 mg, Intravenous, Q6H PRN, Megan Quinteros PA-C  •  oxyCODONE (ROXICODONE) IR tablet 2 5 mg, 2 5 mg, Oral, Q4H PRN, Megan Quinteros PA-C  •  oxyCODONE (ROXICODONE) IR tablet 5 mg, 5 mg, Oral, Q4H PRN, Megan Quinteros PA-C, 5 mg at 10/10/22 0524  •  pantoprazole (PROTONIX) EC tablet 40 mg, 40 mg, Oral, Daily, Megan Quinteros PA-C, 40 mg at 10/11/22 0803  •  polyethylene glycol (MIRALAX) packet 17 g, 17 g, Oral, Daily, Megan Quinteros PA-C, 17 g at 10/11/22 0802  •  potassium chloride (K-DUR,KLOR-CON) CR tablet 20 mEq, 20 mEq, Oral, BID, Megan Quinteros PA-C, 20 mEq at 10/11/22 0094  •  temazepam (RESTORIL) capsule 15 mg, 15 mg, Oral, HS PRN, Megan Quinteros PA-C  •  torsemide (DEMADEX) tablet 20 mg, 20 mg, Oral, BID, Warner Pooel PA-C, 20 mg at 10/11/22 0803    Labs & Results:  Results from last 7 days   Lab Units 10/07/22  1144 10/07/22  0957 10/07/22  0742   HS TNI 0HR ng/L  --   --  137*   HS TNI 2HR ng/L  --  124*  -- HS TNI 4HR ng/L 114*  --   --      Results from last 7 days   Lab Units 10/07/22  0742   NT-PRO BNP pg/mL 2,080*     Results from last 7 days   Lab Units 10/11/22  0432 10/10/22  0434 10/09/22  0405   POTASSIUM mmol/L 3 5 3 8 3 9   CO2 mmol/L 30 29 26   CHLORIDE mmol/L 107 108 109*   BUN mg/dL 25 36* 36*   CREATININE mg/dL 0 94 1 02 1 11     Results from last 7 days   Lab Units 10/11/22  0432 10/10/22  0434 10/09/22  0405   HEMOGLOBIN g/dL 10 7* 10 3* 11 3*   HEMATOCRIT % 32 6* 31 5* 34 0*   PLATELETS Thousands/uL 154 125* 109*     Results from last 7 days   Lab Units 10/07/22  1424   TRIGLYCERIDES mg/dL 77   HDL mg/dL 110   LDL CALC mg/dL 120*   HEMOGLOBIN A1C % 5 2       Telemetry:   Personally reviewed by Lorrie Olson: NSR with one PVC noted at approximately 8:30 this morning  VTE Prophylaxis: Sequential compression device (Venodyne)  and Fondaparinux (Arixtra)       Assessment:  Principal Problem:    Acute mitral valve rupture (HCC)  Active Problems:    CON (acute kidney injury) (HCC)    Severe mitral regurgitation    Cardiogenic shock (HCC)    Transaminitis    History of repair of congenital atrial septal defect (ASD)      Assessment/Plan:  60 yom POD 4 s/p emergent mitral valve repair  Doing well, hemodynamically stable  - Continue lopressor 50 mg PO BID,  mg, atorvastatin 40 mg  - Continue diuresis torsemide 20 mg BID  - Discharge per primary team    Thank you for involving us in the care of your patient      ---  Lorrie Olson, M4  Cardiology

## 2022-10-12 LAB
BACTERIA BLD CULT: NORMAL
BACTERIA BLD CULT: NORMAL

## 2022-10-12 PROCEDURE — 88305 TISSUE EXAM BY PATHOLOGIST: CPT | Performed by: PATHOLOGY

## 2022-10-12 PROCEDURE — 88313 SPECIAL STAINS GROUP 2: CPT | Performed by: PATHOLOGY

## 2022-10-12 NOTE — UTILIZATION REVIEW
Notification of Discharge   This is a Notification of Discharge from our facility [address]  Please be advised that this patient has been discharge from our facility  Below you will find the admission and discharge date and time including the patient’s disposition  UTILIZATION REVIEW CONTACT:  Angela England  Utilization   Network Utilization Review Department  Phone: [phone] x carefully listen to the prompts  All voicemails are confidential   Email: [email or web address]     PHYSICIAN ADVISORY SERVICES:  FOR [id number] REVIEW - MEDICAL NECESSITY DENIAL  Phone: [phone]  Fax: [phone]  Email: [email or web address]     PRESENTATION DATE: 10/7/2022  3:48 PM  OBERVATION ADMISSION DATE  INPATIENT ADMISSION DATE: 10/7/22  3:48 PM   DISCHARGE DATE: 10/11/2022  1:44 PM  DISPOSITION: Home with [place] with [address] INFORMATION:  Send all requests for admission clinical reviews, approved or denied determinations and any other requests to dedicated fax number below belonging to the campus where the patient is receiving treatment   List of dedicated fax numbers:  [address] DENIALS (Administrative/Medical Necessity) [phone]   [address] [place] [address] (Maternity/NICU/Pediatrics) [phone]   [facility] [phone]   [place] [address] [phone]   [address] [phone]   [address] [facility] [phone]   [address] [facility] [phone]   [address] [place] [address] 119 [phone]   [facility]  [phone]   [address] [facility] [phone]   [address] [facility] [address] [phone]

## 2022-10-13 LAB
BACTERIA BLD CULT: NORMAL
BACTERIA BLD CULT: NORMAL

## 2022-10-18 ENCOUNTER — TELEPHONE (OUTPATIENT)
Dept: CARDIAC SURGERY | Facility: CLINIC | Age: 62
End: 2022-10-18

## 2022-10-18 NOTE — TELEPHONE ENCOUNTER
Surgery: emergent ASD repair, MV repair on 10/7 with Dr Lisy Rivera  Weights:  Preop 10/7: 190 lbs  Postop 10/8: 201 lbs  On discharge 10/11: 185 lbs  Today 10/18: 172 5 lbs    Patient reports he is feeling wonderful  He has been keeping close track of his vitals - BP/HR/O2 sat/weight  He finished his 7 days of torsemide/KCl yesterday, and has not experienced any lightheadedness/dizziness, shortness of breath or LE edema  Denies fevers/chills  Appetite is fair, having normal bowel movements  Incision healing well, without drainage or bleeding  He is using IS and getting up to 1500 mL  He has been walking, slowly increasing his distance, now up to 0 5 miles daily  He plans to increase his distance gradually  Answered questions and reviewed upcoming appointments

## 2022-10-25 ENCOUNTER — OFFICE VISIT (OUTPATIENT)
Dept: CARDIOLOGY CLINIC | Facility: CLINIC | Age: 62
End: 2022-10-25

## 2022-10-25 VITALS
SYSTOLIC BLOOD PRESSURE: 98 MMHG | WEIGHT: 184 LBS | BODY MASS INDEX: 24.39 KG/M2 | DIASTOLIC BLOOD PRESSURE: 56 MMHG | HEIGHT: 73 IN | HEART RATE: 90 BPM

## 2022-10-25 DIAGNOSIS — I34.0 NONRHEUMATIC MITRAL (VALVE) INSUFFICIENCY: Primary | ICD-10-CM

## 2022-10-25 NOTE — PROGRESS NOTES
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    A  10/7/2022- S/P ASD and MV repair w/ #34mm Ortiz Physio II ring   B  On ASA 325mg QD and Lopressor 50mg BID   2  Recent onset HFpEF    A  Secondary to #1 - recieved Torsemide/KCl x7d post-op   B  Wt on discharge (10/11)- 185lbs    C  Euvolemic at present - Wt today 184lbs   3  Hyperlipidemia    A  [facility] 10/7/2022 - total chol 245, triglycerides 77, ,    B  Placed on Lipitor 40mg QD      Diagnostics  Cardiac catheterization 10/7/2022: No angiographic evidence of CAD  TTE 10/7/2022: EF 70%, MV posterior leaflet is flail with a 11mm gap to anterior leaflet with severe MR, torn chordae visualized  EKG 10/8/2022: NSR, early repolarization     PLAN:  · Continue ASA 325mg and Lipitor 40mg QD  · Continue Lopressor 50mg BID   · Scheduled for CTS follow up with Dr Tejal Day on 11/11/2022  · Currently enrolled in cardiopulmonary rehab post-op  · Advised on maintaining a <2GM sodium and <1 8L fluid restriction  · Maintain daily weights and ambulatory BP monitoring  · Report any symptoms of concern or report to the ED for evaluation     Interval History/ HPI:   Amrit Pierre is a 60year old male with history of acute MR secondary to chord rupture and congenital ASD s/p repair 10/7/2022 and hyperlipidemia who presents for hospital follow up visit  Patient presented to [facility] on 10/07/2022 with worsening dyspnea persistent cough with blood-tinged sputum  Patient had been evaluated at local urgent care a few days prior to admission  Chest x-ray showed multilobar pneumonia and was treated with amoxicillin  When his symptoms did not resolve he re-presented to [facility] ED for evaluation  Repeat chest x-ray showed multilobar pneumonia versus pulmonary edema   Patient had significant lactic acidosis and leukocytosis with CON and elevated troponins  He was treated with aggressive IVF on the floor but had increasing O2 requirement and transferred to ICU service  Patient began having more shortness of breath with sinus tachycardia on telemetry in the 130s to 180s  On ICU exam, he was noted with a new systolic murmur  Stat echo was ordered which showed EF of 70% severe MR with posterior leaflet flail  Patient was started on IV nitroprusside for afterload reduction  Case was discussed with CT surgery Dr Eri Torres, ICU attending at [facility] and interventional cardiologist Dr Terry Weiss who agreed for urgent transfer for cardiac catheterization with IABP placement prior to MVR/ASD repair on 10/07/2022  His medications were optimized prior to his discharge on 10/11/2022 to include being added for Lopressor, atorvastatin and full ASA  He was placed on postop torsemide/KCL for 1 week post discharge as well  Since discharge, patient reports he is doing very well from a cardiovascular standpoint and denies any cardiac symptoms to include chest pain, discomfort, palpitations, shortness of breath, orthopnea, acute weight change, PND or lower extremity edema  Reports compliance with his medications and denies any ill side effects  He reports he is working on his exertional capacity is able to walk approximately 1 mi  His weights overall have been stable  He has been restricting his salt and fluid intake at home  He is currently scheduled to start cardiac rehab in a few weeks and has postop follow-up with CTS on 11/11/2022  Takes daily weights at home have been in the 170-175lb range  SBP has been in the 105-115 range with medications           Vitals:  BP 98/56  HR 90  Weight 184    Past Medical History:   Diagnosis Date   • ASD (atrial septal defect)     s/p repair   • Hyperlipidemia    • Severe mitral regurgitation     s/p repair     Social History     Socioeconomic History   • Marital status: /Civil Union     Spouse name: Not on file   • Number of children: Not on file   • Years of education: Not on file   • Highest education level: Not on file   Occupational History   • Not on file   Tobacco Use   • Smoking status: Never Smoker   • Smokeless tobacco: Never Used   Substance and Sexual Activity   • Alcohol use: Not Currently   • Drug use: Not Currently   • Sexual activity: Not on file   Other Topics Concern   • Not on file   Social History Narrative   • Not on file     Social Determinants of Health     Financial Resource Strain: Not on file   Food Insecurity: No Food Insecurity   • Worried About Running Out of Food in the Last Year: Never true   • Ran Out of Food in the Last Year: Never true   Transportation Needs: Not on file   Physical Activity: Not on file   Stress: Not on file   Social Connections: Not on file   Intimate Partner Violence: Not on file   Housing Stability: Low Risk    • Unable to Pay for Housing in the Last Year: No   • Number of [place] in the Last Year: 1   • Unstable Housing in the Last Year: No      No family history on file  Past Surgical History:   Procedure Laterality Date   • ATRIAL SEPTECTOMY  10/7/2022    Procedure: REPAIR ATRIAL SEPTAL DEFECT (ASD); Surgeon: Izabela Stevenson MD;  Location: [facility];  Service: Cardiac Surgery   • CARDIAC CATHETERIZATION N/A 10/7/2022    Procedure: CARDIAC CATHETERIZATION;  Surgeon: Tosha Hector DO;  Location: [facility]; Service: Cardiology   • CARDIAC CATHETERIZATION N/A 10/7/2022    Procedure: Cardiac Coronary Angiogram;  Surgeon: Tosha Hector DO;  [facility]: [facility]; Service: Cardiology   • CARDIAC CATHETERIZATION N/A 10/7/2022    Procedure: Cardiac iabp; Surgeon: Tosha Hector DO;  Location: [facility];   Service: Cardiology   • INGUINAL HERNIA REPAIR Left     unsure mesh placement   • MN REPLACEMENT OF MITRAL VALVE N/A 10/7/2022    Procedure: MITRAL VALVE REPAIR WITH 34MM PHYSIO II ANNULOPLASTY RING;  Surgeon: Rommie Duane Lee Downs MD;  Location: [facility];  Service: Cardiac Surgery       Current Outpatient Medications:   •  acetaminophen (TYLENOL) 325 mg tablet, Take 2 tablets (650 mg total) by mouth every 6 (six) hours as needed for mild pain, Disp: 30 tablet, Rfl: 0  •  Ascorbic Acid (vitamin C) 1000 MG tablet, every 24 hours, Disp: , Rfl:   •  aspirin 325 mg tablet, Take 1 tablet (325 mg total) by mouth daily Do not start before October 12, 2022 , Disp: 30 tablet, Rfl: 3  •  atorvastatin (LIPITOR) 40 mg tablet, Take 1 tablet (40 mg total) by mouth daily with dinner, Disp: 30 tablet, Rfl: 3  •  Cyanocobalamin (Vitamin B-12) 500 MCG LOZG, every 24 hours, Disp: , Rfl:   •  Lysine 1000 MG TABS, , Disp: , Rfl:   •  metoprolol tartrate (LOPRESSOR) 50 mg tablet, Take 1 tablet (50 mg total) by mouth every 12 (twelve) hours, Disp: 60 tablet, Rfl: 3  •  Multiple Vitamin (MULTI-VITAMIN DAILY PO), , Disp: , Rfl:   •  Omega-3 Fatty Acids (Fish Oil) 500 MG CAPS, Every 12 hours, Disp: , Rfl:   •  omeprazole (PriLOSEC OTC) 20 MG tablet, Take 1 tablet (20 mg total) by mouth daily, Disp: 30 tablet, Rfl: 0  •  potassium chloride (K-DUR,KLOR-CON) 20 mEq tablet, Take 1 tablet (20 mEq total) by mouth daily Take for 7 days then stop, Disp: 7 tablet, Rfl: 0  •  torsemide (DEMADEX) 20 mg tablet, Take 1 tablet (20 mg total) by mouth daily Take for 7 days then stop, Disp: 7 tablet, Rfl: 0    Review of Systems:  Review of Systems   Constitutional: Negative for appetite change, chills, diaphoresis, fatigue and fever  Respiratory: Negative for cough, chest tightness and shortness of breath  Cardiovascular: Negative for chest pain, palpitations and leg swelling  Gastrointestinal: Negative for diarrhea, nausea and vomiting  Endocrine: Negative for cold intolerance and heat intolerance  Genitourinary: Negative for difficulty urinating, dysuria and enuresis  Musculoskeletal: Negative for arthralgias, back pain and gait problem  Allergic/Immunologic: Negative for environmental allergies and food allergies  Neurological: Negative for dizziness, facial asymmetry and headaches  Hematological: Negative for adenopathy  Does not bruise/bleed easily  Psychiatric/Behavioral: Negative for agitation, behavioral problems and confusion  Physical Exam:  Physical Exam  Constitutional:       Appearance: He is well-developed  HENT:      Right Ear: External ear normal       Left Ear: External ear normal    Eyes:      Pupils: Pupils are equal, round, and reactive to light  Cardiovascular:      Rate and Rhythm: Normal rate and regular rhythm  Heart sounds: Normal heart sounds  No murmur heard  No friction rub  No gallop  Pulmonary:      Effort: Pulmonary effort is normal       Breath sounds: Normal breath sounds  Abdominal:      Palpations: Abdomen is soft  Musculoskeletal:         General: Normal range of motion  Cervical back: Normal range of motion  Skin:     General: Skin is warm and dry  Neurological:      Mental Status: He is alert and oriented to person, place, and time  Deep Tendon Reflexes: Reflexes are normal and symmetric  Psychiatric:         Behavior: Behavior normal          Thought Content: Thought content normal          Judgment: Judgment normal        This note was completed in part utilizing G5 Direct Software  Grammatical errors, random word insertions, spelling mistakes, and incomplete sentences can be an occasional consequence of this system secondary to software limitations, ambient noise, and hardware issues  If you have any questions or concerns about the content, text, or information contained within the body of this dictation, please contact the provider for clarification

## 2022-10-26 ENCOUNTER — APPOINTMENT (OUTPATIENT)
Dept: RADIOLOGY | Facility: HOSPITAL | Age: 62
End: 2022-10-26
Payer: COMMERCIAL

## 2022-10-26 ENCOUNTER — HOSPITAL ENCOUNTER (INPATIENT)
Facility: HOSPITAL | Age: 62
LOS: 2 days | Discharge: HOME | End: 2022-10-28
Attending: EMERGENCY MEDICINE | Admitting: HOSPITALIST
Payer: COMMERCIAL

## 2022-10-26 ENCOUNTER — TELEPHONE (OUTPATIENT)
Dept: OTHER | Facility: OTHER | Age: 62
End: 2022-10-26

## 2022-10-26 DIAGNOSIS — I48.92 ATRIAL FLUTTER WITH RAPID VENTRICULAR RESPONSE (HCC): ICD-10-CM

## 2022-10-26 DIAGNOSIS — I48.92 ATRIAL FLUTTER (HCC): Primary | ICD-10-CM

## 2022-10-26 PROBLEM — I50.32 CHRONIC HEART FAILURE WITH PRESERVED EJECTION FRACTION (HFPEF) (HCC): Status: ACTIVE | Noted: 2022-10-26

## 2022-10-26 PROBLEM — E78.5 HLD (HYPERLIPIDEMIA): Status: ACTIVE | Noted: 2022-10-26

## 2022-10-26 PROBLEM — Z98.890 S/P MVR (MITRAL VALVE REPAIR): Status: ACTIVE | Noted: 2022-10-26

## 2022-10-26 LAB
2HR DELTA HS TROPONIN: 1 NG/L
4HR DELTA HS TROPONIN: 3 NG/L
ALBUMIN SERPL BCP-MCNC: 3.2 G/DL (ref 3.5–5)
ALP SERPL-CCNC: 124 U/L (ref 46–116)
ALT SERPL W P-5'-P-CCNC: 38 U/L (ref 12–78)
ANION GAP SERPL CALCULATED.3IONS-SCNC: 7 MMOL/L (ref 4–13)
AST SERPL W P-5'-P-CCNC: 20 U/L (ref 5–45)
ATRIAL RATE: 266 BPM
BASOPHILS # BLD AUTO: 0.03 THOUSANDS/ÂΜL (ref 0–0.1)
BASOPHILS NFR BLD AUTO: 0 % (ref 0–1)
BILIRUB SERPL-MCNC: 0.6 MG/DL (ref 0.2–1)
BUN SERPL-MCNC: 14 MG/DL (ref 5–25)
CALCIUM ALBUM COR SERPL-MCNC: 9.7 MG/DL (ref 8.3–10.1)
CALCIUM SERPL-MCNC: 9.1 MG/DL (ref 8.3–10.1)
CARDIAC TROPONIN I PNL SERPL HS: 10 NG/L
CARDIAC TROPONIN I PNL SERPL HS: 11 NG/L
CARDIAC TROPONIN I PNL SERPL HS: 13 NG/L
CHLORIDE SERPL-SCNC: 105 MMOL/L (ref 96–108)
CO2 SERPL-SCNC: 27 MMOL/L (ref 21–32)
CREAT SERPL-MCNC: 1.18 MG/DL (ref 0.6–1.3)
EOSINOPHIL # BLD AUTO: 0.12 THOUSAND/ÂΜL (ref 0–0.61)
EOSINOPHIL NFR BLD AUTO: 1 % (ref 0–6)
ERYTHROCYTE [DISTWIDTH] IN BLOOD BY AUTOMATED COUNT: 12.5 % (ref 11.6–15.1)
GFR SERPL CREATININE-BSD FRML MDRD: 65 ML/MIN/1.73SQ M
GLUCOSE SERPL-MCNC: 137 MG/DL (ref 65–140)
HCT VFR BLD AUTO: 37.9 % (ref 36.5–49.3)
HGB BLD-MCNC: 12.6 G/DL (ref 12–17)
IMM GRANULOCYTES # BLD AUTO: 0.04 THOUSAND/UL (ref 0–0.2)
IMM GRANULOCYTES NFR BLD AUTO: 1 % (ref 0–2)
LYMPHOCYTES # BLD AUTO: 1.93 THOUSANDS/ÂΜL (ref 0.6–4.47)
LYMPHOCYTES NFR BLD AUTO: 22 % (ref 14–44)
MCH RBC QN AUTO: 29.8 PG (ref 26.8–34.3)
MCHC RBC AUTO-ENTMCNC: 33.2 G/DL (ref 31.4–37.4)
MCV RBC AUTO: 90 FL (ref 82–98)
MONOCYTES # BLD AUTO: 0.63 THOUSAND/ÂΜL (ref 0.17–1.22)
MONOCYTES NFR BLD AUTO: 7 % (ref 4–12)
NEUTROPHILS # BLD AUTO: 6.06 THOUSANDS/ÂΜL (ref 1.85–7.62)
NEUTS SEG NFR BLD AUTO: 69 % (ref 43–75)
NRBC BLD AUTO-RTO: 0 /100 WBCS
P AXIS: 270 DEGREES
PLATELET # BLD AUTO: 425 THOUSANDS/UL (ref 149–390)
PMV BLD AUTO: 10.5 FL (ref 8.9–12.7)
POTASSIUM SERPL-SCNC: 4.3 MMOL/L (ref 3.5–5.3)
PROT SERPL-MCNC: 6.8 G/DL (ref 6.4–8.4)
QRS AXIS: 17 DEGREES
QRSD INTERVAL: 82 MS
QT INTERVAL: 302 MS
QTC INTERVAL: 449 MS
RBC # BLD AUTO: 4.23 MILLION/UL (ref 3.88–5.62)
SODIUM SERPL-SCNC: 139 MMOL/L (ref 135–147)
T WAVE AXIS: 69 DEGREES
TSH SERPL DL<=0.05 MIU/L-ACNC: 3.73 UIU/ML (ref 0.45–4.5)
VENTRICULAR RATE: 133 BPM
WBC # BLD AUTO: 8.81 THOUSAND/UL (ref 4.31–10.16)

## 2022-10-26 PROCEDURE — 93005 ELECTROCARDIOGRAM TRACING: CPT

## 2022-10-26 PROCEDURE — 71046 X-RAY EXAM CHEST 2 VIEWS: CPT

## 2022-10-26 PROCEDURE — 36415 COLL VENOUS BLD VENIPUNCTURE: CPT

## 2022-10-26 PROCEDURE — 80053 COMPREHEN METABOLIC PANEL: CPT | Performed by: EMERGENCY MEDICINE

## 2022-10-26 PROCEDURE — 84443 ASSAY THYROID STIM HORMONE: CPT | Performed by: PHYSICIAN ASSISTANT

## 2022-10-26 PROCEDURE — 85025 COMPLETE CBC W/AUTO DIFF WBC: CPT | Performed by: EMERGENCY MEDICINE

## 2022-10-26 PROCEDURE — 84484 ASSAY OF TROPONIN QUANT: CPT | Performed by: EMERGENCY MEDICINE

## 2022-10-26 PROCEDURE — 99222 1ST HOSP IP/OBS MODERATE 55: CPT | Performed by: HOSPITALIST

## 2022-10-26 RX ORDER — DILTIAZEM HYDROCHLORIDE 5 MG/ML
10 INJECTION INTRAVENOUS ONCE
Status: COMPLETED | OUTPATIENT
Start: 2022-10-26 | End: 2022-10-26

## 2022-10-26 RX ORDER — ASPIRIN 325 MG
325 TABLET ORAL DAILY
Status: DISCONTINUED | OUTPATIENT
Start: 2022-10-27 | End: 2022-10-27

## 2022-10-26 RX ORDER — ATORVASTATIN CALCIUM 40 MG/1
40 TABLET, FILM COATED ORAL
Status: DISCONTINUED | OUTPATIENT
Start: 2022-10-26 | End: 2022-10-28 | Stop reason: HOSPADM

## 2022-10-26 RX ADMIN — DILTIAZEM HYDROCHLORIDE 10 MG: 5 INJECTION INTRAVENOUS at 12:06

## 2022-10-26 RX ADMIN — SODIUM CHLORIDE 500 ML: 0.9 INJECTION, SOLUTION INTRAVENOUS at 11:39

## 2022-10-26 RX ADMIN — ATORVASTATIN CALCIUM 40 MG: 40 TABLET, FILM COATED ORAL at 18:33

## 2022-10-26 RX ADMIN — DILTIAZEM HYDROCHLORIDE 5 MG/HR: 5 INJECTION INTRAVENOUS at 12:28

## 2022-10-26 RX ADMIN — APIXABAN 5 MG: 5 TABLET, FILM COATED ORAL at 18:33

## 2022-10-26 NOTE — TELEPHONE ENCOUNTER
Litzy called from [facility], requesting a call back from on call provider, regarding pt's low bp   Provider paged via TC  Low bp radha pressure 80/60 standing hr 134

## 2022-10-26 NOTE — ASSESSMENT & PLAN NOTE
Wt Readings from Last 3 Encounters:   10/26/22 83 5 kg (184 lb)   10/25/22 83 5 kg (184 lb)   10/11/22 84 2 kg (185 lb 10 oz)   appears compensated  Completed torsemide now stable off

## 2022-10-26 NOTE — H&P
[place]  H&P- Alton Hallman 1960, 60 y o  male MRN: [id number]  Unit/Bed#: ED 03 Encounter: [id number]  Primary Care Provider: Patel Mujica DO   Date and time admitted to hospital: 10/26/2022 10:53 AM    HLD (hyperlipidemia)  Assessment & Plan  On statin    S/P MVR (mitral valve repair)  Assessment & Plan  Cont asa   Resume lopressor per cards    Chronic heart failure with preserved ejection fraction (HFpEF) (HCC)  Assessment & Plan  Wt Readings from Last 3 Encounters:   10/26/22 83 5 kg (184 lb)   10/25/22 83 5 kg (184 lb)   10/11/22 84 2 kg (185 lb 10 oz)   appears compensated  Completed torsemide now stable off        Atrial flutter with rapid ventricular response (HCC)  Assessment & Plan  On dilt gtt w/ improvement of rates  NPO AM for CV  Eliquis started by cards  Will be considered for amiodarone post [facility]        Chief Complaint   Patient presents with   • Rapid Heart Rate     Pt reports that he woke up @ 0430 with rapid heart rate via pulse ox  HPI:  Alton Hallman is a 60 y o  male who presents  with feelings of malaise  Patient states that this morning he checked his pulse ox and was noted to have very elevated heart rates  He denies shortness of breath  He denies chest pain  However he does not quite feel well and does not feel himself  He is notably post op by CT surgery for mitral valve repair  Patient was noted to have a flutter with RVR and started on diltiazem drip  We are being asked to manage the patient further  Historical Information   Past Medical History:   Diagnosis Date   • ASD (atrial septal defect)     s/p repair   • Hyperlipidemia    • Severe mitral regurgitation     s/p repair     Past Surgical History:   Procedure Laterality Date   • ATRIAL SEPTECTOMY  10/7/2022    Procedure: REPAIR ATRIAL SEPTAL DEFECT (ASD);   Surgeon: Jose Raul Pedroza MD;  Location: [facility];  Service: Cardiac Surgery   • CARDIAC CATHETERIZATION N/A 10/7/2022    Procedure: CARDIAC CATHETERIZATION;  Surgeon: Roxie Cruz DO;  Location: [facility]; Service: Cardiology   • CARDIAC CATHETERIZATION N/A 10/7/2022    Procedure: Cardiac Coronary Angiogram;  Surgeon: Roxie Cruz DO;  [facility]; Service: Cardiology   • CARDIAC CATHETERIZATION N/A 10/7/2022    Procedure: Cardiac iabp; Surgeon: Roxie Cruz DO;  [facility]; Service: Cardiology   • INGUINAL HERNIA REPAIR Left     unsure mesh placement   • OK REPLACEMENT OF MITRAL VALVE N/A 10/7/2022    Procedure: MITRAL VALVE REPAIR WITH 34MM PHYSIO II ANNULOPLASTY RING;  Surgeon: Marek Beckett MD;  Location: [facility];  Service: Cardiac Surgery     Social History   Social History     Substance and Sexual Activity   Alcohol Use Not Currently     Social History     Substance and Sexual Activity   Drug Use Not Currently     Social History     Tobacco Use   Smoking Status Never Smoker   Smokeless Tobacco Never Used     History reviewed  No pertinent family history      Meds/Allergies   No Known Allergies    Meds:    Current Facility-Administered Medications:   •  apixaban (ELIQUIS) tablet 5 mg, 5 mg, Oral, BID, Madai Banegas MD  •  [START ON 10/27/2022] aspirin tablet 325 mg, 325 mg, Oral, Daily, Naun Jimenes PA-C  •  atorvastatin (LIPITOR) tablet 40 mg, 40 mg, Oral, Daily With Dinner, Maribel Philippe PA-C    Current Outpatient Medications:   •  acetaminophen (TYLENOL) 325 mg tablet, Take 2 tablets (650 mg total) by mouth every 6 (six) hours as needed for mild pain, Disp: 30 tablet, Rfl: 0  •  Ascorbic Acid (vitamin C) 1000 MG tablet, every 24 hours, Disp: , Rfl:   •  aspirin 325 mg tablet, Take 1 tablet (325 mg total) by mouth daily Do not start before October 12, 2022 , Disp: 30 tablet, Rfl: 3  •  atorvastatin (LIPITOR) 40 mg tablet, Take 1 tablet (40 mg total) by mouth daily with dinner, Disp: 30 tablet, Rfl: 3  •  Cyanocobalamin (Vitamin B-12) 500 MCG LOZG, every 24 hours, Disp: , Rfl:   •  Lysine 1000 MG TABS, , Disp: , Rfl:   •  metoprolol tartrate (LOPRESSOR) 50 mg tablet, Take 1 tablet (50 mg total) by mouth every 12 (twelve) hours, Disp: 60 tablet, Rfl: 3  •  Multiple Vitamin (MULTI-VITAMIN DAILY PO), , Disp: , Rfl:   •  Omega-3 Fatty Acids (Fish Oil) 500 MG CAPS, Every 12 hours, Disp: , Rfl:   •  omeprazole (PriLOSEC OTC) 20 MG tablet, Take 1 tablet (20 mg total) by mouth daily, Disp: 30 tablet, Rfl: 0    (Not in a hospital admission)        Review of Systems:    A complete and comprehensive 14 point organ system review was performed and all other systems are negative other than stated above in the HPI    Current Vitals:   Blood Pressure: 99/65 (10/26/22 1741)  Pulse: 72 (10/26/22 1741)  Temperature: 97 8 °F (36 6 °C) (10/26/22 1100)  Temp Source: Temporal (10/26/22 1100)  Respirations: 16 (10/26/22 1741)  Height: 6' 1" (185 4 cm) (10/26/22 1010)  Weight - Scale: 83 5 kg (184 lb) (10/26/22 1010)  SpO2: 98 % (10/26/22 1741)  SPO2 RA Rest    Flowsheet Row ED from 10/26/2022 in  [address] [facility]   SpO2 98 %   SpO2 Activity At Rest   O2 Device None (Room air)   O2 Flow Rate --          Intake/Output Summary (Last 24 hours) at 10/26/2022 1748  Last data filed at 10/26/2022 1221  Gross per 24 hour   Intake 500 ml   Output --   Net 500 ml     Body mass index is 24 28 kg/m²       Physical Exam:     General: well appearing, no acute distress  HEENT: atraumatic, PERRLA, moist mucosa, normal pharynx, normal tonsils and adenoids, normal tongue, no fluid in sinuses  Neck: Trachea midline, no carotid bruit, no masses  Respiratory: normal chest wall expansion, CTA B, no r/r/w, no rubs  Cardiovascular: tachycardia  Abdomen: Soft, non-tender, non-distended, normal bowel sounds in all quadrants, no hepatosplenomegaly, no tympany  Rectal: deferred  Musculoskeletal: normal ROM in upper and lower extremities  Integumentary: warm, dry, and pink, with no rash, purpura, or petechia  Heme/Lymph: no lymphadenopathy, no bruises  Neurological: Cranial Nerves II-XII grossly intact; no focal deficits in sensation or strength, A  x O x 3  Psychiatric: cooperative with normal mood, affect, and cognition    Lab Results:   CBC:   Lab Results   Component Value Date    WBC 8 81 10/26/2022    HGB 12 6 10/26/2022    HCT 37 9 10/26/2022    MCV 90 10/26/2022     (H) 10/26/2022    MCH 29 8 10/26/2022    MCHC 33 2 10/26/2022    RDW 12 5 10/26/2022    MPV 10 5 10/26/2022    NRBC 0 10/26/2022     CMP:  Lab Results   Component Value Date     10/26/2022    CO2 27 10/26/2022    CO2 23 10/07/2022    BUN 14 10/26/2022    CREATININE 1 18 10/26/2022    GLUCOSE 164 (H) 10/07/2022    CALCIUM 9 1 10/26/2022    AST 20 10/26/2022    ALT 38 10/26/2022    ALKPHOS 124 (H) 10/26/2022    EGFR 65 10/26/2022     No results found for: TROPONINI, CKMB, CKTOTAL  Coagulation:   Lab Results   Component Value Date    INR 1 36 (H) 10/07/2022    Urinalysis:  Lab Results   Component Value Date    COLORU Light Yellow 10/07/2022    CLARITYU Clear 10/07/2022    SPECGRAV 1 033 (H) 10/07/2022    PHUR 5 0 10/07/2022    LEUKOCYTESUR Negative 10/07/2022    NITRITE Negative 10/07/2022    GLUCOSEU Negative 10/07/2022    KETONESU Negative 10/07/2022    BILIRUBINUR Negative 10/07/2022    BLOODU Small (A) 10/07/2022      Amylase: No results found for: AMYLASE  Lipase: No results found for: LIPASE     Imaging: XR chest portable    Result Date: 10/9/2022  Narrative: CHEST INDICATION:   post-op CTS  COMPARISON:  CXR 10/8/2022 and chest CT 10/7/2022  EXAM PERFORMED/VIEWS:  XR CHEST PORTABLE FINDINGS:  ET tube and NG tube removed  Right jugular catheter in upper SVC  Pulmonary artery catheter in main pulmonary artery  Balloon pump 4 cm below the arch  Normal heart size, MVR  Temporary epicardial pacemaker wires  Two mediastinal drains  Sternal wires well aligned   Improving asymmetric pulmonary edema, greater on the right  No effusion or pneumothorax  Osseous structures appear within normal limits for patient age  Impression: Improving asymmetric pulmonary edema, greater on the right  Workstation performed: [id number]     XR chest portable    Result Date: 10/8/2022  Narrative: CHEST INDICATION:   MVR 10/7/2022  COMPARISON:  CXR and CT 10/7/2022  EXAM PERFORMED/VIEWS:  XR CHEST PORTABLE FINDINGS:  ET tube 4 cm above the charly  NG tube below the diaphragm  Right jugular pulmonary artery catheter in main pulmonary artery  Right jugular catheter in upper SVC  Balloon pump 3 5 cm below the arch  Normal heart size  MVR  Sternal wires well aligned  Two mediastinal drains  Temporary epicardial pacemaker wires  Minimal improvement in severe asymmetric pulmonary edema, greater on the right  Probable small effusions  No pneumothorax  Osseous structures appear within normal limits for patient age  Impression: Status post MVR  Minimal improvement in severe asymmetric pulmonary edema, greater on the right  Probable small effusions  Workstation performed: [id number]     XR chest portable    Result Date: 10/7/2022  Narrative: CHEST INDICATION:   left lung field rales, mild hypoxemia, cough  COMPARISON:  None EXAM PERFORMED/VIEWS:  XR CHEST PORTABLE FINDINGS: Cardiomediastinal silhouette appears unremarkable  Multifocal bilateral lung opacity  No definite effusion or pneumothorax  Osseous structures appear within normal limits for patient age  Impression: Multifocal bilateral lung opacity which could be due to edema and/or pneumonia  Workstation performed: [id number]     XR chest 2 views    Result Date: 10/26/2022  Narrative: CHEST INDICATION:   Chest Pain  COMPARISON:  10/9/2022 EXAM PERFORMED/VIEWS:  XR CHEST PA & LATERAL Images: 3 FINDINGS: Previous right-sided infiltrates have resolved Sternal wires and mitral valve replacement again evident Cardiomediastinal silhouette appears unremarkable   The lungs are clear  No pneumothorax or pleural effusion  Osseous structures appear within normal limits for patient age  Impression: Status post mitral valve replacement No acute cardiopulmonary disease  Previous right-sided infiltrates have resolved Workstation performed: [id number]     CT chest wo contrast    Result Date: 10/7/2022  Narrative: CT CHEST WITHOUT IV CONTRAST INDICATION:   Sepsis Pneumonia, effusion or abscess suspected, xray done Abnormal xray - interstitial infiltrates PNA  COMPARISON:  None  TECHNIQUE: CT examination of the chest was performed without intravenous contrast  Axial, sagittal, and coronal 2D reformatted images were created from the source data and submitted for interpretation  Radiation dose length product (DLP) for this visit:  247 mGy-cm   This examination, like all CT scans performed in the [facility], was performed utilizing techniques to minimize radiation dose exposure, including the use of iterative reconstruction and automated exposure control  FINDINGS: LUNGS:  Extensive right greater than left groundglass and alveolar opacities are identified  PLEURA:  Small right greater than left pleural effusions are present  HEART/GREAT VESSELS: Heart is unremarkable for patient's age  No thoracic aortic aneurysm  MEDIASTINUM AND DALILA:  Unremarkable  CHEST WALL AND LOWER NECK:  Unremarkable  VISUALIZED STRUCTURES IN THE UPPER ABDOMEN:  Unremarkable  OSSEOUS STRUCTURES:  No acute fracture or destructive osseous lesion  Impression: Extensive right greater than left groundglass and alveolar opacities are identified  Findings again may reflect multi lobar pneumonia or pulmonary edema or combination of both  Small right greater than left pleural effusions   Workstation performed: [id number]     Cardiac catheterization    Result Date: 10/7/2022  Narrative: · No angiographic evidence of CAD      FLIP Anesthesia    Result Date: 10/7/2022  Narrative: Bruce Molina MD     10/7/2022  8:05 PM Procedure Performed: FLIP Anesthesia Start Time: Preanesthesia Checklist Patient identified, IV assessed, risks and benefits discussed, monitors and equipment assessed, procedure being performed at surgeon's request and anesthesia consent obtained  Procedure  Type of FLIP: complete FLIP with interpretation  Images Saved: ultrasound permanent image saved  Location performed: OR  Intubated  Heart visualized  Insertion of FLIP Probe:  Easy  Probe Type:  Epiaortic and multiplane  Modalities:  Color flow mapping, 3D, pulse wave Doppler and continuous wave Doppler  Echocardiographic and Doppler Measurements PREPROCEDURE LEFT VENTRICLE: Systolic Function: normal   Cavity size: dilated  Cavity Dimension: 6 cm  RIGHT VENTRICLE: Systolic Function: normal   Cavity size mildly dilated  AORTIC VALVE: Leaflets: normal and trileaflet  Regurgitation: trace  MITRAL VALVE: Leaflets: myxomatous  Leaflet Motions: flail  Regurgitation: severe  Specific leaflet segments with abnormal motions are described in the following comments: p2 shaded to p1 flail  TRICUSPID VALVE: Leaflets: normal  Leaflet Motions: normal   Regurgitation: mild  OTHER VALVE FINDINGS: Myxomatous MV with flail P2 segment with possible contribution of P1; abhilash-medially directed torrential MR  ASCENDING AORTA: Size:  normal   Dissection not present  AORTIC ARCH: Size:  normal   dissection not present  DESCENDING AORTA: Size: normal   Dissection not present  OTHER AORTIC FINDINGS:  IABP approx 8 cm distal to arch/desc junction  RIGHT ATRIUM:  No spontaneous echo contrast  LEFT ATRIUM: Size: dilated  LEFT ATRIAL APPENDAGE:  No spontaneous echo contrast OTHER ATRIAL FINDINGS: Atrial septal bowing towards RA  EPIAORTIC: Plaque Thickness: 0-5 mm  OTHER FINDINGS: Pericardium:  normal  Pleural Effusion:  left and small/moderate  POSTPROCEDURE LEFT VENTRICLE: Unchanged   Ejection Fraction: 60 %   Cavity Size: normal  RIGHT VENTRICLE: Systolic Function: normal  Cavity Size: mildly dilated  AORTIC VALVE: Unchanged   MITRAL VALVE: Leaflets: ring  Regurgitation: trace  Mean Gradient: 2  TRICUSPID VALVE: Unchanged   Regurgitation: mild  OTHER VALVE FINDINGS: S/p ring mitral annuloplasty; well seated; trace early systolic MR; no BILLY  ATRIA: Unchanged   OTHER ATRIAL FINDINGS: PFO noted during surgical procedure and closed; small residual PFO with R-L flow noted, reviewed with JRF  AORTA: Unchanged   Dissection: Dissection not present  OTHER AORTA FINDINGS: Balloon well seated  REMOVAL: Probe Removal: atraumatic  XR chest portable ICU    Result Date: 10/8/2022  Narrative: CHEST INDICATION:   S/P open heart  MVR 10/7/2022  COMPARISON:  CXR and CT 10/7/2022  EXAM PERFORMED/VIEWS:  XR CHEST PORTABLE ICU FINDINGS:  ET tube 5 cm above the charly  Right jugular catheter in SVC  Right jugular pulmonary artery catheter in main pulmonary artery  Balloon pump 5 cm below the arch  Normal heart size  MVR  Two mediastinal drains  Temporary epicardial pacemaker wires  Severe pulmonary edema, increased  No effusion or pneumothorax  Osseous structures appear within normal limits for patient age  Impression: Status post MVR Worsening pulmonary edema, severe  Workstation performed: [id number]     Echo complete w/ contrast if indicated    Result Date: 10/7/2022  Narrative: •  Left Ventricle: Left ventricular cavity size is normal  Wall thickness is normal  There is mild asymmetric hypertrophy of the basal septal wall  The left ventricular ejection fraction is 70% by visual estimation  Systolic function is hyperdynamic  Wall motion is normal  •  Mitral Valve: The posterior leaflet is flail with about 11 mm gap to anterior leaflet  A small segment of a torn chorda tendinea is visualized  The valve does not appear significantly thickened or myxomatous  There is severe regurgitation  Acute severe MR due to flail posterior leaflet   There is no torn papillary seen  A small segment of chorda tendinea is appreciated suggesting ruptured chorda as etiology  The valve is not significant myxomatous  No LV wall motion abnormalities are seen  Findings communicated to primary team directly  EKG, Pathology, and Other Studies: I have personally reviewed the results  VTE   Prophylaxis: In place    Code Status: Prior    Anticipated Length of Stay:  Patient will be admitted on an Inpatient basis with an anticipated length of stay of  greater 2 midnights  Counseling / Coordination of Care  Total floor / unit time spent today 52 minutes  Greater than 50% of total time was spent with the patient and / or family counseling and / or coordination of care       "This note has been constructed using a voice recognition system"      Luly Aguilar MD  10/26/2022, 5:48 PM

## 2022-10-26 NOTE — ASSESSMENT & PLAN NOTE
On dilt gtt w/ improvement of rates  NPO AM for CV  Eliquis started by cards  Will be considered for amiodarone post TEECV

## 2022-10-26 NOTE — TELEPHONE ENCOUNTER
2nd Request- Patients last blood pressure reading was  80/60 standing hr 134- Patient of Dr Brooke Parekh

## 2022-10-26 NOTE — TELEPHONE ENCOUNTER
Spoke to Beckie via TT - please have pt present to ER for evaluation  Called, spoke to pt & wife  They are agreeable to ER eval - pt stated wife will transport to [facility] ER  Reminded pt to call 911 for any change in sx for ambulance transport  Pt and wife verbalized understanding  Called to [facility] ER to advise of arrival - appx 30 mins ETA

## 2022-10-26 NOTE — CONSULTS
Consult - Cardiology   Andrew Mcclellan 60 y o  male MRN: [id number]  Unit/Bed#: ED 03 Encounter: [id number]      Reason For Consult:  New onset atrial flutter            ASSESSMENT:  1  New onset atrial flutter   A  HRs initially in the 130s but responding to Cardizem gtt in the ED - HRs now 70s   B  CHADS2 Vasc score is 1? 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               A  10/7/2022- S/P ASD and MV repair w/ #34mm [employer] II ring              B  On ASA 325mg QD and Lopressor 50mg BID   3  Recent onset HFpEF               A  Secondary to #1 - completed Torsemide/KCl x7d post-op              B  Baseline wt- 185lbs   4  Hyperlipidemia               A  FLP 10/7/2022 - total chol 245, triglycerides 77, ,               B  Placed on Lipitor 40mg QD                 Diagnostics  Cardiac catheterization 10/7/2022: No angiographic evidence of CAD  TTE 10/7/2022: EF 70%, MV posterior leaflet is flail with a 11mm gap to anterior leaflet with severe MR, torn chordae visualized  EKG 10/8/2022: NSR, early repolarization      PLAN:  · Case to be discussed with CTS Dr Catalina Kim regarding recommendations  · Currently on Cardizem gtt for goal HR<100, hold for SBP<90  · Patient agreeable for FLIP/DCCV, if needed, to establish NSR  · His [id number] score is currently 1 (chf)  · Lopressor on hold given hypotension   • Continue  mg and atorvastatin 40 mg daily    History Of Present Illness:  Andrew Mcclellan is a 23-year-old male with history of acute severe MR s/p MVR, ASD s/p repair, HFpEF, hyperlipidemia who presents to [address] [place] [address] ED with complaint of palpitations which began at 4:30 a m  Kevin Naranjo Patient reports he awoke to use the bathroom when he started feeling his HR which lasted several hours  He took his blood pressures which were around 80/60  He was advised to come to the ED for further evaluation    On telemetry showed new onset atrial flutter confirmed by EKG with heart rates in the 130s to 140s  He received IV Cardizem bolus initially with heart rates coming down to 120s but ultimately started on titratable Cardizem drip  His blood pressures have been stable in the 45R to 90 systolic  Heart rates have come down to the 70s to 80s but still in atrial flutter  Patient states he is feeling better and no longer feels palpitations at this point  Denies any dizziness/lightheadedness syncope or presyncopal symptoms  Past Medical History:        Past Medical History:   Diagnosis Date   • ASD (atrial septal defect)     s/p repair   • Hyperlipidemia    • Severe mitral regurgitation     s/p repair      Past Surgical History:   Procedure Laterality Date   • ATRIAL SEPTECTOMY  10/7/2022    Procedure: REPAIR ATRIAL SEPTAL DEFECT (ASD); Surgeon: Ajay Andres MD;  Location: [facility];  Service: Cardiac Surgery   • CARDIAC CATHETERIZATION N/A 10/7/2022    Procedure: CARDIAC CATHETERIZATION;  Surgeon: Mireille Gallagher DO;  Location: [facility]; Service: Cardiology   • CARDIAC CATHETERIZATION N/A 10/7/2022    Procedure: Cardiac Coronary Angiogram;  Surgeon: Mireille Gallagher DO;  [facility]; Service: Cardiology   • CARDIAC CATHETERIZATION N/A 10/7/2022    Procedure: Cardiac iabp; Surgeon: Mireille Gallagher DO;  Location: [facility]; Service: Cardiology   • INGUINAL HERNIA REPAIR Left     unsure mesh placement   • DC REPLACEMENT OF MITRAL VALVE N/A 10/7/2022    Procedure: MITRAL VALVE REPAIR WITH 34MM PHYSIO II ANNULOPLASTY RING;  Surgeon: Ajay Andres MD;  Location: [facility];  Service: Cardiac Surgery        Allergy:        No Known Allergies    Medications:       Prior to Admission medications    Medication Sig Start Date End Date Taking?  Authorizing Provider   acetaminophen (TYLENOL) 325 mg tablet Take 2 tablets (650 mg total) by mouth every 6 (six) hours as needed for mild pain 10/11/22   Madai Poole PA-C   Ascorbic Acid (vitamin C) 1000 MG tablet every 24 hours    Historical Provider, MD   aspirin 325 mg tablet Take 1 tablet (325 mg total) by mouth daily Do not start before October 12, 2022  10/12/22   Sacha Saab PA-C   atorvastatin (LIPITOR) 40 mg tablet Take 1 tablet (40 mg total) by mouth daily with dinner 10/11/22   Madai Poole PA-C   Cyanocobalamin (Vitamin B-12) 500 MCG LOZG every 24 hours    Historical Provider, MD   Lysine 1000 MG TABS     Historical Provider, MD   metoprolol tartrate (LOPRESSOR) 50 mg tablet Take 1 tablet (50 mg total) by mouth every 12 (twelve) hours 10/11/22   Sacha Saab PA-C   Multiple Vitamin (MULTI-VITAMIN DAILY PO)     Historical Provider, MD   Omega-3 Fatty Acids (Fish Oil) 500 MG CAPS Every 12 hours    Historical Provider, MD   omeprazole (PriLOSEC OTC) 20 MG tablet Take 1 tablet (20 mg total) by mouth daily 10/11/22   Sacha Saab PA-C       Family History:     History reviewed  No pertinent family history       Social History:       Social History     Socioeconomic History   • Marital status: /Civil Union     Spouse name: None   • Number of children: None   • Years of education: None   • Highest education level: None   Occupational History   • None   Tobacco Use   • Smoking status: Never Smoker   • Smokeless tobacco: Never Used   Vaping Use   • Vaping Use: Never used   Substance and Sexual Activity   • Alcohol use: Not Currently   • Drug use: Not Currently   • Sexual activity: None   Other Topics Concern   • None   Social History Narrative   • None     Social Determinants of Health     Financial Resource Strain: Not on file   Food Insecurity: No Food Insecurity   • Worried About Running Out of Food in the Last Year: Never true   • Ran Out of Food in the Last Year: Never true   Transportation Needs: Not on file   Physical Activity: Not on file   Stress: Not on file   Social Connections: Not on file Intimate Partner Violence: Not on file   Housing Stability: Low Risk    • Unable to Pay for Housing in the Last Year: No   • Number of Places Lived in the Last Year: 1   • Unstable Housing in the Last Year: No       ROS:  14 point ROS negative except as outlined above  Remainder review of systems is negative    Exam:  General:  alert, oriented and in no distress, cooperative  Head: Normocephalic, atraumatic  Eyes:  EOMI  Pupils - equal, round, reactive to accomodation  No icterus  Normal Conjunctiva  Oropharynx: moist and normal-appearing mucosa  Neck: supple, symmetrical, trachea midline and no JVD  Heart:  Irregularly irregular, tachycardic  Respiratory effort / Chest Inspection: unlabored  Lungs:  normal air entry, lungs clear to auscultation and no rales, rhonchi or wheezing   Abdomen: flat, normal findings: bowel sounds normal and soft, non-tender  Lower Limbs:  no pitting edema  Pulses[de-identified]  RLE - DP: present 2+                 LLE - DP: present 2+  Musculoskeletal: ROM grossly normal         Weights: Wt Readings from Last 3 Encounters:   10/26/22 83 5 kg (184 lb)   10/25/22 83 5 kg (184 lb)   10/11/22 84 2 kg (185 lb 10 oz)   , Body mass index is 24 28 kg/m²           Lab Studies:             Results from last 7 days   Lab Units 10/26/22  1035   WBC Thousand/uL 8 81   HEMOGLOBIN g/dL 12 6   HEMATOCRIT % 37 9   PLATELETS Thousands/uL 425*   ,   Results from last 7 days   Lab Units 10/26/22  1035   POTASSIUM mmol/L 4 3   CHLORIDE mmol/L 105   CO2 mmol/L 27   BUN mg/dL 14   CREATININE mg/dL 1 18   CALCIUM mg/dL 9 1   ALK PHOS U/L 124*   ALT U/L 38   AST U/L 20

## 2022-10-27 ENCOUNTER — ANESTHESIA EVENT (INPATIENT)
Dept: NON INVASIVE DIAGNOSTICS | Facility: HOSPITAL | Age: 62
End: 2022-10-27
Payer: COMMERCIAL

## 2022-10-27 ENCOUNTER — ANESTHESIA (INPATIENT)
Dept: NON INVASIVE DIAGNOSTICS | Facility: HOSPITAL | Age: 62
End: 2022-10-27
Payer: COMMERCIAL

## 2022-10-27 ENCOUNTER — APPOINTMENT (INPATIENT)
Dept: NON INVASIVE DIAGNOSTICS | Facility: HOSPITAL | Age: 62
End: 2022-10-27
Attending: HOSPITALIST
Payer: COMMERCIAL

## 2022-10-27 LAB
ALBUMIN SERPL BCP-MCNC: 3.3 G/DL (ref 3.5–5)
ALP SERPL-CCNC: 138 U/L (ref 46–116)
ALT SERPL W P-5'-P-CCNC: 33 U/L (ref 12–78)
ANION GAP SERPL CALCULATED.3IONS-SCNC: 6 MMOL/L (ref 4–13)
AST SERPL W P-5'-P-CCNC: 16 U/L (ref 5–45)
ATRIAL RATE: 300 BPM
ATRIAL RATE: 326 BPM
ATRIAL RATE: 90 BPM
BILIRUB SERPL-MCNC: 0.9 MG/DL (ref 0.2–1)
BUN SERPL-MCNC: 11 MG/DL (ref 5–25)
CALCIUM ALBUM COR SERPL-MCNC: 9.9 MG/DL (ref 8.3–10.1)
CALCIUM SERPL-MCNC: 9.3 MG/DL (ref 8.3–10.1)
CHLORIDE SERPL-SCNC: 106 MMOL/L (ref 96–108)
CO2 SERPL-SCNC: 26 MMOL/L (ref 21–32)
CREAT SERPL-MCNC: 1.18 MG/DL (ref 0.6–1.3)
ERYTHROCYTE [DISTWIDTH] IN BLOOD BY AUTOMATED COUNT: 12.7 % (ref 11.6–15.1)
GFR SERPL CREATININE-BSD FRML MDRD: 65 ML/MIN/1.73SQ M
GLUCOSE SERPL-MCNC: 109 MG/DL (ref 65–140)
HCT VFR BLD AUTO: 41.8 % (ref 36.5–49.3)
HGB BLD-MCNC: 14 G/DL (ref 12–17)
MCH RBC QN AUTO: 29.5 PG (ref 26.8–34.3)
MCHC RBC AUTO-ENTMCNC: 33.5 G/DL (ref 31.4–37.4)
MCV RBC AUTO: 88 FL (ref 82–98)
P AXIS: -84 DEGREES
P AXIS: -87 DEGREES
P AXIS: 66 DEGREES
PLATELET # BLD AUTO: 461 THOUSANDS/UL (ref 149–390)
PMV BLD AUTO: 10.8 FL (ref 8.9–12.7)
POTASSIUM SERPL-SCNC: 4.2 MMOL/L (ref 3.5–5.3)
PR INTERVAL: 156 MS
PROT SERPL-MCNC: 6.9 G/DL (ref 6.4–8.4)
QRS AXIS: -1 DEGREES
QRS AXIS: 17 DEGREES
QRS AXIS: 18 DEGREES
QRSD INTERVAL: 78 MS
QRSD INTERVAL: 80 MS
QRSD INTERVAL: 86 MS
QT INTERVAL: 362 MS
QT INTERVAL: 384 MS
QT INTERVAL: 396 MS
QTC INTERVAL: 428 MS
QTC INTERVAL: 442 MS
QTC INTERVAL: 456 MS
RBC # BLD AUTO: 4.74 MILLION/UL (ref 3.88–5.62)
SL CV LV EF: 65
SODIUM SERPL-SCNC: 138 MMOL/L (ref 135–147)
T WAVE AXIS: 56 DEGREES
T WAVE AXIS: 60 DEGREES
T WAVE AXIS: 66 DEGREES
TSH SERPL DL<=0.05 MIU/L-ACNC: 5.41 UIU/ML (ref 0.45–4.5)
VENTRICULAR RATE: 75 BPM
VENTRICULAR RATE: 80 BPM
VENTRICULAR RATE: 90 BPM
WBC # BLD AUTO: 9.75 THOUSAND/UL (ref 4.31–10.16)

## 2022-10-27 PROCEDURE — 99232 SBSQ HOSP IP/OBS MODERATE 35: CPT | Performed by: HOSPITALIST

## 2022-10-27 PROCEDURE — 93005 ELECTROCARDIOGRAM TRACING: CPT

## 2022-10-27 PROCEDURE — 93312 ECHO TRANSESOPHAGEAL: CPT

## 2022-10-27 PROCEDURE — 84443 ASSAY THYROID STIM HORMONE: CPT | Performed by: HOSPITALIST

## 2022-10-27 PROCEDURE — 5A2204Z RESTORATION OF CARDIAC RHYTHM, SINGLE: ICD-10-PCS | Performed by: INTERNAL MEDICINE

## 2022-10-27 PROCEDURE — 85027 COMPLETE CBC AUTOMATED: CPT | Performed by: HOSPITALIST

## 2022-10-27 PROCEDURE — 80053 COMPREHEN METABOLIC PANEL: CPT | Performed by: HOSPITALIST

## 2022-10-27 PROCEDURE — 36415 COLL VENOUS BLD VENIPUNCTURE: CPT | Performed by: HOSPITALIST

## 2022-10-27 PROCEDURE — 92960 CARDIOVERSION ELECTRIC EXT: CPT

## 2022-10-27 RX ORDER — SENNOSIDES 8.6 MG
1 TABLET ORAL DAILY
Status: DISCONTINUED | OUTPATIENT
Start: 2022-10-27 | End: 2022-10-28 | Stop reason: HOSPADM

## 2022-10-27 RX ORDER — SODIUM CHLORIDE 9 MG/ML
INJECTION, SOLUTION INTRAVENOUS CONTINUOUS PRN
Status: DISCONTINUED | OUTPATIENT
Start: 2022-10-27 | End: 2022-10-27

## 2022-10-27 RX ORDER — LIDOCAINE HYDROCHLORIDE 10 MG/ML
INJECTION, SOLUTION EPIDURAL; INFILTRATION; INTRACAUDAL; PERINEURAL AS NEEDED
Status: DISCONTINUED | OUTPATIENT
Start: 2022-10-27 | End: 2022-10-27

## 2022-10-27 RX ORDER — METOPROLOL SUCCINATE 25 MG/1
25 TABLET, EXTENDED RELEASE ORAL DAILY
Status: DISCONTINUED | OUTPATIENT
Start: 2022-10-27 | End: 2022-10-27

## 2022-10-27 RX ORDER — DOCUSATE SODIUM 100 MG/1
100 CAPSULE, LIQUID FILLED ORAL 2 TIMES DAILY
Status: DISCONTINUED | OUTPATIENT
Start: 2022-10-27 | End: 2022-10-28 | Stop reason: HOSPADM

## 2022-10-27 RX ORDER — METOPROLOL SUCCINATE 25 MG/1
25 TABLET, EXTENDED RELEASE ORAL DAILY
Status: DISCONTINUED | OUTPATIENT
Start: 2022-10-27 | End: 2022-10-28 | Stop reason: HOSPADM

## 2022-10-27 RX ORDER — ACETAMINOPHEN 325 MG/1
650 TABLET ORAL EVERY 6 HOURS PRN
Status: DISCONTINUED | OUTPATIENT
Start: 2022-10-27 | End: 2022-10-28 | Stop reason: HOSPADM

## 2022-10-27 RX ORDER — AMIODARONE HYDROCHLORIDE 200 MG/1
200 TABLET ORAL 2 TIMES DAILY WITH MEALS
Status: DISCONTINUED | OUTPATIENT
Start: 2022-10-27 | End: 2022-10-28 | Stop reason: HOSPADM

## 2022-10-27 RX ORDER — HEPARIN SODIUM 5000 [USP'U]/ML
5000 INJECTION, SOLUTION INTRAVENOUS; SUBCUTANEOUS EVERY 8 HOURS SCHEDULED
Status: DISCONTINUED | OUTPATIENT
Start: 2022-10-27 | End: 2022-10-27

## 2022-10-27 RX ORDER — PROPOFOL 10 MG/ML
INJECTION, EMULSION INTRAVENOUS AS NEEDED
Status: DISCONTINUED | OUTPATIENT
Start: 2022-10-27 | End: 2022-10-27

## 2022-10-27 RX ORDER — PANTOPRAZOLE SODIUM 40 MG/1
40 TABLET, DELAYED RELEASE ORAL
Status: DISCONTINUED | OUTPATIENT
Start: 2022-10-27 | End: 2022-10-28 | Stop reason: HOSPADM

## 2022-10-27 RX ADMIN — APIXABAN 5 MG: 5 TABLET, FILM COATED ORAL at 09:20

## 2022-10-27 RX ADMIN — LIDOCAINE HYDROCHLORIDE 50 MG: 10 INJECTION, SOLUTION EPIDURAL; INFILTRATION; INTRACAUDAL; PERINEURAL at 15:18

## 2022-10-27 RX ADMIN — APIXABAN 5 MG: 5 TABLET, FILM COATED ORAL at 17:13

## 2022-10-27 RX ADMIN — ASPIRIN 325 MG ORAL TABLET 325 MG: 325 PILL ORAL at 09:20

## 2022-10-27 RX ADMIN — PANTOPRAZOLE SODIUM 40 MG: 40 TABLET, DELAYED RELEASE ORAL at 07:07

## 2022-10-27 RX ADMIN — DOCUSATE SODIUM 100 MG: 100 CAPSULE, LIQUID FILLED ORAL at 09:21

## 2022-10-27 RX ADMIN — AMIODARONE HYDROCHLORIDE 200 MG: 200 TABLET ORAL at 17:13

## 2022-10-27 RX ADMIN — PROPOFOL 20 MG: 10 INJECTION, EMULSION INTRAVENOUS at 15:23

## 2022-10-27 RX ADMIN — PROPOFOL 120 MG: 10 INJECTION, EMULSION INTRAVENOUS at 15:18

## 2022-10-27 RX ADMIN — ATORVASTATIN CALCIUM 40 MG: 40 TABLET, FILM COATED ORAL at 17:13

## 2022-10-27 RX ADMIN — SODIUM CHLORIDE: 9 INJECTION, SOLUTION INTRAVENOUS at 14:57

## 2022-10-27 RX ADMIN — SENNOSIDES 8.6 MG: 8.6 TABLET, FILM COATED ORAL at 09:21

## 2022-10-27 RX ADMIN — B-COMPLEX W/ C & FOLIC ACID TAB 1 TABLET: TAB at 09:21

## 2022-10-27 RX ADMIN — PROPOFOL 30 MG: 10 INJECTION, EMULSION INTRAVENOUS at 15:21

## 2022-10-27 RX ADMIN — METOPROLOL SUCCINATE 25 MG: 25 TABLET, EXTENDED RELEASE ORAL at 17:12

## 2022-10-27 RX ADMIN — DOCUSATE SODIUM 100 MG: 100 CAPSULE, LIQUID FILLED ORAL at 17:17

## 2022-10-27 RX ADMIN — PROPOFOL 30 MG: 10 INJECTION, EMULSION INTRAVENOUS at 15:26

## 2022-10-27 NOTE — PROGRESS NOTES
Cardiology Progress Note   Chinmay Bermudez 60 y o  male MRN: [id number]    Unit/Bed#: ED 03 Encounter: [id number]      Assessment:  1  New onset atrial flutter              A  HR initially 130s - placed on IV Cardizem gtt - Hrs improved  at rest              B  CHADS2 Vasc score is 1? 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               A  10/7/2022- S/P ASD and MV repair w/ #34mm [place]              H  On ASA 325mg QD and Lopressor 50mg BID   3  Recent onset HFpEF               A  Secondary to #1 - completed Torsemide/KCl x7d post-op              B  Baseline wt- 185lbs   4  Hyperlipidemia               A  [address] 10/7/2022 - total chol 245, triglycerides 77, ,               B  Placed on Lipitor 40mg QD     Diagnostics  Cardiac catheterization 10/7/2022: No angiographic evidence of CAD  TTE 10/7/2022: EF 70%, MV posterior leaflet is flail with a 11mm gap to anterior leaflet with severe MR, torn chordae visualized  EKG 10/8/2022: NSR, early repolarization    Plan/Discussion  · Case discussed with CTS Dr Michela Guzman - proceed with planned FLIP/DCCV today to establish NSR  He was started on Eliquis 5mg BID yesterday evening in prep for cardioversion  · Likely switch IV Cardizem to Amiodarone post-DCCV  · Will discuss with attending if ASA 325mg still needed or can switch to low-dose ASA while on Eliquis  · Continue atorvastatin 40mg QD     Subjective:   Patient seen and examined  Overnight events reviewed  Patient denies any acute complaints at this time  HR 90s at rest > 130s with exertion  Mostly asymptomatic with brief episodes of palpitations  Objective:     Vitals: Blood pressure 101/71, pulse 78, temperature 97 8 °F (36 6 °C), temperature source Temporal, resp  rate 14, height 6' 1" (1 854 m), weight 83 5 kg (184 lb), SpO2 96 %  , Body mass index is 24 28 kg/m² ,   Orthostatic Blood Pressures Flowsheet Row Most Recent Value   Blood Pressure 101/71 filed at 10/27/2022 1100   Patient Position - Orthostatic VS Lying filed at 10/26/2022 1833            Intake/Output Summary (Last 24 hours) at 10/27/2022 1137  Last data filed at 10/27/2022 4404  Gross per 24 hour   Intake 620 ml   Output 1200 ml   Net -580 ml     Physical Exam:  GEN: Brennan Oleary appears well, alert and oriented x 3, pleasant and cooperative   HEENT: Sclera anicteric, conjunctivae pink, mucous membranes moist  Oropharynx clear  NECK: supple, no significant JVD, Trachea midline, no thyromegaly  HEART: +tachycardic, normal S1 and S2, no murmurs, clicks, gallops or rubs   LUNGS: clear to auscultation bilaterally; no wheezes, rales, or rhonchi  No increased work of breathing or signs of respiratory distress  ABDOMEN: Soft, nontender, nondistended  EXTREMITIES: Skin warm and well perfused, no clubbing, cyanosis, or edema  NEURO: No focal findings  Normal speech  Mood and affect normal    SKIN: Normal without suspicious lesions on exposed skin      Medications:    Current Facility-Administered Medications:   •  acetaminophen (TYLENOL) tablet 650 mg, 650 mg, Oral, Q6H PRN, Doe Randolph MD  •  apixaban (ELIQUIS) tablet 5 mg, 5 mg, Oral, BID, Doe Randolph MD, 5 mg at 10/27/22 1078  •  aspirin tablet 325 mg, 325 mg, Oral, Daily, Doe Randolph MD, 325 mg at 10/27/22 0920  •  atorvastatin (LIPITOR) tablet 40 mg, 40 mg, Oral, Daily With Cyrus Goode MD, 40 mg at 10/26/22 1833  •  diltiazem (CARDIZEM) 125 mg in sodium chloride 0 9 % 125 mL infusion, 1-15 mg/hr, Intravenous, Titrated, Sang Sykes PA-C, Last Rate: 2 5 mL/hr at 10/27/22 1054, 2 5 mg/hr at 10/27/22 1054  •  docusate sodium (COLACE) capsule 100 mg, 100 mg, Oral, BID, Doe Randolph MD, 100 mg at 10/27/22 4644  •  multivitamin stress formula tablet 1 tablet, 1 tablet, Oral, Daily, Doe Randolph MD, 1 tablet at 10/27/22 8089  • pantoprazole (PROTONIX) EC tablet 40 mg, 40 mg, Oral, Early Morning, Garold Duverney, MD, 40 mg at 10/27/22 0707  •  senna (SENOKOT) tablet 8 6 mg, 1 tablet, Oral, Daily, Garold Duverney, MD, 8 6 mg at 10/27/22 5519    Current Outpatient Medications:   •  acetaminophen (TYLENOL) 325 mg tablet, Take 2 tablets (650 mg total) by mouth every 6 (six) hours as needed for mild pain, Disp: 30 tablet, Rfl: 0  •  Ascorbic Acid (vitamin C) 1000 MG tablet, every 24 hours, Disp: , Rfl:   •  aspirin 325 mg tablet, Take 1 tablet (325 mg total) by mouth daily Do not start before October 12, 2022 , Disp: 30 tablet, Rfl: 3  •  atorvastatin (LIPITOR) 40 mg tablet, Take 1 tablet (40 mg total) by mouth daily with dinner, Disp: 30 tablet, Rfl: 3  •  Cyanocobalamin (Vitamin B-12) 500 MCG LOZG, every 24 hours, Disp: , Rfl:   •  Lysine 1000 MG TABS, , Disp: , Rfl:   •  metoprolol tartrate (LOPRESSOR) 50 mg tablet, Take 1 tablet (50 mg total) by mouth every 12 (twelve) hours, Disp: 60 tablet, Rfl: 3  •  Multiple Vitamin (MULTI-VITAMIN DAILY PO), , Disp: , Rfl:   •  Omega-3 Fatty Acids (Fish Oil) 500 MG CAPS, Every 12 hours, Disp: , Rfl:   •  omeprazole (PriLOSEC OTC) 20 MG tablet, Take 1 tablet (20 mg total) by mouth daily, Disp: 30 tablet, Rfl: 0     Labs & Results:      Results from last 7 days   Lab Units 10/27/22  0718 10/26/22  1035   WBC Thousand/uL 9 75 8 81   HEMOGLOBIN g/dL 14 0 12 6   HEMATOCRIT % 41 8 37 9   PLATELETS Thousands/uL 461* 425*         Results from last 7 days   Lab Units 10/27/22  0718 10/26/22  1035   POTASSIUM mmol/L 4 2 4 3   CHLORIDE mmol/L 106 105   CO2 mmol/L 26 27   BUN mg/dL 11 14   CREATININE mg/dL 1 18 1 18   CALCIUM mg/dL 9 3 9 1   ALK PHOS U/L 138* 124*   ALT U/L 33 38   AST U/L 16 20

## 2022-10-27 NOTE — PROGRESS NOTES
[facility]  Progress Note - Andrew Mcclellan 1960, 60 y o  male MRN: [id number]  Unit/Bed#: ED 03 Encounter: [id number]  Primary Care Provider: Juan F Denise DO   Date and time admitted to hospital: 10/26/2022 10:53 AM    HLD (hyperlipidemia)  Assessment & Plan  On statin    S/P MVR (mitral valve repair)  Assessment & Plan  Cont asa   Resume lopressor per cards    Chronic heart failure with preserved ejection fraction (HFpEF) (HCC)  Assessment & Plan  Wt Readings from Last 3 Encounters:   10/26/22 83 5 kg (184 lb)   10/25/22 83 5 kg (184 lb)   10/11/22 84 2 kg (185 lb 10 oz)   appears compensated  Completed torsemide now stable off        Atrial flutter with rapid ventricular response (HCC)  Assessment & Plan  On dilt gtt w/ improvement of rates  For CV today  Observe on tele after CV   Eliquis started by cards  Will be considered for amiodarone post TEECV       VTE  Prophylaxis:   Pharmacologic: in place    Patient Centered Rounds: I have performed bedside rounds with nursing staff today  Discussions with Specialists or Other Care Team Provider: case management    Education and Discussions with Family / Patient: pt    Current Length of Stay: 1 day(s)    Current Patient Status: Inpatient        Code Status: Level 1 - Full Code      Subjective:   Pt seen  Remains on dilt gtt, borderline tachy  Feels ok     Patient is seen and examined at bedside  All other ROS are negative  Objective:     Vitals:   No data recorded  HR:  [] 92  Resp:  [12-22] 18  BP: ()/(53-95) 96/74  SpO2:  [95 %-99 %] 97 %  Body mass index is 24 28 kg/m²  Input and Output Summary (last 24 hours):        Intake/Output Summary (Last 24 hours) at 10/27/2022 1253  Last data filed at 10/27/2022 0925  Gross per 24 hour   Intake 120 ml   Output 1200 ml   Net -1080 ml       Physical Exam:       GEN: No acute distress, comfortable  HEEENT: No JVD, PERRLA, no scleral icterus  RESP: Lungs clear to auscultation bilaterally  CV: tachycardia  ABD: SOFT NON TENDER, POSITIVE BOWEL SOUNDS, NO DISTENTION  PSYCH: CALM  NEURO: A X O X 3, NO FOCAL DEFICITS  SKIN: NO RASH  EXTREM: NO EDEMA    Additional Data:     Labs:    Results from last 7 days   Lab Units 10/27/22  0718 10/26/22  1035   WBC Thousand/uL 9 75 8 81   HEMOGLOBIN g/dL 14 0 12 6   HEMATOCRIT % 41 8 37 9   PLATELETS Thousands/uL 461* 425*   NEUTROS PCT %  --  69   LYMPHS PCT %  --  22   MONOS PCT %  --  7   EOS PCT %  --  1     Results from last 7 days   Lab Units 10/27/22  0718   SODIUM mmol/L 138   POTASSIUM mmol/L 4 2   CHLORIDE mmol/L 106   CO2 mmol/L 26   BUN mg/dL 11   CREATININE mg/dL 1 18   ANION GAP mmol/L 6   CALCIUM mg/dL 9 3   ALBUMIN g/dL 3 3*   TOTAL BILIRUBIN mg/dL 0 90   ALK PHOS U/L 138*   ALT U/L 33   AST U/L 16   GLUCOSE RANDOM mg/dL 109                           * I Have Reviewed All Lab Data Listed Above  Imaging:     Results for orders placed during the hospital encounter of 10/07/22    XR chest portable    Narrative  CHEST    INDICATION:   post-op CTS  COMPARISON:  CXR 10/8/2022 and chest CT 10/7/2022  EXAM PERFORMED/VIEWS:  XR CHEST PORTABLE      FINDINGS:  ET tube and NG tube removed  Right jugular catheter in upper SVC  Pulmonary artery catheter in main pulmonary artery  Balloon pump 4 cm below the arch  Normal heart size, MVR  Temporary epicardial pacemaker wires  Two mediastinal drains  Sternal wires well aligned  Improving asymmetric pulmonary edema, greater on the right  No effusion or pneumothorax  Osseous structures appear within normal limits for patient age  Impression  Improving asymmetric pulmonary edema, greater on the right  Workstation performed: [id number]    Results for orders placed during the hospital encounter of 10/26/22    XR chest 2 views    Narrative  CHEST    INDICATION:   Chest Pain      COMPARISON:  10/9/2022    EXAM PERFORMED/VIEWS:  XR CHEST PA & LATERAL  Images: 3    FINDINGS:  Previous right-sided infiltrates have resolved    Sternal wires and mitral valve replacement again evident    Cardiomediastinal silhouette appears unremarkable  The lungs are clear  No pneumothorax or pleural effusion  Osseous structures appear within normal limits for patient age  Impression  Status post mitral valve replacement    No acute cardiopulmonary disease  Previous right-sided infiltrates have resolved        Workstation performed: [id number]      *I have reviewed all imaging reports listed above      Recent Cultures (last 7 days):           Last 24 Hours Medication List:   Current Facility-Administered Medications   Medication Dose Route Frequency Provider Last Rate   • acetaminophen  650 mg Oral Q6H PRN Almas Mckenzie MD     • apixaban  5 mg Oral BID Almas Mckenzie MD     • aspirin  325 mg Oral Daily Almas Mckenzie MD     • atorvastatin  40 mg Oral Daily With Kyra Cason MD     • diltiazem  1-15 mg/hr Intravenous Titrated Mani Lung, PA-C 2 5 mg/hr (10/27/22 1054)   • docusate sodium  100 mg Oral BID Almas Mckenzie MD     • multivitamin stress formula  1 tablet Oral Daily Almas Mckenzie MD     • pantoprazole  40 mg Oral Early Morning Almas Mckenzie MD     • senna  1 tablet Oral Daily Almas Mckenzie MD          Today, Patient Was Seen By: Almas Mckenzie    ** Please Note: Dictation voice to text software may have been used in the creation of this document   **

## 2022-10-27 NOTE — CASE MANAGEMENT
Case Management Assessment & Discharge Planning Note    Patient name Sugey Has  Location ED 03/ED 03 MRN [id number]  : 1960 Date 10/27/2022       Current Admission Date: 10/26/2022  Current Admission Diagnosis:Atrial flutter with rapid ventricular response [facility])   Patient Active Problem List    Diagnosis Date Noted   • Atrial flutter with rapid ventricular response ([address] ) 10/26/2022   • Chronic heart failure with preserved ejection fraction (HFpEF) ([address] ) 10/26/2022   • S/P MVR (mitral valve repair) 10/26/2022   • HLD (hyperlipidemia) 10/26/2022   • Acute blood loss as cause of postoperative anemia 10/11/2022   • Severe sepsis ([address] ) 10/07/2022   • Multifocal pneumonia 10/07/2022   • High anion gap metabolic acidosis    • Elevated troponin 10/07/2022   • Elevated d-dimer 10/07/2022   • CON (acute kidney injury) ([address] ) 10/07/2022   • Severe mitral regurgitation 10/07/2022   • Acute mitral valve rupture ([address] ) 10/07/2022   • Cardiogenic shock ([address] ) 10/07/2022   • Transaminitis 10/07/2022   • History of repair of congenital atrial septal defect (ASD) 10/07/2022      LOS (days): 1  Geometric Mean LOS (GMLOS) (days):   Days to GMLOS:     OBJECTIVE:  PATIENT READMITTED TO HOSPITAL  Risk of Unplanned Readmission Score: 12 26         Current admission status: Inpatient       Preferred Pharmacy:   [address] [facility]  [address] [facility] [address]  Phone: [phone] Fax: [phone]    Primary Care Provider: Cecilia Schulz DO    Primary Insurance: BLUE CROSS  Secondary Insurance:     ASSESSMENT:  Abilio Munroe Proxies    There are no active Health Care Proxies on file         Advance Directives  Does patient have a Health Care POA?: Yes  Does patient have Advance Directives?: Yes  Advance Directives: Living will, Power of  for health care  Primary Contact: Miles Floor         Readmission Root Cause  30 Day Readmission: Yes  Who directed you to return to the hospital?: Self  Did you understand whom to contact if you had questions or problems?: Yes  Did you get your prescriptions before you left the hospital?: Yes  Were you able to get your prescriptions filled when you left the hospital?: Yes  Did you take your medications as prescribed?: Yes  Were you able to get to your follow-up appointments?: Yes  During previous admission, was a post-acute recommendation made?: Yes  What post-acute resources were offered?: [facility]  Patient was readmitted due to: Atrial flutter  Action Plan: Evaluate and treat  Started on Eliquis  poss FLIP    Patient Information  Admitted from[de-identified] Home  Mental Status: Alert  During Assessment patient was accompanied by: Not accompanied during assessment  Assessment information provided by[de-identified] Patient  Primary Caregiver: Self  Support Systems: Spouse/significant other  [place] of Residence: [address] [place] [address] do you live in?: [address] [place] [address] entry access options   Select all that apply : No steps to enter home  Type of Current Residence: [facility]  In the last 12 months, was there a time when you were not able to pay the mortgage or rent on time?: No  In the last 12 months, how many places have you lived?: 1  In the last 12 months, was there a time when you did not have a steady place to sleep or slept in a shelter (including now)?: No  Homeless/housing insecurity resource given?: N/A  Living Arrangements: Lives w/ Spouse/significant other  Is patient a ?: No    Activities of Daily Living Prior to Admission  Functional Status: Independent  Completes ADLs independently?: Yes  Ambulates independently?: Yes  Does patient use assisted devices?: No  Does patient currently own DME?: Yes  What DME does the patient currently own?: Bedside Commode, Walker, Wheelchair  Does patient have a history of Outpatient Therapy (PT/OT)?: No  Does the patient have a history of Short-Term Rehab?: No  Does patient have a history of HHC?: Yes  Does patient currently have [facility]?: Yes    Current Home Health Care  Type of Current Home Care Services: Nurse visit  Current Home Health Agency[de-identified] Other (please enter name in comment) [de-identified])  [facility] Provider[de-identified] PCP    Patient Information Continued  Income Source: Pension/long-term  Does patient have prescription coverage?: Yes  Within the past 12 months, you worried that your food would run out before you got the money to buy more : Never true  Within the past 12 months, the food you bought just didn't last and you didn't have money to get more : Never true  Food insecurity resource given?: N/A  Does patient receive dialysis treatments?: No  Does patient have a history of substance abuse?: No  Does patient have a history of Mental Health Diagnosis?: No         Means of Transportation  Means of Transport to Appts[de-identified] Family transport  In the past 12 months, has lack of transportation kept you from medical appointments or from getting medications?: No  In the past 12 months, has lack of transportation kept you from meetings, work, or from getting things needed for daily living?: No  Was application for public transport provided?: N/A        DISCHARGE DETAILS:    Discharge planning discussed with[de-identified] Patient  Freedom of Choice: Yes  Comments - Freedom of Choice: discussed resuming [address] [facility]  CM contacted family/caregiver?: No- see comments (pt alert and indpt in room)  Were Treatment Team discharge recommendations reviewed with patient/caregiver?: Yes  Did patient/caregiver verbalize understanding of patient care needs?: Yes       Contacts  Reason/Outcome: Discharge Planning, Referral    Requested [email or web address]         Is the patient interested in [facility] at discharge?: Yes  [address] requested[de-identified] [address] Name[de-identified] Other Srini Stoddard)  [address] Provider[de-identified] PCP  Home Health Services Needed[de-identified] Evaluate Functional Status and Safety, Heart Failure Management, Post-Op Care and Assessment  Homebound Criteria Met[de-identified] Requires the Assistance of Another Person for Safe Ambulation or to Leave the Home  Supporting Clincal Findings[de-identified] Limited Endurance    DME Referral Provided  Referral made for DME?: No    Other Referral/Resources/Interventions Provided:  Interventions: HHC, Prescription Price Check  Referral Comments: Pt is interested in reusming care with [facility]  He will also likely need an Eliquis price check  Discharge Destination Plan[de-identified] Home with [place] at Discharge : Family                                      Additional Comments: Cm met with pt in ED  Pt reports that he was recently at [facility] and was dc'd on 10/11 with [facility]  Pt states that he is ambualtory  He owns a walker and wc from his father but he does not use either  Pt is current with [facility]  he is interested in a resumption of care for SN due to this episode that brought him into the ED  Resumption of care sent  Pt states that he is not driving at this time due to his Cardiac concerns  He thinks he will not be allowed to drive for another few weeks  Pts wife Iona Bernal is his POA  Pharmacy is [facility] and PCP is Dr Lisa Addison  Pt was started on Eliquis  Will need price check

## 2022-10-27 NOTE — ANESTHESIA POSTPROCEDURE EVALUATION
Post-Op Assessment Note    CV Status:  Stable  Pain Score: 0    Pain management: adequate     Mental Status:  Sleepy   Hydration Status:  Stable   PONV Controlled:  None   Airway Patency:  Patent      Post Op Vitals Reviewed: Yes      Staff: CRNA         No complications documented      BP   106/60   Temp      Pulse 86   Resp   20   SpO2   97

## 2022-10-27 NOTE — UTILIZATION REVIEW
Initial Clinical Review    Admission: Date/Time/Statement:   Admission Orders (From admission, onward)     Ordered        10/27/22 0116  INPATIENT ADMISSION  Once                      Orders Placed This Encounter   Procedures   • INPATIENT ADMISSION     Standing Status:   Standing     Number of Occurrences:   1     Order Specific Question:   Level of Care     Answer:   Level 2 Stepdown / HOT [14]     Order Specific Question:   Estimated length of stay     Answer:   More than 2 Midnights     Order Specific Question:   Certification     Answer:   I certify that inpatient services are medically necessary for this patient for a duration of greater than two midnights  See H&P and MD Progress Notes for additional information about the patient's course of treatment  ED Arrival Information     Expected   -    Arrival   10/26/2022 10:06    Acuity   Emergent            Means of arrival   Walk-In    Escorted by   Family Member    Service   Hospitalist    Admission type   Emergency            Arrival complaint   Low blood pressure dizzness           Chief Complaint   Patient presents with   • Rapid Heart Rate     Pt reports that he woke up @ 0430 with rapid heart rate via pulse ox  Initial Presentation: 60 y o  male from home to ED 10/26/22 and inpatient order placed 10/27/22 due to atrial flutter with rapid ventricular response  PMH on 10/7/2022- S/P ASD and MV repair w/ #34mm Pat Coronel, recent CHF  Presented due to rapid heart rate starting morning of arrival about 5 5 hours prior to arrival when check pulse ox  On exam tachycardic  Hs Tnl 0   Ecg atrial flutter with 2:1 AV conduction  In the ED given IVF bolus, Cardizem IV bolus and infusion, asa  Plan is telemetry, continue IV Cardizem infusion, Eliquis started  Consult Cardiology  10/26/22 per Cardiology- Patient is in atrial flutter, recent MV repair about 3 weeks go  BP low and limits medication    Plan is FLIP guided cardioversion  Start Eliquis  Cardizem infusion for goal HF+R< 10, hold for SBP < 90  Lopressor on hold  Continue asa and atorvastatin  On 10/27/22:  Remains in atrial flutter  Rates at rest 90 to > 130 with exertion  Intermittent palpitations  On exam:  Tachycardic  Continue Cardizem gtt  For FLIP/DCCV  Done and cardioverted to sinus  Amiodarone started  Telemetry continues  Date: 10/28/22    Day 2: no complaints  Telemetry sinus  On exam no focal deficits  To continue telemetry  amiodarorone started post cardioversion and to continue       ED Triage Vitals [10/26/22 1010]   Temperature Pulse Respirations Blood Pressure SpO2   97 9 °F (36 6 °C) (!) 140 18 112/76 100 %      Temp Source Heart Rate Source Patient Position - Orthostatic VS BP Location FiO2 (%)   Temporal Monitor Sitting Right arm --      Pain Score       No Pain          Wt Readings from Last 1 Encounters:   10/28/22 79 kg (174 lb 2 6 oz)     Additional Vital Signs:   10/28/22 0748 97 9 °F (36 6 °C) 88 18 109/73 85 94 % None (Room air) Sitting   10/28/22 0638 97 9 °F (36 6 °C) 89 16 126/80 98 97 % None (Room air) Lying   10/28/22 0028 98 1 °F (36 7 °C) 92 16 120/74 91 95 % None (Room air) Lying   10/27/22 1620 98 3 °F (36 8 °C) 90 16 114/74 88 98 % None (Room air) --   10/27/22 1600 -- 88 17 102/67 79 97 % None (Room air) --   10/27/22 1545 -- 90 15 101/56 71 96 % None (Room air) --   10/27/22 1539 -- 91 19 -- -- -- None (Room air) --   10/27/22 15:02:23 -- 148 Abnormal  18 118/88 -- 99 %       10/27/22 0915 -- 144 Abnormal  19 122/56 78 97 % -- --   10/27/22 0900 -- 116 Abnormal  17 102/74 83 97 % -- --   10/27/22 0745 -- 102 12 97/70 78 97 % -- --   10/27/22 0645 -- 77 14 109/69 84 96 % -- --   10/27/22 0400 -- 74 12 113/65 82 96 % None (Room air) --   10/27/22 0300 -- 73 12 109/63 77 95 % None (Room air) --   10/27/22 0200 -- 73 14 114/65 84 96 % None (Room air) --   10/27/22 0000 -- 73 16 100/71 80 97 % None (Room air) -- 10/26/22 1833 -- 105 18 101/59 77 97 % None (Room air) Lying   10/26/22 1741 -- 72 16 99/65 77 98 % None (Room air) Lying   10/26/22 1610 -- 101 15 106/60 78 97 % None (Room air) Lying   10/26/22 1505 -- 73 18 96/56 -- 97 % None (Room air) Lying   10/26/22 1400 -- 90 15 86/53 Abnormal  65 98 % None (Room air) Lying   10/26/22 1356 -- -- -- 101/56 -- -- -- --   10/26/22 1354 -- -- -- 82/53 Abnormal  -- -- -- --   10/26/22 1346 -- 135 Abnormal  18 93/64 -- 98 % None (Room air) Lying   10/26/22 1313 -- 142 Abnormal  20 123/95 98 98 % None (Room air) Lying   10/26/22 1208 -- 139 Abnormal  20 94/67 75 99 % None (Room air) Lying   10/26/22 1100 97 8 °F (36 6 °C) 137 Abnormal  18 99/67 77 98 % None (Room air)        Pertinent Labs/Diagnostic Test Results:   XR chest 2 views   Final Result by Scarlette Brunner, MD (10/26 1101)   Status post mitral valve replacement      No acute cardiopulmonary disease  Previous right-sided infiltrates have resolved            Workstation performed: [id number]           10/26/22 ecg - Atrial flutter with 2:1 A-V conduction  Nonspecific ST abnormality  Abnormal ECG  When compared with ECG of 08-OCT-2022 04:12,  Atrial flutter has replaced Sinus rhythm  Vent  rate has increased BY  46 BPM    10/27/22 ecg - Atrial flutter with variable A-V block  Junctional ST depression, probably normal  Abnormal ECG  When compared with ECG of 26-OCT-2022 15:11, (unconfirmed)  No significant change was found    10/27/22 FLIP Left Ventricle: Left ventricular cavity size is normal  Wall thickness is normal  The left ventricular ejection fraction is 65%  Systolic function is normal  Wall motion is normal   •  Right Ventricle: Right ventricular cavity size is normal  Systolic function is normal   •  Atrial Septum: There is a small and patent foramen ovale confirmed at rest with predominant left to right shunting using color Doppler  •  Left Atrial Appendage: There is normal function   There is no thrombus  •  Mitral Valve: The valve has been repaired with an annular ring  There are some remaining flail chordae prolapsing into the left atrium through the mitral valve  There is mild to moderate regurgitation  •  Tricuspid Valve:  There is mild regurgitation    10/27/22 Cardioversion - The patient was cardioverted to sinus rhythm with a synchronized 200J biphasic shock    10/27/22 ecg Normal sinus rhythm  Normal ECG  When compared with ECG of 27-OCT-2022 01:56, (unconfirmed)  Sinus rhythm has replaced Atrial flutter  ST no longer depressed in Inferior leads  Results from last 7 days   Lab Units 10/28/22  0501 10/27/22  0718 10/26/22  1035   WBC Thousand/uL 8 92 9 75 8 81   HEMOGLOBIN g/dL 13 1 14 0 12 6   HEMATOCRIT % 38 8 41 8 37 9   PLATELETS Thousands/uL 372 461* 425*   NEUTROS ABS Thousands/µL 5 45  --  6 06     Results from last 7 days   Lab Units 10/28/22  0501 10/27/22  0718 10/26/22  1035   SODIUM mmol/L 141 138 139   POTASSIUM mmol/L 4 5 4 2 4 3   CHLORIDE mmol/L 106 106 105   CO2 mmol/L 28 26 27   ANION GAP mmol/L 7 6 7   BUN mg/dL 17 11 14   CREATININE mg/dL 1 23 1 18 1 18   EGFR ml/min/1 73sq m 62 65 65   CALCIUM mg/dL 8 9 9 3 9 1     Results from last 7 days   Lab Units 10/28/22  0501 10/27/22  0718 10/26/22  1035   AST U/L 12 16 20   ALT U/L 32 33 38   ALK PHOS U/L 132* 138* 124*   TOTAL PROTEIN g/dL 6 6 6 9 6 8   ALBUMIN g/dL 3 2* 3 3* 3 2*   TOTAL BILIRUBIN mg/dL 0 90 0 90 0 60     Results from last 7 days   Lab Units 10/28/22  0501 10/27/22  0718 10/26/22  1035   GLUCOSE RANDOM mg/dL 93 109 137     Results from last 7 days   Lab Units 10/26/22  1453 10/26/22  1250 10/26/22  1035   HS TNI 0HR ng/L  --   --  10   HS TNI 2HR ng/L  --  11  --    HSTNI D2 ng/L  --  1  --    HS TNI 4HR ng/L 13  --   --    HSTNI D4 ng/L 3  --   --      Results from last 7 days   Lab Units 10/27/22  0718 10/26/22  1250   TSH 3RD GENERATON uIU/mL 5 411* 3 733       ED Treatment:   Medication Administration from 10/26/2022 1005 to 10/27/2022 1002       Date/Time Order Dose Route Action Comments     10/26/2022 1139 sodium chloride 0 9 % bolus 500 mL 500 mL Intravenous New Bag      10/26/2022 1206 diltiazem (CARDIZEM) injection 10 mg 10 mg Intravenous Given      10/26/2022 1355 diltiazem (CARDIZEM) 125 mg in sodium chloride 0 9 % 125 mL infusion 9 mg/hr Intravenous Rate/Dose Change BP decreasing, per ED attending, decrease gtt     10/26/2022 1346 diltiazem (CARDIZEM) 125 mg in sodium chloride 0 9 % 125 mL infusion 11 5 mg/hr Intravenous Rate/Dose Change      10/26/2022 1313 diltiazem (CARDIZEM) 125 mg in sodium chloride 0 9 % 125 mL infusion 9 mg/hr Intravenous Rate/Dose Change      10/26/2022 1251 diltiazem (CARDIZEM) 125 mg in sodium chloride 0 9 % 125 mL infusion 7 5 mg/hr Intravenous Rate/Dose Change      10/26/2022 1228 diltiazem (CARDIZEM) 125 mg in sodium chloride 0 9 % 125 mL infusion 5 mg/hr Intravenous New Bag      10/27/2022 0920 aspirin tablet 325 mg 325 mg Oral Given      10/26/2022 1833 atorvastatin (LIPITOR) tablet 40 mg 40 mg Oral Given      10/27/2022 0707 pantoprazole (PROTONIX) EC tablet 40 mg 40 mg Oral Given      10/27/2022 0921 multivitamin stress formula tablet 1 tablet 1 tablet Oral Given      10/27/2022 0921 docusate sodium (COLACE) capsule 100 mg 100 mg Oral Given      10/27/2022 0921 senna (SENOKOT) tablet 8 6 mg 8 6 mg Oral Given      10/27/2022 0920 apixaban (ELIQUIS) tablet 5 mg 5 mg Oral Given      10/26/2022 1833 apixaban (ELIQUIS) tablet 5 mg 5 mg Oral Given      10/27/2022 0918 diltiazem (CARDIZEM) 125 mg in sodium chloride 0 9 % 125 mL infusion 5 mg/hr Intravenous Rate/Dose Change      10/27/2022 0153 diltiazem (CARDIZEM) 125 mg in sodium chloride 0 9 % 125 mL infusion 2 5 mg/hr Intravenous Rate/Dose Change      10/27/2022 0135 diltiazem (CARDIZEM) 125 mg in sodium chloride 0 9 % 125 mL infusion 5 mg/hr Intravenous Rate/Dose Change      10/27/2022 0120 diltiazem (CARDIZEM) 125 mg in sodium chloride 0 9 % 125 mL infusion 7 5 mg/hr Intravenous Rate/Dose Change         Past Medical History:   Diagnosis Date   • ASD (atrial septal defect)     s/p repair   • Hyperlipidemia    • Severe mitral regurgitation     s/p repair     Present on Admission:  **None**      Admitting Diagnosis: Atrial flutter ([facility] ) [I48 92]  Atrial flutter with rapid ventricular response ([facility] [address] ) [I48 92]  Age/Sex: 60 y o  male  Admission Orders:  10/27/22 0116 inpatient   Scheduled Medications:  apixaban, 5 mg, Oral, BID  aspirin, 325 mg, Oral, Daily  atorvastatin, 40 mg, Oral, Daily With Dinner  docusate sodium, 100 mg, Oral, BID  multivitamin stress formula, 1 tablet, Oral, Daily  pantoprazole, 40 mg, Oral, Early Morning  senna, 1 tablet, Oral, Daily    amiodarone tablet 200 mg  Dose: 200 mg  Freq: 2 times daily with meals Route: PO  Start: 10/27/22 1630    Continuous IV Infusions:  diltiazem, 1-15 mg/hr, Intravenous, Titrated - dc 10/27/22 at 1534  PRN Meds:  acetaminophen, 650 mg, Oral, Q6H PRN    IP CONSULT TO CARDIOLOGY    Network Utilization Review Department  ATTENTION: Please call with any questions or concerns to [phone] and carefully listen to the prompts so that you are directed to the right person  All voicemails are confidential   Ruthy Carranza all requests for admission clinical reviews, approved or denied determinations and any other requests to dedicated fax number below belonging to the campus where the patient is receiving treatment   List of dedicated fax numbers for the Facilities:  [address] DENIALS (Administrative/Medical Necessity) [phone]   [address] (Maternity/NICU/Pediatrics) [address] [place] [address] [phone]   [address] [place] [address] [facility] [place] [phone]   [address] [facility] [address] [address] [address]i Velas[address] [facility] [address] [facility] [phone]

## 2022-10-27 NOTE — ANESTHESIA PREPROCEDURE EVALUATION
Procedure:  FLIP    Relevant Problems   ANESTHESIA (within normal limits)      CARDIO   (+) Acute mitral valve rupture (HCC)   (+) Atrial flutter with rapid ventricular response (HCC)   (+) HLD (hyperlipidemia)   (+) S/P MVR (mitral valve repair)   (+) Severe mitral regurgitation      /RENAL   (+) CON (acute kidney injury) (HCC)      HEMATOLOGY   (+) Acute blood loss as cause of postoperative anemia      PULMONARY   (+) Multifocal pneumonia      Labs:   Results from last 7 days   Lab Units 10/27/22  0718 10/26/22  1035   WBC Thousand/uL 9 75 8 81   HEMOGLOBIN g/dL 14 0 12 6   HEMATOCRIT % 41 8 37 9   PLATELETS Thousands/uL 461* 425*   NEUTROS PCT %  --  69   MONOS PCT %  --  7     Results from last 7 days   Lab Units 10/27/22  0718 10/26/22  1035   SODIUM mmol/L 138 139   POTASSIUM mmol/L 4 2 4 3   CHLORIDE mmol/L 106 105   CO2 mmol/L 26 27   ANION GAP mmol/L 6 7   BUN mg/dL 11 14   CREATININE mg/dL 1 18 1 18   EGFR ml/min/1 73sq m 65 65   CALCIUM mg/dL 9 3 9 1   GLUCOSE RANDOM mg/dL 109 137   ALT U/L 33 38   AST U/L 16 20   ALK PHOS U/L 138* 124*   ALBUMIN g/dL 3 3* 3 2*   TOTAL BILIRUBIN mg/dL 0 90 0 60                Physical Exam    Airway    Mallampati score: I  TM Distance: >3 FB  Neck ROM: full     Dental   No notable dental hx     Cardiovascular  Rhythm: irregular, Rate: normal,     Pulmonary  Pulmonary exam normal     Other Findings        Anesthesia Plan  ASA Score- 4     Anesthesia Type- IV sedation with anesthesia with ASA Monitors  Additional Monitors:   Airway Plan:     Comment: I discussed risks (reviewed with patient on the anesthesia consent form), benefits and alternatives for General Anesthesia  These risks did include breathing tube remaining in place if not strong enough, PONV, damage to lips and teeth, sore throat, eye injury or blindness, risk of heart attack or stroke that may lead to death          Plan Factors-    Chart reviewed  EKG reviewed  Existing labs reviewed   Patient summary reviewed  Induction- intravenous  Postoperative Plan- Plan for postoperative opioid use  Informed Consent- Anesthetic plan and risks discussed with patient  I personally reviewed this patient with the CRNA  Discussed and agreed on the Anesthesia Plan with the CRNA  Wilmer Kelly

## 2022-10-27 NOTE — ASSESSMENT & PLAN NOTE
On dilt gtt w/ improvement of rates  For CV today  Observe on tele after CV   Eliquis started by cards  Will be considered for amiodarone post TEECV

## 2022-10-28 VITALS
WEIGHT: 174.16 LBS | TEMPERATURE: 97.9 F | HEIGHT: 73 IN | RESPIRATION RATE: 18 BRPM | BODY MASS INDEX: 23.08 KG/M2 | SYSTOLIC BLOOD PRESSURE: 109 MMHG | OXYGEN SATURATION: 94 % | HEART RATE: 88 BPM | DIASTOLIC BLOOD PRESSURE: 73 MMHG

## 2022-10-28 LAB
ALBUMIN SERPL BCP-MCNC: 3.2 G/DL (ref 3.5–5)
ALP SERPL-CCNC: 132 U/L (ref 46–116)
ALT SERPL W P-5'-P-CCNC: 32 U/L (ref 12–78)
ANION GAP SERPL CALCULATED.3IONS-SCNC: 7 MMOL/L (ref 4–13)
AST SERPL W P-5'-P-CCNC: 12 U/L (ref 5–45)
BASOPHILS # BLD AUTO: 0.05 THOUSANDS/ÂΜL (ref 0–0.1)
BASOPHILS NFR BLD AUTO: 1 % (ref 0–1)
BILIRUB SERPL-MCNC: 0.9 MG/DL (ref 0.2–1)
BUN SERPL-MCNC: 17 MG/DL (ref 5–25)
CALCIUM ALBUM COR SERPL-MCNC: 9.5 MG/DL (ref 8.3–10.1)
CALCIUM SERPL-MCNC: 8.9 MG/DL (ref 8.3–10.1)
CHLORIDE SERPL-SCNC: 106 MMOL/L (ref 96–108)
CO2 SERPL-SCNC: 28 MMOL/L (ref 21–32)
CREAT SERPL-MCNC: 1.23 MG/DL (ref 0.6–1.3)
EOSINOPHIL # BLD AUTO: 0.25 THOUSAND/ÂΜL (ref 0–0.61)
EOSINOPHIL NFR BLD AUTO: 3 % (ref 0–6)
ERYTHROCYTE [DISTWIDTH] IN BLOOD BY AUTOMATED COUNT: 12.8 % (ref 11.6–15.1)
GFR SERPL CREATININE-BSD FRML MDRD: 62 ML/MIN/1.73SQ M
GLUCOSE SERPL-MCNC: 93 MG/DL (ref 65–140)
HCT VFR BLD AUTO: 38.8 % (ref 36.5–49.3)
HGB BLD-MCNC: 13.1 G/DL (ref 12–17)
IMM GRANULOCYTES # BLD AUTO: 0.03 THOUSAND/UL (ref 0–0.2)
IMM GRANULOCYTES NFR BLD AUTO: 0 % (ref 0–2)
LYMPHOCYTES # BLD AUTO: 2.42 THOUSANDS/ÂΜL (ref 0.6–4.47)
LYMPHOCYTES NFR BLD AUTO: 27 % (ref 14–44)
MCH RBC QN AUTO: 30.3 PG (ref 26.8–34.3)
MCHC RBC AUTO-ENTMCNC: 33.8 G/DL (ref 31.4–37.4)
MCV RBC AUTO: 90 FL (ref 82–98)
MONOCYTES # BLD AUTO: 0.72 THOUSAND/ÂΜL (ref 0.17–1.22)
MONOCYTES NFR BLD AUTO: 8 % (ref 4–12)
NEUTROPHILS # BLD AUTO: 5.45 THOUSANDS/ÂΜL (ref 1.85–7.62)
NEUTS SEG NFR BLD AUTO: 61 % (ref 43–75)
NRBC BLD AUTO-RTO: 0 /100 WBCS
PLATELET # BLD AUTO: 372 THOUSANDS/UL (ref 149–390)
PMV BLD AUTO: 10.6 FL (ref 8.9–12.7)
POTASSIUM SERPL-SCNC: 4.5 MMOL/L (ref 3.5–5.3)
PROT SERPL-MCNC: 6.6 G/DL (ref 6.4–8.4)
RBC # BLD AUTO: 4.32 MILLION/UL (ref 3.88–5.62)
SODIUM SERPL-SCNC: 141 MMOL/L (ref 135–147)
T4 FREE SERPL-MCNC: 1.17 NG/DL (ref 0.76–1.46)
WBC # BLD AUTO: 8.92 THOUSAND/UL (ref 4.31–10.16)

## 2022-10-28 PROCEDURE — 80053 COMPREHEN METABOLIC PANEL: CPT | Performed by: HOSPITALIST

## 2022-10-28 PROCEDURE — 84439 ASSAY OF FREE THYROXINE: CPT | Performed by: PHYSICIAN ASSISTANT

## 2022-10-28 PROCEDURE — 85025 COMPLETE CBC W/AUTO DIFF WBC: CPT | Performed by: HOSPITALIST

## 2022-10-28 RX ORDER — METOPROLOL SUCCINATE 25 MG/1
25 TABLET, EXTENDED RELEASE ORAL DAILY
Qty: 30 TABLET | Refills: 0 | Status: SHIPPED | OUTPATIENT
Start: 2022-10-29

## 2022-10-28 RX ORDER — AMIODARONE HYDROCHLORIDE 200 MG/1
200 TABLET ORAL DAILY
Qty: 30 TABLET | Refills: 0 | Status: SHIPPED | OUTPATIENT
Start: 2022-10-28 | End: 2022-11-14 | Stop reason: SDUPTHER

## 2022-10-28 RX ADMIN — METOPROLOL SUCCINATE 25 MG: 25 TABLET, EXTENDED RELEASE ORAL at 08:04

## 2022-10-28 RX ADMIN — SENNOSIDES 8.6 MG: 8.6 TABLET, FILM COATED ORAL at 08:04

## 2022-10-28 RX ADMIN — APIXABAN 5 MG: 5 TABLET, FILM COATED ORAL at 08:04

## 2022-10-28 RX ADMIN — PANTOPRAZOLE SODIUM 40 MG: 40 TABLET, DELAYED RELEASE ORAL at 05:00

## 2022-10-28 RX ADMIN — B-COMPLEX W/ C & FOLIC ACID TAB 1 TABLET: TAB at 09:00

## 2022-10-28 RX ADMIN — DOCUSATE SODIUM 100 MG: 100 CAPSULE, LIQUID FILLED ORAL at 08:04

## 2022-10-28 RX ADMIN — AMIODARONE HYDROCHLORIDE 200 MG: 200 TABLET ORAL at 08:04

## 2022-10-28 NOTE — ASSESSMENT & PLAN NOTE
Wt Readings from Last 3 Encounters:   10/28/22 79 kg (174 lb 2 6 oz)   10/25/22 83 5 kg (184 lb)   10/11/22 84 2 kg (185 lb 10 oz)   appears compensated  Completed torsemide now stable off

## 2022-10-28 NOTE — CASE MANAGEMENT
Case Management Progress Note    Patient name Mckenna Chua  Location /-01 MRN [id number]  : 1960 Date 10/28/2022       LOS (days): 2  Geometric Mean LOS (GMLOS) (days): 2 30  Days to GMLOS:0 5        OBJECTIVE:        Current admission status: Inpatient  Preferred Pharmacy:   [address]  [facility] [address]  Phone: [phone] Fax: [phone]    Primary Care Provider: Tato Manzano DO    Primary Insurance: BLUE CROSS  Secondary Insurance:     PROGRESS NOTE:  $10 copay/month Eliquis coupon given to Pt  Pt in agreement for coupon

## 2022-10-28 NOTE — ED PROVIDER NOTES
History  Chief Complaint   Patient presents with   • Rapid Heart Rate     Pt reports that he woke up @ 0430 with rapid heart rate via pulse ox  This is a 59 y/o male that presents to the eD with c/o racing heart today  Patient had AVR and ASD repair on 10/7/22  Has been doing well since that time  Saw cardiologist yesterday  No issues with visit except patient notes BP was in 90s but he was asymptomatic and they maintained metoprolol but will follow BP  Patient awoke this AM at 0430 with sensation of racing heart  Noted HR to be in 140s  BP in 90s so he did not take his AM metoprolol  No chest pain  Not lightheaded  Moderate in severity  No aggravating or alleviating factors  No other associated symptoms  No radiation  DDx: arrhythmia, electrolyte abnormality  Prior to Admission Medications   Prescriptions Last Dose Informant Patient Reported? Taking? Ascorbic Acid (vitamin C) 1000 MG tablet  Self Yes No   Sig: every 24 hours   Cyanocobalamin (Vitamin B-12) 500 MCG LOZG  Self Yes No   Sig: every 24 hours   Lysine 1000 MG TABS  Self Yes No   Multiple Vitamin (MULTI-VITAMIN DAILY PO)  Self Yes No   Omega-3 Fatty Acids (Fish Oil) 500 MG CAPS  Self Yes No   Sig: Every 12 hours   acetaminophen (TYLENOL) 325 mg tablet  Self No No   Sig: Take 2 tablets (650 mg total) by mouth every 6 (six) hours as needed for mild pain   aspirin 325 mg tablet  Self No No   Sig: Take 1 tablet (325 mg total) by mouth daily Do not start before October 12, 2022     atorvastatin (LIPITOR) 40 mg tablet  Self No No   Sig: Take 1 tablet (40 mg total) by mouth daily with dinner   metoprolol tartrate (LOPRESSOR) 50 mg tablet  Self No No   Sig: Take 1 tablet (50 mg total) by mouth every 12 (twelve) hours   omeprazole (PriLOSEC OTC) 20 MG tablet  Self No No   Sig: Take 1 tablet (20 mg total) by mouth daily      Facility-Administered Medications: None       Past Medical History:   Diagnosis Date   • ASD (atrial septal defect)     s/p repair   • Hyperlipidemia    • Severe mitral regurgitation     s/p repair       Past Surgical History:   Procedure Laterality Date   • ATRIAL SEPTECTOMY  10/7/2022    Procedure: REPAIR ATRIAL SEPTAL DEFECT (ASD); Surgeon: Ralf Alcantar MD;  Location: [facility];  Service: Cardiac Surgery   • CARDIAC CATHETERIZATION N/A 10/7/2022    Procedure: CARDIAC CATHETERIZATION;  Surgeon: Carlos Macario DO;  Location: [facility]; Service: Cardiology   • CARDIAC CATHETERIZATION N/A 10/7/2022    Procedure: Cardiac Coronary Angiogram;  Surgeon: Carlos Macario DO;  Location: [facility]; Service: Cardiology   • CARDIAC CATHETERIZATION N/A 10/7/2022    Procedure: Cardiac iabp; Surgeon: Carlos Macario DO;  Location: [facility]; Service: Cardiology   • INGUINAL HERNIA REPAIR Left     unsure mesh placement   • IA REPLACEMENT OF MITRAL VALVE N/A 10/7/2022    Procedure: MITRAL VALVE REPAIR WITH 34MM PHYSIO II ANNULOPLASTY RING;  Surgeon: Ralf Alcantar MD;  Location: [facility];  Service: Cardiac Surgery       History reviewed  No pertinent family history  I have reviewed and agree with the history as documented  E-Cigarette/Vaping   • E-Cigarette Use Never User      E-Cigarette/Vaping Substances   • Nicotine No    • THC No    • CBD No    • Flavoring No    • Other No    • Unknown No      Social History     Tobacco Use   • Smoking status: Never Smoker   • Smokeless tobacco: Never Used   Vaping Use   • Vaping Use: Never used   Substance Use Topics   • Alcohol use: Not Currently   • Drug use: Not Currently       Review of Systems   Constitutional: Negative for activity change, appetite change and fever  HENT: Negative for congestion, ear pain, rhinorrhea and sore throat  Respiratory: Negative for cough, chest tightness, shortness of breath and wheezing  Cardiovascular: Positive for palpitations  Negative for chest pain and leg swelling     Gastrointestinal: Negative for abdominal pain, diarrhea, nausea and vomiting  Endocrine: Negative for polyuria  Genitourinary: Negative for difficulty urinating, dysuria, frequency and urgency  Musculoskeletal: Negative for arthralgias and myalgias  Skin: Negative for color change and rash  Allergic/Immunologic: Negative for immunocompromised state  Neurological: Negative for dizziness, syncope and light-headedness  Hematological: Does not bruise/bleed easily  Psychiatric/Behavioral: Negative for confusion  All other systems reviewed and are negative  Physical Exam  Physical Exam  Vitals and nursing note reviewed  Constitutional:       General: He is not in acute distress  Appearance: He is well-developed  HENT:      Head: Normocephalic and atraumatic  Nose: Nose normal    Eyes:      General: No scleral icterus  Conjunctiva/sclera: Conjunctivae normal    Cardiovascular:      Rate and Rhythm: Tachycardia present  Heart sounds: Normal heart sounds  Pulmonary:      Effort: Pulmonary effort is normal  No respiratory distress  Breath sounds: Normal breath sounds  No stridor  No wheezing  Abdominal:      General: There is no distension  Palpations: Abdomen is soft  Tenderness: There is no abdominal tenderness  There is no guarding or rebound  Musculoskeletal:         General: No deformity  Cervical back: Normal range of motion and neck supple  Skin:     General: Skin is warm and dry  Findings: No rash  Neurological:      Mental Status: He is alert and oriented to person, place, and time  Psychiatric:         Thought Content:  Thought content normal          Vital Signs  ED Triage Vitals [10/26/22 1010]   Temperature Pulse Respirations Blood Pressure SpO2   97 9 °F (36 6 °C) (!) 140 18 112/76 100 %      Temp Source Heart Rate Source Patient Position - Orthostatic VS BP Location FiO2 (%)   Temporal Monitor Sitting Right arm --      Pain Score       No Pain           Vitals: 10/27/22 1620 10/28/22 0028 10/28/22 0638 10/28/22 0748   BP: 114/74 120/74 126/80 109/73   Pulse: 90 92 89 88   Patient Position - Orthostatic VS:  Lying Lying Sitting         Visual Acuity      ED Medications  Medications   atorvastatin (LIPITOR) tablet 40 mg (40 mg Oral Given 10/27/22 1713)   acetaminophen (TYLENOL) tablet 650 mg (has no administration in time range)   pantoprazole (PROTONIX) EC tablet 40 mg (40 mg Oral Given 10/28/22 0500)   multivitamin stress formula tablet 1 tablet (1 tablet Oral Given 10/28/22 0900)   docusate sodium (COLACE) capsule 100 mg (100 mg Oral Given 10/28/22 0804)   senna (SENOKOT) tablet 8 6 mg (8 6 mg Oral Given 10/28/22 0804)   apixaban (ELIQUIS) tablet 5 mg (5 mg Oral Given 10/28/22 0804)   amiodarone tablet 200 mg (200 mg Oral Given 10/28/22 0804)   metoprolol succinate (TOPROL-XL) 24 hr tablet 25 mg (25 mg Oral Given 10/28/22 0804)   sodium chloride 0 9 % bolus 500 mL (0 mL Intravenous Stopped 10/26/22 1221)   diltiazem (CARDIZEM) injection 10 mg (10 mg Intravenous Given 10/26/22 1206)   diltiazem (CARDIZEM) 125 mg in sodium chloride 0 9 % 125 mL infusion (0 mg/hr Intravenous Stopped 10/27/22 0121)       Diagnostic Studies  Results Reviewed     Procedure Component Value Units Date/Time    Comprehensive metabolic panel [[id number]]  (Abnormal) Collected: 10/28/22 0501    Lab Status: Final result Specimen: Blood from Arm, Right Updated: 10/28/22 0540     Sodium 141 mmol/L      Potassium 4 5 mmol/L      Chloride 106 mmol/L      CO2 28 mmol/L      ANION GAP 7 mmol/L      BUN 17 mg/dL      Creatinine 1 23 mg/dL      Glucose 93 mg/dL      Calcium 8 9 mg/dL      Corrected Calcium 9 5 mg/dL      AST 12 U/L      ALT 32 U/L      Alkaline Phosphatase 132 U/L      Total Protein 6 6 g/dL      Albumin 3 2 g/dL      Total Bilirubin 0 90 mg/dL      eGFR 62 ml/min/1 73sq m     Narrative:      [place] guidelines for Chronic Kidney Disease (CKD):   •  Stage 1 with normal or high GFR (GFR > 90 mL/min/1 73 square meters)  •  Stage 2 Mild CKD (GFR = 60-89 mL/min/1 73 square meters)  •  Stage 3A Moderate CKD (GFR = 45-59 mL/min/1 73 square meters)  •  Stage 3B Moderate CKD (GFR = 30-44 mL/min/1 73 square meters)  •  Stage 4 Severe CKD (GFR = 15-29 mL/min/1 73 square meters)  •  Stage 5 End Stage CKD (GFR <15 mL/min/1 73 square meters)  Note: GFR calculation is accurate only with a steady state creatinine    CBC and differential [[id number]] Collected: 10/28/22 0501    Lab Status: Final result Specimen: Blood from Arm, Right Updated: 10/28/22 0509     WBC 8 92 Thousand/uL      RBC 4 32 Million/uL      Hemoglobin 13 1 g/dL      Hematocrit 38 8 %      MCV 90 fL      MCH 30 3 pg      MCHC 33 8 g/dL      RDW 12 8 %      MPV 10 6 fL      Platelets 687 Thousands/uL      nRBC 0 /100 WBCs      Neutrophils Relative 61 %      Immat GRANS % 0 %      Lymphocytes Relative 27 %      Monocytes Relative 8 %      Eosinophils Relative 3 %      Basophils Relative 1 %      Neutrophils Absolute 5 45 Thousands/µL      Immature Grans Absolute 0 03 Thousand/uL      Lymphocytes Absolute 2 42 Thousands/µL      Monocytes Absolute 0 72 Thousand/µL      Eosinophils Absolute 0 25 Thousand/µL      Basophils Absolute 0 05 Thousands/µL     TSH, 3rd generation [[id number]]  (Abnormal) Collected: 10/27/22 0718    Lab Status: Final result Specimen: Blood from Arm, Right Updated: 10/27/22 1015     TSH 3RD GENERATON 5 411 uIU/mL     Narrative:      Patients undergoing fluorescein dye angiography may retain small amounts of fluorescein in the body for 48-72 hours post procedure  Samples containing fluorescein can produce falsely depressed TSH values  If the patient had this procedure,a specimen should be resubmitted post fluorescein clearance        CBC (With Platelets) [[id number]]  (Abnormal) Collected: 10/27/22 0718    Lab Status: Final result Specimen: Blood from Arm, Right Updated: 10/27/22 0824     WBC 9 75 Thousand/uL      RBC 4 74 Million/uL      Hemoglobin 14 0 g/dL      Hematocrit 41 8 %      MCV 88 fL      MCH 29 5 pg      MCHC 33 5 g/dL      RDW 12 7 %      Platelets 174 Thousands/uL      MPV 10 8 fL     Comprehensive metabolic panel [[id number]]  (Abnormal) Collected: 10/27/22 0718    Lab Status: Final result Specimen: Blood from Arm, Right Updated: 10/27/22 0742     Sodium 138 mmol/L      Potassium 4 2 mmol/L      Chloride 106 mmol/L      CO2 26 mmol/L      ANION GAP 6 mmol/L      BUN 11 mg/dL      Creatinine 1 18 mg/dL      Glucose 109 mg/dL      Calcium 9 3 mg/dL      Corrected Calcium 9 9 mg/dL      AST 16 U/L      ALT 33 U/L      Alkaline Phosphatase 138 U/L      Total Protein 6 9 g/dL      Albumin 3 3 g/dL      Total Bilirubin 0 90 mg/dL      eGFR 65 ml/min/1 73sq m     Narrative:      [place] guidelines for Chronic Kidney Disease (CKD):   •  Stage 1 with normal or high GFR (GFR > 90 mL/min/1 73 square meters)  •  Stage 2 Mild CKD (GFR = 60-89 mL/min/1 73 square meters)  •  Stage 3A Moderate CKD (GFR = 45-59 mL/min/1 73 square meters)  •  Stage 3B Moderate CKD (GFR = 30-44 mL/min/1 73 square meters)  •  Stage 4 Severe CKD (GFR = 15-29 mL/min/1 73 square meters)  •  Stage 5 End Stage CKD (GFR <15 mL/min/1 73 square meters)  Note: GFR calculation is accurate only with a steady state creatinine    HS Troponin I 4hr [[id number]]  (Normal) Collected: 10/26/22 1453    Lab Status: Final result Specimen: Blood from Arm, Left Updated: 10/26/22 1522     hs TnI 4hr 13 ng/L      Delta 4hr hsTnI 3 ng/L     TSH, 3rd generation with Free T4 reflex [[id number]]  (Normal) Collected: 10/26/22 1250    Lab Status: Final result Specimen: Blood from Arm, Left Updated: 10/26/22 1325     TSH 3RD GENERATON 3 733 uIU/mL     Narrative:      Patients undergoing fluorescein dye angiography may retain small amounts of fluorescein in the body for 48-72 hours post procedure   Samples containing fluorescein can produce falsely depressed TSH values  If the patient had this procedure,a specimen should be resubmitted post fluorescein clearance        HS Troponin I 2hr [[id number]]  (Normal) Collected: 10/26/22 1250    Lab Status: Final result Specimen: Blood from Arm, Left Updated: 10/26/22 1325     hs TnI 2hr 11 ng/L      Delta 2hr hsTnI 1 ng/L     HS Troponin 0hr (reflex protocol) [[id number]]  (Normal) Collected: 10/26/22 1035    Lab Status: Final result Specimen: Blood from Arm, Left Updated: 10/26/22 1120     hs TnI 0hr 10 ng/L     Comprehensive metabolic panel [[id number]]  (Abnormal) Collected: 10/26/22 1035    Lab Status: Final result Specimen: Blood from Arm, Left Updated: 10/26/22 1113     Sodium 139 mmol/L      Potassium 4 3 mmol/L      Chloride 105 mmol/L      CO2 27 mmol/L      ANION GAP 7 mmol/L      BUN 14 mg/dL      Creatinine 1 18 mg/dL      Glucose 137 mg/dL      Calcium 9 1 mg/dL      Corrected Calcium 9 7 mg/dL      AST 20 U/L      ALT 38 U/L      Alkaline Phosphatase 124 U/L      Total Protein 6 8 g/dL      Albumin 3 2 g/dL      Total Bilirubin 0 60 mg/dL      eGFR 65 ml/min/1 73sq m     Narrative:      [place] guidelines for Chronic Kidney Disease (CKD):   •  Stage 1 with normal or high GFR (GFR > 90 mL/min/1 73 square meters)  •  Stage 2 Mild CKD (GFR = 60-89 mL/min/1 73 square meters)  •  Stage 3A Moderate CKD (GFR = 45-59 mL/min/1 73 square meters)  •  Stage 3B Moderate CKD (GFR = 30-44 mL/min/1 73 square meters)  •  Stage 4 Severe CKD (GFR = 15-29 mL/min/1 73 square meters)  •  Stage 5 End Stage CKD (GFR <15 mL/min/1 73 square meters)  Note: GFR calculation is accurate only with a steady state creatinine    CBC and differential [[id number]]  (Abnormal) Collected: 10/26/22 1035    Lab Status: Final result Specimen: Blood from Arm, Left Updated: 10/26/22 1054     WBC 8 81 Thousand/uL      RBC 4 23 Million/uL      Hemoglobin 12 6 g/dL      Hematocrit 37 9 %      MCV 90 fL      MCH 29 8 pg      MCHC 33 2 g/dL      RDW 12 5 %      MPV 10 5 fL      Platelets 074 Thousands/uL      nRBC 0 /100 WBCs      Neutrophils Relative 69 %      Immat GRANS % 1 %      Lymphocytes Relative 22 %      Monocytes Relative 7 %      Eosinophils Relative 1 %      Basophils Relative 0 %      Neutrophils Absolute 6 06 Thousands/µL      Immature Grans Absolute 0 04 Thousand/uL      Lymphocytes Absolute 1 93 Thousands/µL      Monocytes Absolute 0 63 Thousand/µL      Eosinophils Absolute 0 12 Thousand/µL      Basophils Absolute 0 03 Thousands/µL                  XR chest 2 views   Final Result by Markos Watkins MD (10/26 1101)   Status post mitral valve replacement      No acute cardiopulmonary disease        Previous right-sided infiltrates have resolved            Workstation performed: [id number]                    Procedures  ECG 12 Lead Documentation Only    Date/Time: 10/26/2022 10:40 AM  Performed by: Claudean Rubins, MD  Authorized by: Claudean Rubins, MD     Indications / Diagnosis:  Palpitations  ECG reviewed by me, the ED Provider: yes    Patient location:  ED  Rate:     ECG rate assessment: tachycardic    Rhythm:     Rhythm: atrial flutter    Ectopy:     Ectopy: none    QRS:     QRS axis:  Normal    QRS intervals:  Normal  Conduction:     Conduction: normal    ST segments:     ST segments:  Non-specific  T waves:     T waves: non-specific    CriticalCare Time  Performed by: Claudean Rubins, MD  Authorized by: Claudean Rubins, MD     Critical care provider statement:     Critical care time (minutes):  35    Critical care time was exclusive of:  Separately billable procedures and treating other patients    Critical care was necessary to treat or prevent imminent or life-threatening deterioration of the following conditions:  Cardiac failure    Critical care was time spent personally by me on the following activities:  Obtaining history from patient or surrogate, development of treatment plan with patient or surrogate, discussions with consultants, evaluation of patient's response to treatment, interpretation of cardiac output measurements, ordering and performing treatments and interventions, ordering and review of laboratory studies, ordering and review of radiographic studies and re-evaluation of patient's condition             ED Course                               SBIRT 22yo+    Flowsheet Row Most Recent Value   SBIRT (23 yo +)    In order to provide better care to our patients, we are screening all of our patients for alcohol and drug use  Would it be okay to ask you these screening questions? Yes Filed at: 10/26/2022 1028   Initial Alcohol Screen: US AUDIT-C     1  How often do you have a drink containing alcohol? 0 Filed at: 10/26/2022 1028   2  How many drinks containing alcohol do you have on a typical day you are drinking? 0 Filed at: 10/26/2022 1028   3a  Male UNDER 65: How often do you have five or more drinks on one occasion? 0 Filed at: 10/26/2022 1028   3b  FEMALE Any Age, or MALE 65+: How often do you have 4 or more drinks on one occassion? 0 Filed at: 10/26/2022 1028   Audit-C Score 0 Filed at: 10/26/2022 1028   MARI: How many times in the past year have you    Used an illegal drug or used a prescription medication for non-medical reasons?  Never Filed at: 10/26/2022 1028                    MDM    Disposition  Final diagnoses:   Atrial flutter ([address] )     Time reflects when diagnosis was documented in both MDM as applicable and the Disposition within this note     Time User Action Codes Description Comment    10/26/2022 11:55 AM Shamar Braun Add [I48 92] Atrial flutter ([address] )     10/27/2022 12:51 PM Villa Hines Add [I48 92] Atrial flutter with rapid ventricular response [facility])       ED Disposition     ED Disposition   Admit    Condition   Stable    Date/Time   Wed Oct 26, 2022  4:11 PM    Comment   Case was discussed with Dr Ildefonso Villalobos and the patient's admission status was agreed to be Admission Status: inpatient status to the service of Dr Pam Cagle   Follow-up Information    None         Current Discharge Medication List      START taking these medications    Details   amiodarone 200 mg tablet Take 1 tablet (200 mg total) by mouth daily  Qty: 30 tablet, Refills: 0    Associated Diagnoses: Atrial flutter (HCC)      apixaban (Eliquis) 5 mg Take 1 tablet (5 mg total) by mouth 2 (two) times a day  Qty: 60 tablet, Refills: 0    Associated Diagnoses: Atrial flutter with rapid ventricular response (HCC)      metoprolol succinate (TOPROL-XL) 25 mg 24 hr tablet Take 1 tablet (25 mg total) by mouth daily Do not start before October 29, 2022  Qty: 30 tablet, Refills: 0    Associated Diagnoses: Atrial flutter ([address] )         CONTINUE these medications which have NOT CHANGED    Details   acetaminophen (TYLENOL) 325 mg tablet Take 2 tablets (650 mg total) by mouth every 6 (six) hours as needed for mild pain  Qty: 30 tablet, Refills: 0    Associated Diagnoses: S/P MVR (mitral valve repair); 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)      Ascorbic Acid (vitamin C) 1000 MG tablet every 24 hours      atorvastatin (LIPITOR) 40 mg tablet Take 1 tablet (40 mg total) by mouth daily with dinner  Qty: 30 tablet, Refills: 3    Associated Diagnoses: S/P MVR (mitral valve repair); 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)      Cyanocobalamin (Vitamin B-12) 500 MCG LOZG every 24 hours      Lysine 1000 MG TABS       Multiple Vitamin (MULTI-VITAMIN DAILY PO)       Omega-3 Fatty Acids (Fish Oil) 500 MG CAPS Every 12 hours      omeprazole (PriLOSEC OTC) 20 MG tablet Take 1 tablet (20 mg total) by mouth daily  Qty: 30 tablet, Refills: 0    Associated Diagnoses: S/P MVR (mitral valve repair);  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

## 2022-10-28 NOTE — DISCHARGE SUMMARY
[place]  Discharge- Sang Araujo 1960, 60 y o  male MRN: [id number]  Unit/Bed#: -01 Encounter: [id number]  Primary Care Provider: Wolf Lackey DO   Date and time admitted to hospital: 10/26/2022 10:53 AM    HLD (hyperlipidemia)  Assessment & Plan  On statin    S/P MVR (mitral valve repair)  Assessment & Plan  Completed asa  Resume lopressor per cards    Chronic heart failure with preserved ejection fraction (HFpEF) (HCC)  Assessment & Plan  Wt Readings from Last 3 Encounters:   10/28/22 79 kg (174 lb 2 6 oz)   10/25/22 83 5 kg (184 lb)   10/11/22 84 2 kg (185 lb 10 oz)   appears compensated  Completed torsemide now stable off        * Atrial flutter with rapid ventricular response ([address] )  Assessment & Plan  S/p successful CV  Observe on tele after CV   Eliquis started by cards  Remains in sinus  Started on amio per cards         Hospital Course:     Sang Araujo is a 60 y o  male patient who originally presented to the hospital on   Admission Orders (From admission, onward)     Ordered        10/27/22 0116  INPATIENT ADMISSION  Once                     due to rapid atrial flutter  Patient required diltiazem drip  He was seen by Cardiology and arranged for cardioversion  This was successful  He will be maintained on Eliquis, beta-blocker, amiodarone per Cardiology  Patient is without complaints at the time of discharge  Please see above list of diagnoses and related plan for additional information       Physical Exam:    GEN: No acute distress, comfortable  HEEENT: No JVD, PERRLA, no scleral icterus  RESP: Lungs clear to auscultation bilaterally  CV: RRR, +s1/s2   ABD: SOFT NON TENDER, POSITIVE BOWEL SOUNDS, NO DISTENTION  PSYCH: CALM  NEURO: A X O X 3, NO FOCAL DEFICITS  SKIN: NO RASH  EXTREM: NO EDEMA    CONSULTING PROVIDERS   IP CONSULT TO CARDIOLOGY    PROCEDURES PERFORMED  * No surgery found *    RADIOLOGY RESULTS  XR chest portable    Result Date: 10/9/2022  Narrative: CHEST INDICATION:   post-op CTS  COMPARISON:  CXR 10/8/2022 and chest CT 10/7/2022  EXAM PERFORMED/VIEWS:  XR CHEST PORTABLE FINDINGS:  ET tube and NG tube removed  Right jugular catheter in upper SVC  Pulmonary artery catheter in main pulmonary artery  Balloon pump 4 cm below the arch  Normal heart size, MVR  Temporary epicardial pacemaker wires  Two mediastinal drains  Sternal wires well aligned  Improving asymmetric pulmonary edema, greater on the right  No effusion or pneumothorax  Osseous structures appear within normal limits for patient age  Impression: Improving asymmetric pulmonary edema, greater on the right  Workstation performed: [id number]     XR chest portable    Result Date: 10/8/2022  Narrative: CHEST INDICATION:   MVR 10/7/2022  COMPARISON:  CXR and CT 10/7/2022  EXAM PERFORMED/VIEWS:  XR CHEST PORTABLE FINDINGS:  ET tube 4 cm above the charly  NG tube below the diaphragm  Right jugular pulmonary artery catheter in main pulmonary artery  Right jugular catheter in upper SVC  Balloon pump 3 5 cm below the arch  Normal heart size  MVR  Sternal wires well aligned  Two mediastinal drains  Temporary epicardial pacemaker wires  Minimal improvement in severe asymmetric pulmonary edema, greater on the right  Probable small effusions  No pneumothorax  Osseous structures appear within normal limits for patient age  Impression: Status post MVR  Minimal improvement in severe asymmetric pulmonary edema, greater on the right  Probable small effusions  Workstation performed: [id number]     XR chest portable    Result Date: 10/7/2022  Narrative: CHEST INDICATION:   left lung field rales, mild hypoxemia, cough  COMPARISON:  None EXAM PERFORMED/VIEWS:  XR CHEST PORTABLE FINDINGS: Cardiomediastinal silhouette appears unremarkable  Multifocal bilateral lung opacity  No definite effusion or pneumothorax  Osseous structures appear within normal limits for patient age  Impression: Multifocal bilateral lung opacity which could be due to edema and/or pneumonia  Workstation performed: [id number]     XR chest 2 views    Result Date: 10/26/2022  Narrative: CHEST INDICATION:   Chest Pain  COMPARISON:  10/9/2022 EXAM PERFORMED/VIEWS:  XR CHEST PA & LATERAL Images: 3 FINDINGS: Previous right-sided infiltrates have resolved Sternal wires and mitral valve replacement again evident Cardiomediastinal silhouette appears unremarkable  The lungs are clear  No pneumothorax or pleural effusion  Osseous structures appear within normal limits for patient age  Impression: Status post mitral valve replacement No acute cardiopulmonary disease  Previous right-sided infiltrates have resolved Workstation performed: [id number]     CT chest wo contrast    Result Date: 10/7/2022  Narrative: CT CHEST WITHOUT IV CONTRAST INDICATION:   Sepsis Pneumonia, effusion or abscess suspected, xray done Abnormal xray - interstitial infiltrates PNA  COMPARISON:  None  TECHNIQUE: CT examination of the chest was performed without intravenous contrast  Axial, sagittal, and coronal 2D reformatted images were created from the source data and submitted for interpretation  Radiation dose length product (DLP) for this visit:  247 mGy-cm   This examination, like all CT scans performed in the [facility], was performed utilizing techniques to minimize radiation dose exposure, including the use of iterative reconstruction and automated exposure control  FINDINGS: LUNGS:  Extensive right greater than left groundglass and alveolar opacities are identified  PLEURA:  Small right greater than left pleural effusions are present  HEART/GREAT VESSELS: Heart is unremarkable for patient's age  No thoracic aortic aneurysm  MEDIASTINUM AND DALILA:  Unremarkable  CHEST WALL AND LOWER NECK:  Unremarkable  VISUALIZED STRUCTURES IN THE UPPER ABDOMEN:  Unremarkable   OSSEOUS STRUCTURES:  No acute fracture or destructive osseous lesion  Impression: Extensive right greater than left groundglass and alveolar opacities are identified  Findings again may reflect multi lobar pneumonia or pulmonary edema or combination of both  Small right greater than left pleural effusions  Workstation performed: [id number]     Cardiac catheterization    Result Date: 10/7/2022  Narrative: · No angiographic evidence of CAD      FLIP Anesthesia    Result Date: 10/7/2022  Narrative: José Luis Griffith MD     10/7/2022  8:05 PM Procedure Performed: FLIP Anesthesia Start Time: Preanesthesia Checklist Patient identified, IV assessed, risks and benefits discussed, monitors and equipment assessed, procedure being performed at surgeon's request and anesthesia consent obtained  Procedure  Type of FLIP: complete FLIP with interpretation  Images Saved: ultrasound permanent image saved  Location performed: OR  Intubated  Heart visualized  Insertion of FLIP Probe:  Easy  Probe Type:  Epiaortic and multiplane  Modalities:  Color flow mapping, 3D, pulse wave Doppler and continuous wave Doppler  Echocardiographic and Doppler Measurements PREPROCEDURE LEFT VENTRICLE: Systolic Function: normal   Cavity size: dilated  Cavity Dimension: 6 cm  RIGHT VENTRICLE: Systolic Function: normal   Cavity size mildly dilated  AORTIC VALVE: Leaflets: normal and trileaflet  Regurgitation: trace  MITRAL VALVE: Leaflets: myxomatous  Leaflet Motions: flail  Regurgitation: severe  Specific leaflet segments with abnormal motions are described in the following comments: p2 shaded to p1 flail  TRICUSPID VALVE: Leaflets: normal  Leaflet Motions: normal   Regurgitation: mild  OTHER VALVE FINDINGS: Myxomatous MV with flail P2 segment with possible contribution of P1; abhilash-medially directed torrential MR  ASCENDING AORTA: Size:  normal   Dissection not present  AORTIC ARCH: Size:  normal   dissection not present  DESCENDING AORTA: Size: normal   Dissection not present   OTHER AORTIC FINDINGS:  IABP approx 8 cm distal to arch/desc junction  RIGHT ATRIUM:  No spontaneous echo contrast  LEFT ATRIUM: Size: dilated  LEFT ATRIAL APPENDAGE:  No spontaneous echo contrast OTHER ATRIAL FINDINGS: Atrial septal bowing towards RA  EPIAORTIC: Plaque Thickness: 0-5 mm  OTHER FINDINGS: Pericardium:  normal  Pleural Effusion:  left and small/moderate  POSTPROCEDURE LEFT VENTRICLE: Unchanged   Ejection Fraction: 60 %  Cavity Size: normal  RIGHT VENTRICLE: Systolic Function: normal  Cavity Size: mildly dilated  AORTIC VALVE: Unchanged   MITRAL VALVE: Leaflets: ring  Regurgitation: trace  Mean Gradient: 2  TRICUSPID VALVE: Unchanged   Regurgitation: mild  OTHER VALVE FINDINGS: S/p ring mitral annuloplasty; well seated; trace early systolic MR; no BILLY  ATRIA: Unchanged   OTHER ATRIAL FINDINGS: PFO noted during surgical procedure and closed; small residual PFO with R-L flow noted, reviewed with JRF  AORTA: Unchanged   Dissection: Dissection not present  OTHER AORTA FINDINGS: Balloon well seated  REMOVAL: Probe Removal: atraumatic  XR chest portable ICU    Result Date: 10/8/2022  Narrative: CHEST INDICATION:   S/P open heart  MVR 10/7/2022  COMPARISON:  CXR and CT 10/7/2022  EXAM PERFORMED/VIEWS:  XR CHEST PORTABLE ICU FINDINGS:  ET tube 5 cm above the charly  Right jugular catheter in SVC  Right jugular pulmonary artery catheter in main pulmonary artery  Balloon pump 5 cm below the arch  Normal heart size  MVR  Two mediastinal drains  Temporary epicardial pacemaker wires  Severe pulmonary edema, increased  No effusion or pneumothorax  Osseous structures appear within normal limits for patient age  Impression: Status post MVR Worsening pulmonary edema, severe  Workstation performed: [id number]     Cardioversion    Result Date: 10/27/2022  Narrative: Cardioversion Chelsey Estrada is a 60 y o  male who follows with Steven Gao PA-C in the office   Indication for procedure: Cardioversion Requesting physician: Nikolay Anticoagulation: Eliquis The patient was identified in the Procedure lab  A time out procedure was performed  The patient was sedated by the anesthesia service with Propofol  The patient was cardioverted to sinus rhythm with a synchronized 200J biphasic shock  There were no complications  The patient was monitored for the appropriate time interval in the holding area, and was transferred back to the medical floor in stable condition  Echo complete w/ contrast if indicated    Result Date: 10/7/2022  Narrative: •  Left Ventricle: Left ventricular cavity size is normal  Wall thickness is normal  There is mild asymmetric hypertrophy of the basal septal wall  The left ventricular ejection fraction is 70% by visual estimation  Systolic function is hyperdynamic  Wall motion is normal  •  Mitral Valve: The posterior leaflet is flail with about 11 mm gap to anterior leaflet  A small segment of a torn chorda tendinea is visualized  The valve does not appear significantly thickened or myxomatous  There is severe regurgitation  Acute severe MR due to flail posterior leaflet  There is no torn papillary seen  A small segment of chorda tendinea is appreciated suggesting ruptured chorda as etiology  The valve is not significant myxomatous  No LV wall motion abnormalities are seen  Findings communicated to primary team directly  FLIP    Result Date: 10/27/2022  Narrative: •  Left Ventricle: Left ventricular cavity size is normal  Wall thickness is normal  The left ventricular ejection fraction is 65%  Systolic function is normal  Wall motion is normal  •  Right Ventricle: Right ventricular cavity size is normal  Systolic function is normal  •  Atrial Septum: There is a small and patent foramen ovale confirmed at rest with predominant left to right shunting using color Doppler  •  Left Atrial Appendage: There is normal function  There is no thrombus  •  Mitral Valve:  The valve has been repaired with an annular ring  There are some remaining flail chordae prolapsing into the left atrium through the mitral valve  There is mild to moderate regurgitation  •  Tricuspid Valve: There is mild regurgitation  LABS  Results from last 7 days   Lab Units 10/28/22  0501 10/27/22  0718 10/26/22  1035   WBC Thousand/uL 8 92 9 75 8 81   HEMOGLOBIN g/dL 13 1 14 0 12 6   HEMATOCRIT % 38 8 41 8 37 9   MCV fL 90 88 90   PLATELETS Thousands/uL 372 461* 425*     Results from last 7 days   Lab Units 10/28/22  0501 10/27/22  0718 10/26/22  1035   SODIUM mmol/L 141 138 139   POTASSIUM mmol/L 4 5 4 2 4 3   CHLORIDE mmol/L 106 106 105   CO2 mmol/L 28 26 27   BUN mg/dL 17 11 14   CREATININE mg/dL 1 23 1 18 1 18   CALCIUM mg/dL 8 9 9 3 9 1   ALBUMIN g/dL 3 2* 3 3* 3 2*   TOTAL BILIRUBIN mg/dL 0 90 0 90 0 60   ALK PHOS U/L 132* 138* 124*   ALT U/L 32 33 38   AST U/L 12 16 20   EGFR ml/min/1 73sq m 62 65 65   GLUCOSE RANDOM mg/dL 93 109 137     Results from last 7 days   Lab Units 10/26/22  1453 10/26/22  1250 10/26/22  1035   HS TNI 0HR ng/L  --   --  10   HS TNI 2HR ng/L  --  11  --    HS TNI 4HR ng/L 13  --   --                       Results from last 7 days   Lab Units 10/27/22  0718   TSH 3RD GENERATON uIU/mL 5 411*               Cultures:         Invalid input(s): URIBILINOGEN                Condition at Discharge:  good      Discharge instructions/Information to patient and family:   See after visit summary for information provided to patient and family  Provisions for Follow-Up Care:  See after visit summary for information related to follow-up care and any pertinent home health orders  Disposition:     Home       Discharge Statement:  I spent 31 minutes discharging the patient  This time was spent on the day of discharge  I had direct contact with the patient on the day of discharge   Greater than 50% of the total time was spent examining patient, answering all patient questions, arranging and discussing plan of care with patient as well as directly providing post-discharge instructions  Additional time then spent on discharge activities  Discharge Medications:  See after visit summary for reconciled discharge medications provided to patient and family        ** Please Note: This note has been constructed using a voice recognition system **

## 2022-10-28 NOTE — ASSESSMENT & PLAN NOTE
S/p successful CV  Observe on tele after CV   Eliquis started by cards  Remains in sinus  Started on amio per cards

## 2022-10-31 NOTE — UTILIZATION REVIEW
NOTIFICATION OF ADMISSION DISCHARGE   This is a Notification of Discharge from [address]  Please be advised that this patient has been discharge from our facility  Below you will find the admission and discharge date and time including the patient’s disposition  UTILIZATION REVIEW CONTACT:  Leticia Holly  Utilization   Network Utilization Review Department  Phone: [phone] carefully listen to the prompts  All voicemails are confidential   Email: [email or web address]     ADMISSION INFORMATION  PRESENTATION DATE: 10/26/2022 10:53 AM  OBERVATION ADMISSION DATE:   INPATIENT ADMISSION DATE: 10/27/22  1:16 AM   DISCHARGE DATE: 10/28/2022 10:00 AM  DISPOSITION: Home with [place] with [address] INFORMATION:  Send all requests for admission clinical reviews, approved or denied determinations and any other requests to dedicated fax number below belonging to the campus where the patient is receiving treatment   List of dedicated fax numbers:  [address] DENIALS (Administrative/Medical Necessity) [phone]   [address] (Maternity/NICU/Pediatrics) [phone]   [facility] [phone]   [facility] [phone]   [address] [phone]   220 [facility] [phone] 90 [facility] [phone]   [address] [place], [place] [address] 119 [phone]   [facility]  [phone]   40[address] [facility] [phone] [facility] [address] [facility] [phone]

## 2022-11-07 ENCOUNTER — CLINICAL SUPPORT (OUTPATIENT)
Dept: CARDIAC REHAB | Facility: HOSPITAL | Age: 62
End: 2022-11-07

## 2022-11-07 DIAGNOSIS — Z98.890 S/P MVR (MITRAL VALVE REPAIR): Primary | ICD-10-CM

## 2022-11-07 DIAGNOSIS — I51.1 ACUTE MITRAL REGURGITATION FROM CHORDAL RUPTURE (HCC): ICD-10-CM

## 2022-11-07 DIAGNOSIS — I34.0 ACUTE MITRAL REGURGITATION FROM CHORDAL RUPTURE (HCC): ICD-10-CM

## 2022-11-07 NOTE — PROGRESS NOTES
CARDIAC REHAB ASSESSMENT    Today's date: 2022  Patient name: Paz Frazier     : 1960       MRN: [id number]  PCP: Avram Crigler, DO  Referring Physician: Pooja Singh,*  Cardiologist: Does not know yet  Surgeon: Dr Jose Luis Zuñiga  Dx:   Encounter Diagnoses   Name Primary? • S/P MVR (mitral valve repair)    • Acute mitral valve rupture (HCC)        Date of onset: 10/7/2022  Cultural needs: n/a    Weight    Wt Readings from Last 1 Encounters:   10/28/22 79 kg (174 lb 2 6 oz)      Height:   Ht Readings from Last 1 Encounters:   10/26/22 6' 1" (1 854 m)     Medical History:   Past Medical History:   Diagnosis Date   • ASD (atrial septal defect)     s/p repair   • Hyperlipidemia    • Severe mitral regurgitation     s/p repair         Physical Limitations: none    Fall Risk: Low   Comments: Ambulates with a steady gait with no assist device    Anginal Equivalent: None/denies angina   NTG use: No prescription    Risk Factors   Cholesterol: No  Smoking: Never used  HTN: No  DM: No  Obesity: No   Inactivity: No  Stress:  perceived  stress: 4/10   Stressors:minimal stress   Goals for Stress Management:Practice Relaxation Techniques, Exercise, Keep a positive mindset, Enjoy a hobby and Spend time with family    Family History:No family history on file  Allergies: Patient has no known allergies    ETOH:   Social History     Substance and Sexual Activity   Alcohol Use Not Currently         Current Medications:   Current Outpatient Medications   Medication Sig Dispense Refill   • acetaminophen (TYLENOL) 325 mg tablet Take 2 tablets (650 mg total) by mouth every 6 (six) hours as needed for mild pain 30 tablet 0   • amiodarone 200 mg tablet Take 1 tablet (200 mg total) by mouth daily 30 tablet 0   • apixaban (Eliquis) 5 mg Take 1 tablet (5 mg total) by mouth 2 (two) times a day 60 tablet 0   • Ascorbic Acid (vitamin C) 1000 MG tablet every 24 hours     • atorvastatin (LIPITOR) 40 mg tablet Take 1 tablet (40 mg total) by mouth daily with dinner 30 tablet 3   • Cyanocobalamin (Vitamin B-12) 500 MCG LOZG every 24 hours     • Lysine 1000 MG TABS      • metoprolol succinate (TOPROL-XL) 25 mg 24 hr tablet Take 1 tablet (25 mg total) by mouth daily Do not start before October 29, 2022  30 tablet 0   • Multiple Vitamin (MULTI-VITAMIN DAILY PO)      • Omega-3 Fatty Acids (Fish Oil) 500 MG CAPS Every 12 hours     • omeprazole (PriLOSEC OTC) 20 MG tablet Take 1 tablet (20 mg total) by mouth daily 30 tablet 0     No current facility-administered medications for this visit  Functional Status Prior to Diagnosis for Treatment   Occupation: retired  Recreation: working around house and yard  ADL’s: No limitations  [place]: No limitations  Exercise: regular walking  Other: n/a    Current Functional Status  Occupation: retired  Recreation: has not resumed since surgery due to weight restrictions  ADL’s:resumed all ADLs within sternal precautions  [place]: No limitations  Exercise: regular walking 20-30 min-not overdoing and taking it easy right now  Other: n/a    Patient Specific Goals:  Be able to walk on incline on TM with less effort as endurance is built with exercise, improve overall strength and be able to return to working in yard without limitations    Short Term Program Goals: dietary modifications increased strength improved energy/stamina with ADLs exercise 120-150 mins/wk    Long Term Goals: increased maximal walking duration  increased intial training workload  Improved [facility] Activity Status score  Improved functional capacity  Improved Quality of Life - [facility] score reduced    Ability to reach goals/rehabilitation potential:  Excellent    Projected return to function: 12 weeks  Objective tests: sub-max TM ETT      Nutritional   Reviewed details of Rate your Plate  Discussed key elements of heart healthy eating   Reviewed patient goals for dietary modifications and their clinical implications  Reviewed most recent lipid profile       Goals for dietary modification: choose lean cuts of meat  poultry without the skin  low fat ground meat and poultry  eliminate processed meats  reduce portions of meat to 3 oz  increase fish intake  more meatless meals  low fat dairy   reduced fat cheese  increase whole grains  increase fruits and vegetables  eliminate butter  low sodium  improved snack choices  more nuts/seeds  reduce sweets/frozen desserts      Emotional/Social  Patient reports he/she is coping well with good social support and denies depression or anxiety  Reports sufficient emotional support    Marital status:     Domestic Violence Screening: No    Comments: n/a

## 2022-11-07 NOTE — PROGRESS NOTES
Cardiac Rehabilitation Plan of Care   Initial Care Plan          Today's date: 2022   # of Exercise Sessions Completed: 1-evaluation  Patient name: Rere Leo      : 1960  Age: 60 y o  MRN: [id number]  Referring Physician: Carolee Barbosa,*  Cardiologist: not assigned yet-Dr Claudia Cruz medical director  Provider: Rebel  Clinician: Maribell Cedillo MS, CEP      Dx:   Encounter Diagnoses   Name Primary? • S/P MVR (mitral valve repair)    • Acute mitral valve rupture [facility])      Date of onset: 10/7/2022      SUMMARY OF PROGRESS:  Mr Doug Jones is here today, accompanied by his wife, for his cardiac rehabilitation evaluation after recent MVR on 10/7/2022  Overall he reports he has been doing well with recovery and is feeling great  He is walking at home, but easy pace to not overdo until he has clearance from surgeon at the end of this week  He is looking forward to starting his cardiac rehab program to feel confident with exercise and know how far he is able to push himself  His goals for his rehab program are to be able to walk on incline on TM with less effort as endurance is built with exercise, and improve overall strength and be able to return to working in yard without limitations  He is doing well with his diet and is making healthy choices  He denies depression (PHQ-9=0) and anxiety (GOLD-7=0) at this time  He reports he has excellent emotional support from his wife and family  He completed TM ETT today reaching THR at 13 min at 7 5 METs  He tolerated the assessment well with normal hemodynamic response to exercise  Will plan to start exercise at 3 5-4 0 METs and increase as tolerated over first 30 days of rehab  He will plan to continue walking and building up more distance at home on non-rehab days  He is in agreement to attend cardiac rehab sessions 3x/week for 12 weeks, or 36 sessions   He will start his sessions next week on 11/15/2022 after he has follow up with   Juanince Rom later this week         Medication compliance: Yes   Comments: Pt reports to be compliant with medications  Fall Risk: Low   Comments: Ambulates with a steady gait with no assist device    EKG Interpretation: NSR      EXERCISE ASSESSMENT and PLAN    Exercise Prescription:      Frequency: 3 days/week   Supplement with home exercise 2+ days/wk as tolerated       Minutes: 40-50         METS: 3 5-4 0            HR: 30 beats above resting   RPE: 4-6         Modalities: Treadmill, UBE, Lifecycle, Elliptical and Recumbent bike      30 Day Goals for Exercise Progression:    Frequency: 3 days/week of cardiac rehab       Supplement with home exercise 2+ days/wk as tolerated    Minutes: 40-50                              >150 mins/wk of moderate intensity exercise   METS: 4 0-4 5   HR: 30 beats above resting    RPE: 4-6   Modalities: Treadmill, UBE, Lifecycle, Elliptical, Rower and Recumbent bike    Strength trainin-3 days / week  12-15 repetitions  1-2 sets per modality   Will be added following at least 8 weeks post surgery and 8-10 monitored sessions   Modalities: Pull Downs, Lateral Raise, Arm Extension, Arm Curl, Upright Rows, Front Raises, Shoulder Shrugs and leg ext    Home Exercise: Type: walking, Frequency: 3-4 days/week    Goals: 10% improvement in functional capacity - based on max METs achieved in fitness assessment, improved DASI score by 10%, Increase in exercise capacity by 40% - based on peak METs tolerated in cardiac rehab exercise session, Exercise 5 days/wk, >150mins/wk of moderate intensity exercise, Resume ADLs with increased strength, Attend Rehab regularly and start a home exercise program    Progression Toward Goals:  Reviewed Pt goals and determined plan of care    Education: benefit of exercise for CAD risk factors, home exercise guidelines, AHA guidelines to achieve >150 mins/wk of moderate exercise and RPE scale   Plan:education on home exercise guidelines, home exercise 30+ mins 2 days opposite CR and Education class: Risk Factors for Heart Disease  Readiness to change: Action:  (Changing behavior)      NUTRITION ASSESSMENT AND PLAN    Weight control:    Starting weight: 197 lb   Current weight:       Diabetes: N/A  A1c: 5 2    last measured: 10/7/2022    Lipid management: Discussed diet and lipid management and Last lipid profile 10/7/2022  Chol 245  TRG 77        Goals:LDL <100, HDL >40, TRG <150 and CHOL <200    Progression Toward Goals: Reviewed Pt goals and determined plan of care    Education: heart healthy eating  low sodium diet  hydration  Plan: Education class: Reading Food Labels and Education Class: Heart Healthy Eating  Readiness to change: Action:  (Changing behavior)      PSYCHOSOCIAL ASSESSMENT AND PLAN    Emotional:  Depression assessment:  PHQ-9 = 0  0 =No Depression            Anxiety measure:  GOLD-7 = 0  0-4  = Not anxious  Self-reported stress level:  4  Social support: Excellent    Goals:  Physical Fitness in [facility] Score < 3    Progression Toward Goals: Reviewed Pt goals and determined plan of care    Education: signs/sxs of depression and benefits of a positive support system  Plan: Class: Stress and Your Health and Class: Relaxation  Readiness to change: Maintenance: (Maintaining the behavior change)      OTHER CORE COMPONENTS     Tobacco:   Social History     Tobacco Use   Smoking Status Never Smoker   Smokeless Tobacco Never Used       Tobacco Use Intervention:   N/A:  Patient is a non-smoker     Anginal Symptoms:  None   NTG use: No prescription    Blood pressure:    Restin/60   Exercise: 148/80    Goals: consistent BP < 130/80, reduced dietary sodium <2300mg, moderate intensity exercise >150 mins/wk and medication compliance    Progression Toward Goals: Reviewed Pt goals and determined plan of care    Education:  understanding high blood pressure and it's relationship to CAD and low sodium diet and HTN  Plan: Class: Understanding Heart Disease and Class: Common Heart Medications  Readiness to change: Maintenance: (Maintaining the behavior change)

## 2022-11-11 ENCOUNTER — OFFICE VISIT (OUTPATIENT)
Dept: CARDIAC SURGERY | Facility: CLINIC | Age: 62
End: 2022-11-11

## 2022-11-11 VITALS
SYSTOLIC BLOOD PRESSURE: 104 MMHG | OXYGEN SATURATION: 97 % | WEIGHT: 180.2 LBS | BODY MASS INDEX: 23.77 KG/M2 | DIASTOLIC BLOOD PRESSURE: 69 MMHG | HEART RATE: 67 BPM

## 2022-11-11 DIAGNOSIS — I34.0 SEVERE MITRAL REGURGITATION: ICD-10-CM

## 2022-11-11 DIAGNOSIS — I51.1 ACUTE MITRAL REGURGITATION FROM CHORDAL RUPTURE (HCC): Primary | ICD-10-CM

## 2022-11-11 DIAGNOSIS — Z00.00 ROUTINE HEALTH MAINTENANCE: ICD-10-CM

## 2022-11-11 DIAGNOSIS — I48.92 ATRIAL FLUTTER, UNSPECIFIED TYPE (HCC): ICD-10-CM

## 2022-11-11 DIAGNOSIS — I48.92 ATRIAL FLUTTER WITH RAPID VENTRICULAR RESPONSE (HCC): ICD-10-CM

## 2022-11-11 DIAGNOSIS — I34.0 ACUTE MITRAL REGURGITATION FROM CHORDAL RUPTURE (HCC): Primary | ICD-10-CM

## 2022-11-11 RX ORDER — METOPROLOL SUCCINATE 25 MG/1
25 TABLET, EXTENDED RELEASE ORAL DAILY
Qty: 30 TABLET | Refills: 1 | Status: SHIPPED | OUTPATIENT
Start: 2022-11-11

## 2022-11-11 RX ORDER — OMEPRAZOLE 20 MG/1
20 CAPSULE, DELAYED RELEASE ORAL DAILY
COMMUNITY
Start: 2022-10-11

## 2022-11-11 NOTE — LETTER
November 11, 2022     Real Passer, [address]  [address] [address] [address]    Patient: Ruthie Charles   YOB: 1960   Date of Visit: 11/11/2022       Dear Dr Diaz Ferguson:    Thank you for referring Ruthie Charles to me for evaluation  Below are my notes for this consultation  If you have questions, please do not hesitate to call me  I look forward to following your patient along with you  Sincerely,        Bety Villavicencio MD        CC: Branda Dancer, DO [place], PA-C  11/11/2022  4:39 PM  Attested  Procedure: S/P mitral valve repair and ASD repair, performed on 10/7/22    History: Ruthie Charles is a 60y o  year old male who presents to our office today for routine post-surgical follow up care  He underwent an emergent ASD repair and MV repair on 10/7/22  His postoperative course was significant for fevers of unknown origin, requiring antibiotic therapy  His fevers resolved, cultures were negative and his antibiotic course was completed  He was discharged home on POD # 4  He was readmitted to [address]  [facility] from 10/26 - 10/28/22 due to rapid atrial flutter  He required a diltiazem infusion and was successfully cardioverted  He was started on Eliquis and discharged home  Since he has been home, Mr Alejandra Israel reports he is doing very well  He has followed up with his cardiologist and PCP  He denies chest pain, SOB, palpitations, LE edema, PND, orthopnea, syncope, fevers or incisional drainage  He has been walking daily and increasing his distance  He had his initial visit with cardiac rehab on November 7th and is scheduled to begin his therapy this coming Tuesday  Vital Signs:   Vitals:    11/11/22 1550   BP: 104/69   BP Location: Right arm   Patient Position: Sitting   Cuff Size: Standard   Pulse: 67   SpO2: 97%   Weight: 81 7 kg (180 lb 3 2 oz)       Home Medications:   Prior to Admission medications    Medication Sig Start Date End Date Taking?  Authorizing Provider acetaminophen (TYLENOL) 325 mg tablet Take 2 tablets (650 mg total) by mouth every 6 (six) hours as needed for mild pain 10/11/22   Chandan Poole PA-C   amiodarone 200 mg tablet Take 1 tablet (200 mg total) by mouth daily 10/28/22   Sulaiman Sánchez MD   apixaban (Eliquis) 5 mg Take 1 tablet (5 mg total) by mouth 2 (two) times a day 10/27/22   Sulaiman Sánchez MD   Ascorbic Acid (vitamin C) 1000 MG tablet every 24 hours    Historical Provider, MD   atorvastatin (LIPITOR) 40 mg tablet Take 1 tablet (40 mg total) by mouth daily with dinner 10/11/22   Chandan Poole PA-C   Cyanocobalamin (Vitamin B-12) 500 MCG LOZG every 24 hours    Historical Provider, MD   Lysine 1000 MG TABS     Historical Provider, MD   metoprolol succinate (TOPROL-XL) 25 mg 24 hr tablet Take 1 tablet (25 mg total) by mouth daily Do not start before October 29, 2022  10/29/22   Sulaiman Sánchez MD   Multiple Vitamin (MULTI-VITAMIN DAILY PO)     Historical Provider, MD   Omega-3 Fatty Acids (Fish Oil) 500 MG CAPS Every 12 hours    Historical Provider, MD   omeprazole (PriLOSEC OTC) 20 MG tablet Take 1 tablet (20 mg total) by mouth daily 10/11/22   Apple Glasgow PA-C       Physical Exam:    HEENT/NECK:  Normocephalic  Atraumatic  No jugular venous distention  Cardiac: Regular rate and rhythm and No murmurs/rubs/gallops  Pulmonary:  Breath sounds clear bilaterally and No rales/rhonchi/wheezes  Abdomen:  Non-tender, Non-distended and Normal bowel sounds  Incisions: Sternum is stable  Incision is clean, dry, and intact  Extremities: Extremities warm/dry and No edema B/L  Neuro: Alert and oriented X 3  Sensation is grossly intact  No focal deficits  Skin: Warm/Dry, without rashes or lesions      Lab Results:               Invalid input(s): LABGLOM      Lab Results   Component Value Date    HGBA1C 5 2 10/07/2022     No results found for: CKTOTAL, CKMB, CKMBINDEX, TROPONINI    Imaging Studies:     CXR PA/LAT 10/26/22: Status post mitral valve replacement  No acute cardiopulmonary disease  Previous right-sided infiltrates have resolved    I have personally reviewed pertinent reports  and I have personally reviewed pertinent films in PACS    Assessment: Congenital ASD, acute severe Mitral regurgitation  S/P mitral valve repair and ASD repair    Plan:     Kristian King continues to recover well following surgery  To date they have made progressive improvements physical rehabilitation  At this point I have cleared them to begin outpatient cardiac rehabilitation and have encouraged them to to do so  Kristian King has also been cleared to resume driving at this point  I asked that them do so in progressive increments  I did remind them of their ongoing lifting restrictions of 25 pounds for an additional 2 months  Kristian King has already been evaluated by their primary care physician cardiologist for ongoing medical care  At this point I have not scheduled them for routine followup care with our office  I have advised them to call with any new concerns that may arise  Kristian King was comfortable with our recommendations and their questions were answered to their satisfaction  The patient was referred to gastroenterology for consideration of routine colonoscopy screening of all patients aged 54-65  Gastroenterology evaluation will not be necessary prior to any open heart procedures, and can be completed following surgical recovery  Jean PaulMurd[place], [place]  11/11/22    * This note was completed in part utilizing [employer] direct voice recognition software  Grammatical errors, random word insertion, spelling mistakes, and incomplete sentences may be an occasional consequence of the system secondary to software limitations, ambient noise and hardware issues  At the time of dictation, efforts were made to edit, clarify and /or correct errors   Please read the chart carefully and recognize, using context, where substitutions have occurred  If you have any questions or concerns about the context, text or information contained within the body of this dictation, please contact myself, the provider, for further clarification  Attestation signed by Shirley Flores MD at 11/11/2022  5:02 PM:  Pt seen and examined with advanced practitioner  I agree with the above assessment and plan  He looks and feels great after recent emergent mitral valve repair  He was readmitted for rapid atrial flutter and was started on Eliquis and underwent FLIP/Cardioversion  He appears to be in NSR at a rate of 84 currently  He will be starting cardiac rehab shortly  Notably, the FLIP performed at the time of his cardioversion demonstrated mild-moderate MR, which was a marked change from the FLIP performed prior to leaving the operating room, which was graded at "trace"  I suspect the rapid atrial flutter may have contributed to the change  As noted above, the patient looks great and is feeling fantastic  On exam he does have a low grade (2/6) systolic murmur, which is only audible at the apex  I explained the above to the patient and his wife  I've recommended that he continue his postoperative recovery as planned with cardiac rehab  His cardiologist will determine when he can come off anticoagulation if indeed his AFlutter is gone  I'll follow up with his cardiologist as well to make sure he/she is aware of the above findings, which will need to be monitored          Tameka Carpio MD  DATE: November 11, 2022  TIME: 4:56 PM

## 2022-11-11 NOTE — PROGRESS NOTES
Procedure: S/P mitral valve repair and ASD repair, performed on 10/7/22    History: Giovanny Pena is a 60y o  year old male who presents to our office today for routine post-surgical follow up care  He underwent an emergent ASD repair and MV repair on 10/7/22  His postoperative course was significant for fevers of unknown origin, requiring antibiotic therapy  His fevers resolved, cultures were negative and his antibiotic course was completed  He was discharged home on POD # 4  He was readmitted to [facility] [address]  [facility] from 10/26 - 10/28/22 due to rapid atrial flutter  He required a diltiazem infusion and was successfully cardioverted  He was started on Eliquis and discharged home  Since he has been home, Mr Mony Garay reports he is doing very well  He has followed up with his cardiologist and PCP  He denies chest pain, SOB, palpitations, LE edema, PND, orthopnea, syncope, fevers or incisional drainage  He has been walking daily and increasing his distance  He had his initial visit with cardiac rehab on November 7th and is scheduled to begin his therapy this coming Tuesday  Vital Signs:   Vitals:    11/11/22 1550   BP: 104/69   BP Location: Right arm   Patient Position: Sitting   Cuff Size: Standard   Pulse: 67   SpO2: 97%   Weight: 81 7 kg (180 lb 3 2 oz)       Home Medications:   Prior to Admission medications    Medication Sig Start Date End Date Taking?  Authorizing Provider   acetaminophen (TYLENOL) 325 mg tablet Take 2 tablets (650 mg total) by mouth every 6 (six) hours as needed for mild pain 10/11/22   Sandi Poole PA-C   amiodarone 200 mg tablet Take 1 tablet (200 mg total) by mouth daily 10/28/22   Maribel Harmon MD   apixaban (Eliquis) 5 mg Take 1 tablet (5 mg total) by mouth 2 (two) times a day 10/27/22   Maribel Harmon MD   Ascorbic Acid (vitamin C) 1000 MG tablet every 24 hours    Historical Provider, MD   atorvastatin (LIPITOR) 40 mg tablet Take 1 tablet (40 mg total) by mouth daily with dinner 10/11/22   Aren Poole PA-C   Cyanocobalamin (Vitamin B-12) 500 MCG LOZG every 24 hours    Historical Provider, MD   Lysine 1000 MG TABS     Historical Provider, MD   metoprolol succinate (TOPROL-XL) 25 mg 24 hr tablet Take 1 tablet (25 mg total) by mouth daily Do not start before October 29, 2022  10/29/22   Nba President, MD   Multiple Vitamin (MULTI-VITAMIN DAILY PO)     Historical Provider, MD   Omega-3 Fatty Acids (Fish Oil) 500 MG CAPS Every 12 hours    Historical Provider, MD   omeprazole (PriLOSEC OTC) 20 MG tablet Take 1 tablet (20 mg total) by mouth daily 10/11/22   Aretha Sue PA-C       Physical Exam:    HEENT/NECK:  Normocephalic  Atraumatic  No jugular venous distention  Cardiac: Regular rate and rhythm and No murmurs/rubs/gallops  Pulmonary:  Breath sounds clear bilaterally and No rales/rhonchi/wheezes  Abdomen:  Non-tender, Non-distended and Normal bowel sounds  Incisions: Sternum is stable  Incision is clean, dry, and intact  Extremities: Extremities warm/dry and No edema B/L  Neuro: Alert and oriented X 3  Sensation is grossly intact  No focal deficits  Skin: Warm/Dry, without rashes or lesions  Lab Results:               Invalid input(s): LABGLOM      Lab Results   Component Value Date    HGBA1C 5 2 10/07/2022     No results found for: CKTOTAL, CKMB, CKMBINDEX, TROPONINI    Imaging Studies:     CXR PA/LAT 10/26/22: Status post mitral valve replacement  No acute cardiopulmonary disease  Previous right-sided infiltrates have resolved    I have personally reviewed pertinent reports  and I have personally reviewed pertinent films in PACS    Assessment: Congenital ASD, acute severe Mitral regurgitation  S/P mitral valve repair and ASD repair    Plan:     Arcadio Blandon continues to recover well following surgery  To date they have made progressive improvements physical rehabilitation   At this point I have cleared them to begin outpatient cardiac rehabilitation and have encouraged them to to do so  Regina Porter has also been cleared to resume driving at this point  I asked that them do so in progressive increments  I did remind them of their ongoing lifting restrictions of 25 pounds for an additional 2 months  Regina Porter has already been evaluated by their primary care physician cardiologist for ongoing medical care  At this point I have not scheduled them for routine followup care with our office  I have advised them to call with any new concerns that may arise  Regina Porter was comfortable with our recommendations and their questions were answered to their satisfaction  The patient was referred to gastroenterology for consideration of routine colonoscopy screening of all patients aged 54-65  Gastroenterology evaluation will not be necessary prior to any open heart procedures, and can be completed following surgical recovery  Mare [place], [place]  11/11/22    * This note was completed in part utilizing [employer] direct voice recognition software  Grammatical errors, random word insertion, spelling mistakes, and incomplete sentences may be an occasional consequence of the system secondary to software limitations, ambient noise and hardware issues  At the time of dictation, efforts were made to edit, clarify and /or correct errors  Please read the chart carefully and recognize, using context, where substitutions have occurred  If you have any questions or concerns about the context, text or information contained within the body of this dictation, please contact myself, the provider, for further clarification

## 2022-11-14 DIAGNOSIS — I48.92 ATRIAL FLUTTER (HCC): ICD-10-CM

## 2022-11-14 RX ORDER — AMIODARONE HYDROCHLORIDE 200 MG/1
200 TABLET ORAL DAILY
Qty: 90 TABLET | Refills: 1 | Status: SHIPPED | OUTPATIENT
Start: 2022-11-14 | End: 2022-11-21 | Stop reason: SDUPTHER

## 2022-11-15 ENCOUNTER — CLINICAL SUPPORT (OUTPATIENT)
Dept: CARDIAC REHAB | Facility: HOSPITAL | Age: 62
End: 2022-11-15

## 2022-11-15 DIAGNOSIS — Z98.890 S/P MVR (MITRAL VALVE REPAIR): Primary | ICD-10-CM

## 2022-11-17 ENCOUNTER — CLINICAL SUPPORT (OUTPATIENT)
Dept: CARDIAC REHAB | Facility: HOSPITAL | Age: 62
End: 2022-11-17

## 2022-11-17 DIAGNOSIS — Z98.890 S/P MVR (MITRAL VALVE REPAIR): Primary | ICD-10-CM

## 2022-11-18 ENCOUNTER — APPOINTMENT (OUTPATIENT)
Dept: CARDIAC REHAB | Facility: HOSPITAL | Age: 62
End: 2022-11-18

## 2022-11-18 ENCOUNTER — CLINICAL SUPPORT (OUTPATIENT)
Dept: CARDIAC REHAB | Facility: HOSPITAL | Age: 62
End: 2022-11-18

## 2022-11-18 DIAGNOSIS — Z98.890 S/P MVR (MITRAL VALVE REPAIR): Primary | ICD-10-CM

## 2022-11-21 ENCOUNTER — OFFICE VISIT (OUTPATIENT)
Dept: CARDIOLOGY CLINIC | Facility: CLINIC | Age: 62
End: 2022-11-21

## 2022-11-21 VITALS
DIASTOLIC BLOOD PRESSURE: 74 MMHG | HEART RATE: 89 BPM | HEIGHT: 73 IN | BODY MASS INDEX: 23.83 KG/M2 | WEIGHT: 179.8 LBS | SYSTOLIC BLOOD PRESSURE: 106 MMHG

## 2022-11-21 DIAGNOSIS — I48.92 ATRIAL FLUTTER WITH RAPID VENTRICULAR RESPONSE (HCC): ICD-10-CM

## 2022-11-21 DIAGNOSIS — I48.92 ATRIAL FLUTTER, UNSPECIFIED TYPE (HCC): Primary | ICD-10-CM

## 2022-11-21 DIAGNOSIS — I48.92 ATRIAL FLUTTER (HCC): ICD-10-CM

## 2022-11-21 DIAGNOSIS — E78.5 HYPERLIPIDEMIA: ICD-10-CM

## 2022-11-21 RX ORDER — EVENING PRIMROSE OIL 500 MG
100 CAPSULE ORAL DAILY
COMMUNITY

## 2022-11-21 RX ORDER — AMIODARONE HYDROCHLORIDE 200 MG/1
200 TABLET ORAL DAILY
Qty: 90 TABLET | Refills: 3 | Status: SHIPPED | OUTPATIENT
Start: 2022-11-21

## 2022-11-21 NOTE — PROGRESS NOTES
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   · TTE 10/07/2022 EF 70%, MV posterior leaflet is flail with about 1 1 cm gap to anterior leaflet, small segment of torn chordae tendineae is appreciated  · Underwent urgent transfer from [facility] for [address] 10/7/22 (no evidence of CAD) w/ AIBP placement then proceeded for MV repair  · S/P ASD and MV repair w/ #34mm Ortiz physio II ring  · Placed on ASA 325mg QD and Lopressor 50mg BID post-op  · FLIP 10/27/2022 EF 65%, small and patent PFO with predominant left-to-right shunting, no THAI thrombus, MV annular ring with remaining flail chordae prolapsing into left atrium through mitral valve, mild-to-moderate MR, mild TR   2   Paroxysmal atrial flutter   · Admitted to [facility] 10/26/22 new onset atrial flutter   · Placed on IV Cardizem w/o significant response s/p DCCV 10/27/2022 with conversion to NSR  · Post-CV switched ASA 325mg > Eliquis 5mg BID  · IV Cardizem > PO amiodarone 200mg BID x1 wk then 200mg daily on discharge  · In office EKG 11/21/2022- NSR, HR 89, QTc 469  3  Hyperlipidemia   · FLP 10/7/2022 , TG 77, ,   · On atorvastatin 40mg daily     PLAN:  · Maintaining NSR on EKG today -- continue amiodarone 200mg QD and Toprol XL 25mg QD  · Continue Eliquis 5mg BID   · Obtain LFT and TSH prior to his next visit - consider discontinuing his amiodarone and Eliquis in the future if he does not have any additional episodes of aflutter or his lab-work is worse  · Check repeat FLP after 6 months of statin therapy   · He does not have any increased weight gain or CHF symptoms - he was placed on short term diuretics post-op but has not needed any additional doses since then  · Salt/fluid restrictions (DASH diet) and exercise PRN was discussed, he completed 1 week of cardiac rehab thus far and denies any issues or symptoms    · Reports of snoring/gasping while sleeping -- check home sleep study to assess for any underlying KELLY    Interval History/ HPI:   Shannan Redmond is a 54-year-old male with PMH as above who presents for office  follow-up visit  Patient reports he is doing very well from a cardiovascular standpoint and denies any episodes of chest pain, discomfort, palpitations, shortness of breath, orthopnea, PND or lower extremity edema  Patient recently had office CTS follow-up, maintaining NSR and cleared for cardiopulmonary rehab  No follow up currently planned  Reports he started rehab last week and denies any issues or symptoms with exertion  He has noticed a small red area at the superior portion of his sternotomy scar  Non-tender with any drainage  Present for a few days  Not bothersome  Vitals:  /74  HR 89  Wt 179lbs (184lbs last visit 10/25/2022)    Past Medical History:   Diagnosis Date   • ASD (atrial septal defect)     s/p repair   • Hyperlipidemia    • Severe mitral regurgitation     s/p repair     Social History     Socioeconomic History   • Marital status: /Civil Union     Spouse name: Not on file   • Number of children: Not on file   • Years of education: Not on file   • Highest education level: Not on file   Occupational History   • Not on file   Tobacco Use   • Smoking status: Never   • Smokeless tobacco: Never   Vaping Use   • Vaping Use: Never used   Substance and Sexual Activity   • Alcohol use: Not Currently   • Drug use: Not Currently   • Sexual activity: Not on file   Other Topics Concern   • Not on file   Social History Narrative   • Not on file     Social Determinants of Health     Financial Resource Strain: Not on file   Food Insecurity: No Food Insecurity   • Worried About Running Out of Food in the Last Year: Never true   • Ran Out of Food in the Last Year: Never true   Transportation Needs: No Transportation Needs   • Lack of Transportation (Medical):  No   • Lack of Transportation (Non-Medical): No   Physical Activity: Not on file   Stress: Not on file   Social Connections: Not on file   Intimate Partner Violence: Not on file   Housing Stability: Low Risk    • Unable to Pay for Housing in the Last Year: No   • Number of Places Lived in the Last Year: 1   • Unstable Housing in the Last Year: No      No family history on file  Past Surgical History:   Procedure Laterality Date   • ATRIAL SEPTECTOMY  10/7/2022    Procedure: REPAIR ATRIAL SEPTAL DEFECT (ASD); Surgeon: Roel Hay MD;  Location: [facility];  Service: Cardiac Surgery   • CARDIAC CATHETERIZATION N/A 10/7/2022    Procedure: CARDIAC CATHETERIZATION;  Surgeon: Maged Ashton DO;  Location: [facility]; Service: Cardiology   • CARDIAC CATHETERIZATION N/A 10/7/2022    Procedure: Cardiac Coronary Angiogram;  Surgeon: Maged Ashton DO;  [facility]: [facility]; Service: Cardiology   • CARDIAC CATHETERIZATION N/A 10/7/2022    Procedure: Cardiac iabp; Surgeon: Maged Ashton DO;  Location: [facility];   Service: Cardiology   • INGUINAL HERNIA REPAIR Left     unsure mesh placement   • HI REPLACEMENT OF MITRAL VALVE N/A 10/7/2022    Procedure: MITRAL VALVE REPAIR WITH 34MM PHYSIO II ANNULOPLASTY RING;  Surgeon: Roel Hay MD;  Location: [facility];  Service: Cardiac Surgery       Current Outpatient Medications:   •  amiodarone 200 mg tablet, Take 1 tablet (200 mg total) by mouth daily, Disp: 90 tablet, Rfl: 1  •  apixaban (Eliquis) 5 mg, Take 1 tablet (5 mg total) by mouth 2 (two) times a day, Disp: 60 tablet, Rfl: 0  •  Ascorbic Acid (vitamin C) 1000 MG tablet, every 24 hours, Disp: , Rfl:   •  atorvastatin (LIPITOR) 40 mg tablet, Take 1 tablet (40 mg total) by mouth daily with dinner, Disp: 30 tablet, Rfl: 3  •  Cyanocobalamin (Vitamin B-12) 500 MCG LOZG, every 24 hours, Disp: , Rfl:   •  Lysine 1000 MG TABS, , Disp: , Rfl:   •  metoprolol succinate (TOPROL-XL) 25 mg 24 hr tablet, Take 1 tablet (25 mg total) by mouth daily Do not start before October 29, 2022 , Disp: 30 tablet, Rfl: 0  •  metoprolol succinate (TOPROL-XL) 25 mg 24 hr tablet, Take 1 tablet (25 mg total) by mouth daily, Disp: 30 tablet, Rfl: 1  •  Multiple Vitamin (MULTI-VITAMIN DAILY PO), , Disp: , Rfl:   •  Omega-3 Fatty Acids (Fish Oil) 500 MG CAPS, Every 12 hours, Disp: , Rfl:   •  Vitamin E 45 MG (100 UNIT) CAPS, Take 100 Units by mouth daily, Disp: , Rfl:   •  omeprazole (PriLOSEC OTC) 20 MG tablet, Take 1 tablet (20 mg total) by mouth daily (Patient not taking: Reported on 11/21/2022), Disp: 30 tablet, Rfl: 0  •  omeprazole (PriLOSEC) 20 mg delayed release capsule, Take 20 mg by mouth daily (Patient not taking: Reported on 11/21/2022), Disp: , Rfl:     Review of Systems:  Review of Systems   Constitutional: Negative for appetite change, chills, diaphoresis, fatigue and fever  Respiratory: Negative for cough, chest tightness and shortness of breath  Cardiovascular: Negative for chest pain, palpitations and leg swelling  Gastrointestinal: Negative for diarrhea, nausea and vomiting  Endocrine: Negative for cold intolerance and heat intolerance  Genitourinary: Negative for difficulty urinating, dysuria and enuresis  Musculoskeletal: Negative for arthralgias, back pain and gait problem  Allergic/Immunologic: Negative for environmental allergies and food allergies  Neurological: Negative for dizziness, facial asymmetry and headaches  Hematological: Negative for adenopathy  Does not bruise/bleed easily  Psychiatric/Behavioral: Negative for agitation, behavioral problems and confusion  Physical Exam:  Physical Exam  Constitutional:       Appearance: He is well-developed and well-nourished  HENT:      Right Ear: External ear normal       Left Ear: External ear normal    Eyes:      Extraocular Movements: EOM normal       Pupils: Pupils are equal, round, and reactive to light     Cardiovascular: Rate and Rhythm: Normal rate and regular rhythm  Heart sounds: Murmur heard  No friction rub  No gallop  Pulmonary:      Effort: Pulmonary effort is normal       Breath sounds: Normal breath sounds  Abdominal:      Palpations: Abdomen is soft  Musculoskeletal:         General: Normal range of motion  Cervical back: Normal range of motion  Skin:     General: Skin is warm and dry  Neurological:      Mental Status: He is alert and oriented to person, place, and time  Deep Tendon Reflexes: Reflexes are normal and symmetric  Psychiatric:         Mood and Affect: Mood and affect normal          Behavior: Behavior normal          Thought Content: Thought content normal          Judgment: Judgment normal          This note was completed in part utilizing M-Modal Fluency Direct Software  Grammatical errors, random word insertions, spelling mistakes, and incomplete sentences can be an occasional consequence of this system secondary to software limitations, ambient noise, and hardware issues  If you have any questions or concerns about the content, text, or information contained within the body of this dictation, please contact the provider for clarification

## 2022-11-22 ENCOUNTER — CLINICAL SUPPORT (OUTPATIENT)
Dept: CARDIAC REHAB | Facility: HOSPITAL | Age: 62
End: 2022-11-22

## 2022-11-22 ENCOUNTER — APPOINTMENT (OUTPATIENT)
Dept: CARDIAC REHAB | Facility: HOSPITAL | Age: 62
End: 2022-11-22

## 2022-11-22 DIAGNOSIS — Z98.890 S/P MVR (MITRAL VALVE REPAIR): Primary | ICD-10-CM

## 2022-11-25 ENCOUNTER — CLINICAL SUPPORT (OUTPATIENT)
Dept: CARDIAC REHAB | Facility: HOSPITAL | Age: 62
End: 2022-11-25

## 2022-11-25 ENCOUNTER — APPOINTMENT (OUTPATIENT)
Dept: CARDIAC REHAB | Facility: HOSPITAL | Age: 62
End: 2022-11-25

## 2022-11-25 DIAGNOSIS — Z98.890 S/P MVR (MITRAL VALVE REPAIR): Primary | ICD-10-CM

## 2022-11-29 ENCOUNTER — APPOINTMENT (OUTPATIENT)
Dept: CARDIAC REHAB | Facility: HOSPITAL | Age: 62
End: 2022-11-29

## 2022-12-01 ENCOUNTER — CLINICAL SUPPORT (OUTPATIENT)
Dept: CARDIAC REHAB | Facility: HOSPITAL | Age: 62
End: 2022-12-01

## 2022-12-01 DIAGNOSIS — Z98.890 S/P MVR (MITRAL VALVE REPAIR): Primary | ICD-10-CM

## 2022-12-02 ENCOUNTER — CLINICAL SUPPORT (OUTPATIENT)
Dept: CARDIAC REHAB | Facility: HOSPITAL | Age: 62
End: 2022-12-02

## 2022-12-02 DIAGNOSIS — Z98.890 S/P MVR (MITRAL VALVE REPAIR): Primary | ICD-10-CM

## 2022-12-06 ENCOUNTER — TELEPHONE (OUTPATIENT)
Dept: SLEEP CENTER | Facility: CLINIC | Age: 62
End: 2022-12-06

## 2022-12-06 ENCOUNTER — CLINICAL SUPPORT (OUTPATIENT)
Dept: CARDIAC REHAB | Facility: HOSPITAL | Age: 62
End: 2022-12-06

## 2022-12-06 DIAGNOSIS — Z98.890 S/P MVR (MITRAL VALVE REPAIR): Primary | ICD-10-CM

## 2022-12-06 PROBLEM — R65.20 SEVERE SEPSIS (HCC): Status: RESOLVED | Noted: 2022-10-07 | Resolved: 2022-12-06

## 2022-12-06 PROBLEM — A41.9 SEVERE SEPSIS (HCC): Status: RESOLVED | Noted: 2022-10-07 | Resolved: 2022-12-06

## 2022-12-06 PROBLEM — J18.9 MULTIFOCAL PNEUMONIA: Status: RESOLVED | Noted: 2022-10-07 | Resolved: 2022-12-06

## 2022-12-06 NOTE — TELEPHONE ENCOUNTER
----- Message from Asif Teran MD sent at 12/6/2022  1:57 PM EST -----  Approved    ----- Message -----  From: Juanpablo Jaime  Sent: 12/5/2022   8:25 AM EST  To: Sleep Medicine [facility] Provider    This Home sleep study needs approval      If approved please sign and return to clerical pool  If denied please include reasons why  Also provide alternative testing if warranted  Please sign and return to clerical pool

## 2022-12-06 NOTE — PROGRESS NOTES
Cardiac Rehabilitation Plan of Care   30 Day Reassessment          Today's date: 2022   # of Exercise Sessions Completed: 9  Patient name: Kem Calderon      : 1960  Age: 60 y o  MRN: [id number]  Referring Physician: Yeni Dewitt,*  Cardiologist: Dr Yan Taylor  Provider: Rebel  Clinician: Newton Nolan MS, CEP      Dx:   Encounter Diagnosis   Name Primary? • S/P MVR (mitral valve repair) Yes     Date of onset: 10/7/2022      SUMMARY OF PROGRESS:  Mr Geovanna Gillespie has started his cardiac rehabilitation program after recent MVR on 10/7/2022  He has completed 9 sessions of his cardiac rehab program over the past 30 days, attending regularly 3x/week  Overall he reports he has been doing well with recovery and is feeling great  He is noticing increased strength and endurance since starting rehab and says he is very happy to be participating in cardiac rehab program for physical and mental health  He feels with monitoring he is feeling more confident with pushing himself during exercise sessions  He continues to walk at home along with exercise at rehab sessions 20-30 min daily  He is currently completing 50 min of aerobic exercise at 3 5-5 2 METs during rehab sessions  He tolerates exercise well and is progressing exercise times and intensities appropriately  He will be starting a strength training program 2x/week to start increasing upper body strength  He has normal hemodynamic response to exercise  His goals for his rehab program are to be able to walk on incline on TM with less effort as endurance is built with exercise, and improve overall strength and be able to return to working in yard without limitations  He reports he is on target with his goals at 30 days  He notices significant increase in strength just within the past 4 weeks and is returning to his normal activities without difficulty  He is doing well with his diet and is making healthy choices     He denies depression (PHQ-9=0) and anxiety (GOLD-7=0) at this time  He reports he has excellent emotional support from his wife and family          Medication compliance: Yes   Comments: Pt reports to be compliant with medications  Fall Risk: Low   Comments: Ambulates with a steady gait with no assist device    EKG Interpretation: NSR      EXERCISE ASSESSMENT and PLAN    Exercise Prescription:      Frequency: 3 days/week   Supplement with home exercise 2+ days/wk as tolerated       Minutes: 40-50         METS: 3 5-5 2           HR: 30 beats above resting   RPE: 4-6         Modalities: Treadmill, UBE, Lifecycle, Elliptical and Recumbent bike      30 Day Goals for Exercise Progression:    Frequency: 3 days/week of cardiac rehab       Supplement with home exercise 2+ days/wk as tolerated    Minutes: 50-60                              >150 mins/wk of moderate intensity exercise   METS: 4 0-6 0   HR: 30 beats above resting    RPE: 4-6   Modalities: Treadmill, UBE, Lifecycle, Elliptical, Rower and Recumbent bike    Strength trainin-3 days / week  12-15 repetitions  1-2 sets per modality   Will be added following at least 8 weeks post surgery and 8-10 monitored sessions   Modalities: Pull Downs, Lateral Raise, Arm Extension, Arm Curl, Upright Rows, Front Raises, Shoulder Shrugs and leg ext    Home Exercise: Type: walking, Frequency: 3-4 days/week    Goals: 10% improvement in functional capacity - based on max METs achieved in fitness assessment, improved DASI score by 10%, Increase in exercise capacity by 40% - based on peak METs tolerated in cardiac rehab exercise session, Exercise 5 days/wk, >150mins/wk of moderate intensity exercise, Resume ADLs with increased strength, Attend Rehab regularly and start a home exercise program    Progression Toward Goals:  Pt is progressing and showing improvement  toward the following goals:  attending cr sessions regularly 3x/week, increasing strength and endurance with exercise, increasing functional capacity shown by increased MET levels, walking at home for home exercise plan, feeling confident with exercise and pushing himself to increase heart rate with exercise  Education: benefit of exercise for CAD risk factors, home exercise guidelines, AHA guidelines to achieve >150 mins/wk of moderate exercise and RPE scale   Plan:education on home exercise guidelines, home exercise 30+ mins 2 days opposite CR and Education class: Risk Factors for Heart Disease  Readiness to change: Action:  (Changing behavior)      NUTRITION ASSESSMENT AND PLAN    Weight control:    Starting weight: 177 lb   Current weight:   174 lb    Diabetes: N/A  A1c: 5 2    last measured: 10/7/2022    Lipid management: Discussed diet and lipid management and Last lipid profile 10/7/2022  Chol 245  TRG 77        Goals:LDL <100, HDL >40, TRG <150 and CHOL <200    Progression Toward Goals: Pt is progressing and showing improvement  toward the following goals:  doing well with following heart healthy diet  Decreasing fat and salt intake           Education: heart healthy eating  low sodium diet  hydration  Plan: Education class: Reading Food Labels and Education Class: Heart Healthy Eating  Readiness to change: Action:  (Changing behavior)      PSYCHOSOCIAL ASSESSMENT AND PLAN    Emotional:  Depression assessment:  PHQ-9 = 0  0 =No Depression            Anxiety measure:  GOLD-7 = 0  0-4  = Not anxious  Self-reported stress level:  4  Social support: Excellent    Goals:  Physical Fitness in [facility] Score < 3    Progression Toward Goals: Pt is progressing and showing improvement  toward the following goals:  no concerns with depression or anxiety at this time  Has great support from wife and family          Education: signs/sxs of depression and benefits of a positive support system  Plan: Class: Stress and Your Health and Class: Relaxation  Readiness to change: Maintenance: (Maintaining the behavior change)      OTHER CORE COMPONENTS     Tobacco:   Social History     Tobacco Use   Smoking Status Never   Smokeless Tobacco Never       Tobacco Use Intervention:   N/A:  Patient is a non-smoker     Anginal Symptoms:  None   NTG use: No prescription    Blood pressure:    Restin//70   Exercise: 148//88    Goals: consistent BP < 130/80, reduced dietary sodium <2300mg, moderate intensity exercise >150 mins/wk and medication compliance    Progression Toward Goals: Pt is progressing and showing improvement  toward the following goals:  BP is stable within normal resting limits, has normal hemodynamic response to exercise, watching salt intake, taking medications as prescribed          Education:  understanding high blood pressure and it's relationship to CAD and low sodium diet and HTN  Plan: Class: Understanding Heart Disease and Class: Common Heart Medications  Readiness to change: Maintenance: (Maintaining the behavior change)

## 2022-12-08 ENCOUNTER — CLINICAL SUPPORT (OUTPATIENT)
Dept: CARDIAC REHAB | Facility: HOSPITAL | Age: 62
End: 2022-12-08

## 2022-12-08 DIAGNOSIS — Z98.890 S/P MVR (MITRAL VALVE REPAIR): Primary | ICD-10-CM

## 2022-12-09 ENCOUNTER — CLINICAL SUPPORT (OUTPATIENT)
Dept: CARDIAC REHAB | Facility: HOSPITAL | Age: 62
End: 2022-12-09

## 2022-12-09 DIAGNOSIS — Z98.890 S/P MVR (MITRAL VALVE REPAIR): Primary | ICD-10-CM

## 2022-12-13 ENCOUNTER — CLINICAL SUPPORT (OUTPATIENT)
Dept: CARDIAC REHAB | Facility: HOSPITAL | Age: 62
End: 2022-12-13

## 2022-12-13 DIAGNOSIS — Z98.890 S/P MVR (MITRAL VALVE REPAIR): Primary | ICD-10-CM

## 2022-12-15 ENCOUNTER — CLINICAL SUPPORT (OUTPATIENT)
Dept: CARDIAC REHAB | Facility: HOSPITAL | Age: 62
End: 2022-12-15

## 2022-12-15 DIAGNOSIS — Z98.890 S/P MVR (MITRAL VALVE REPAIR): Primary | ICD-10-CM

## 2022-12-16 ENCOUNTER — CLINICAL SUPPORT (OUTPATIENT)
Dept: CARDIAC REHAB | Facility: HOSPITAL | Age: 62
End: 2022-12-16

## 2022-12-16 DIAGNOSIS — Z98.890 S/P MVR (MITRAL VALVE REPAIR): Primary | ICD-10-CM

## 2022-12-20 ENCOUNTER — APPOINTMENT (OUTPATIENT)
Dept: CARDIAC REHAB | Facility: HOSPITAL | Age: 62
End: 2022-12-20

## 2022-12-21 ENCOUNTER — HOSPITAL ENCOUNTER (OUTPATIENT)
Dept: SLEEP CENTER | Facility: HOSPITAL | Age: 62
Discharge: HOME | End: 2022-12-21

## 2022-12-21 DIAGNOSIS — I48.92 ATRIAL FLUTTER, UNSPECIFIED TYPE (HCC): ICD-10-CM

## 2022-12-22 ENCOUNTER — CLINICAL SUPPORT (OUTPATIENT)
Dept: CARDIAC REHAB | Facility: HOSPITAL | Age: 62
End: 2022-12-22

## 2022-12-22 DIAGNOSIS — Z98.890 S/P MVR (MITRAL VALVE REPAIR): Primary | ICD-10-CM

## 2022-12-23 ENCOUNTER — CLINICAL SUPPORT (OUTPATIENT)
Dept: CARDIAC REHAB | Facility: HOSPITAL | Age: 62
End: 2022-12-23

## 2022-12-23 DIAGNOSIS — Z98.890 S/P MVR (MITRAL VALVE REPAIR): Primary | ICD-10-CM

## 2022-12-24 NOTE — PROGRESS NOTES
Home Sleep Study Documentation    HOME STUDY DEVICE: Noxturnal no                                           Eleanor G3 yes      Pre-Sleep Home Study:    Set-up and instructions performed by: Reinaldo Ramirez performed demonstration for Patient: yes    Return demonstration performed by Patient: yes    Written instructions provided to Patient: yes    Patient signed consent form: yes        Post-Sleep Home Study:    Additional comments by Patient:     Home Sleep Study Failed:yes:     Failure reason: Inadequate data device     Reported or Detected: N/A    Scored by: SAMANTHA Andrea    Only 2 minutes and 22 second of data was recorded

## 2022-12-27 ENCOUNTER — TELEPHONE (OUTPATIENT)
Dept: SLEEP CENTER | Facility: CLINIC | Age: 62
End: 2022-12-27

## 2022-12-27 ENCOUNTER — CLINICAL SUPPORT (OUTPATIENT)
Dept: CARDIAC REHAB | Facility: HOSPITAL | Age: 62
End: 2022-12-27

## 2022-12-27 DIAGNOSIS — Z98.890 S/P MVR (MITRAL VALVE REPAIR): Primary | ICD-10-CM

## 2022-12-27 NOTE — TELEPHONE ENCOUNTER
I lm on both pt's phone #s  HST done in [facility] was a failed study  Unit did not record, most likely the belt was not pushed in all the way  Need to repeat study  Can offer [place] HSTs this week (add on to end of day) or [facility] Emergency spot on Fridays

## 2022-12-29 ENCOUNTER — CLINICAL SUPPORT (OUTPATIENT)
Dept: CARDIAC REHAB | Facility: HOSPITAL | Age: 62
End: 2022-12-29

## 2022-12-29 DIAGNOSIS — Z98.890 S/P MVR (MITRAL VALVE REPAIR): Primary | ICD-10-CM

## 2022-12-30 ENCOUNTER — HOSPITAL ENCOUNTER (OUTPATIENT)
Dept: SLEEP CENTER | Facility: HOSPITAL | Age: 62
Discharge: HOME | End: 2022-12-30

## 2022-12-30 ENCOUNTER — CLINICAL SUPPORT (OUTPATIENT)
Dept: CARDIAC REHAB | Facility: HOSPITAL | Age: 62
End: 2022-12-30

## 2022-12-30 DIAGNOSIS — Z98.890 S/P MVR (MITRAL VALVE REPAIR): Primary | ICD-10-CM

## 2022-12-31 NOTE — PROGRESS NOTES
Home Sleep Study Documentation    HOME STUDY DEVICE: Noxturnal yes                                           Eleanor G3 no      Pre-Sleep Home Study:    Set-up and instructions performed by: SAMANTHA Sheriff    Technician performed demonstration for Patient: yes    Return demonstration performed by Patient: yes    Written instructions provided to Patient: yes    Patient signed consent form: yes        Post-Sleep Home Study:    Additional comments by Patient: None    Home Sleep Study Failed:no:    Failure reason: N/A    Reported or Detected: N/A    Scored by: CHIRAG Hines

## 2023-01-03 ENCOUNTER — APPOINTMENT (OUTPATIENT)
Dept: CARDIAC REHAB | Facility: HOSPITAL | Age: 63
End: 2023-01-03

## 2023-01-03 ENCOUNTER — CLINICAL SUPPORT (OUTPATIENT)
Dept: CARDIAC REHAB | Facility: HOSPITAL | Age: 63
End: 2023-01-03

## 2023-01-03 ENCOUNTER — TELEPHONE (OUTPATIENT)
Dept: SLEEP CENTER | Facility: CLINIC | Age: 63
End: 2023-01-03

## 2023-01-03 DIAGNOSIS — Z98.890 S/P MVR (MITRAL VALVE REPAIR): Primary | ICD-10-CM

## 2023-01-03 NOTE — TELEPHONE ENCOUNTER
----- Message from Sintia Childress MD sent at 12/29/2022  2:06 PM EST -----  Approved    ----- Message -----  From: Cori Ordonez  Sent: 12/29/2022   9:28 AM EST  To: Sleep Medicine [facility] Provider    This home sleep study needs approval      If approved please sign and return to clerical pool  If denied please include reasons why  Also provide alternative testing if warranted  Please sign and return to clerical pool

## 2023-01-03 NOTE — PROGRESS NOTES
Cardiac Rehabilitation Plan of Care   60 Day Reassessment          Today's date: 1/3/2023   # of Exercise Sessions Completed: 20  Patient name: Bard Jessica      : 1960  Age: 60 y o  MRN: [id number]  Referring Physician: Fabienne Rosales,*  Cardiologist: Dr Jadiel Kramer  Provider: Rebel  Clinician: Agnes Vo MS, CEP      Dx:   Encounter Diagnosis   Name Primary? • S/P MVR (mitral valve repair) Yes     Date of onset: 10/7/2022      SUMMARY OF PROGRESS:  Mr Gwendolyn Norman has started his cardiac rehabilitation program after recent MVR on 10/7/2022  He has completed 20 sessions of his cardiac rehab program over the past 60 days, attending regularly 3x/week  Overall he reports he has been doing well with recovery and is feeling great  He is noticing increased strength and endurance since starting rehab and says he is very happy to be participating in cardiac rehab program for physical and mental health  He feels with monitoring he is feeling more confident with pushing himself during exercise sessions  He continues to walk at home along with exercise at rehab sessions 20-30 min daily  He is currently completing 50-60 min of aerobic exercise and has increased to 7 2-8 6 METs  He has started jogging intervals at 8 6 METs and is working towards increasing time intervals and speed  He tolerates exercise well and is progressing exercise times and intensities appropriately  He has started a strength training program 2x/week to increase upper body strength  He has normal hemodynamic response to exercise  Resting BP 96//86 and with exercise 98//86  His goals for his rehab program are to be able to walk on incline on TM with less effort as endurance is built with exercise, and improve overall strength and be able to return to working in yard without limitations  He reports he is on target with his goals at 60 days   He notices significant increase in strength and is returning to his normal activities without difficulty  He is doing well with his diet and is making healthy choices  He denies depression (PHQ-9=0) and anxiety (GOLD-7=0) at this time  He reports he has excellent emotional support from his wife and family          Medication compliance: Yes   Comments: Pt reports to be compliant with medications  Fall Risk: Low   Comments: Ambulates with a steady gait with no assist device    EKG Interpretation: NSR      EXERCISE ASSESSMENT and PLAN    Exercise Prescription:      Frequency: 3 days/week   Supplement with home exercise 2+ days/wk as tolerated       Minutes: 50-60         METS: 7 2-8 6           HR: 30 beats above resting   RPE: 4-6         Modalities: Treadmill, UBE, Lifecycle, Elliptical and Recumbent bike      30 Day Goals for Exercise Progression:    Frequency: 3 days/week of cardiac rehab       Supplement with home exercise 2+ days/wk as tolerated    Minutes: 50-60                              >150 mins/wk of moderate intensity exercise   METS: 7 0-9 0   HR: 30 beats above resting    RPE: 4-6   Modalities: Treadmill, UBE, Lifecycle, Elliptical, Rower and Recumbent bike    Strength trainin-3 days / week  12-15 repetitions  1-2 sets per modality   Will be added following at least 8 weeks post surgery and 8-10 monitored sessions   Modalities: Pull Downs, Lateral Raise, Arm Extension, Arm Curl, Upright Rows, Front Raises, Shoulder Shrugs and leg ext    Home Exercise: Type: walking, Frequency: 3-4 days/week    Goals: 10% improvement in functional capacity - based on max METs achieved in fitness assessment, improved DASI score by 10%, Increase in exercise capacity by 40% - based on peak METs tolerated in cardiac rehab exercise session, Exercise 5 days/wk, >150mins/wk of moderate intensity exercise, Resume ADLs with increased strength, Attend Rehab regularly and start a home exercise program    Progression Toward Goals:  Pt is progressing and showing improvement  toward the following goals:  attending cr sessions regularly 3x/week, increasing strength and endurance with exercise, increasing functional capacity shown by increased MET levels, walking at home for home exercise plan, feeling confident with exercise and pushing himself to increase heart rate with exercise  Education: benefit of exercise for CAD risk factors, home exercise guidelines, AHA guidelines to achieve >150 mins/wk of moderate exercise and RPE scale   Plan:education on home exercise guidelines, home exercise 30+ mins 2 days opposite CR and Education class: Risk Factors for Heart Disease  Readiness to change: Action:  (Changing behavior)      NUTRITION ASSESSMENT AND PLAN    Weight control:    Starting weight: 177 lb   Current weight:   180 lb    Diabetes: N/A  A1c: 5 2    last measured: 10/7/2022    Lipid management: Discussed diet and lipid management and Last lipid profile 10/7/2022  Chol 245  TRG 77        Goals:LDL <100, HDL >40, TRG <150 and CHOL <200    Progression Toward Goals: Pt is progressing and showing improvement  toward the following goals:  doing well with following heart healthy diet  Decreasing fat and salt intake           Education: heart healthy eating  low sodium diet  hydration  Plan: Education class: Reading Food Labels and Education Class: Heart Healthy Eating  Readiness to change: Action:  (Changing behavior)      PSYCHOSOCIAL ASSESSMENT AND PLAN    Emotional:  Depression assessment:  PHQ-9 = 0  0 =No Depression            Anxiety measure:  GOLD-7 = 0  0-4  = Not anxious  Self-reported stress level:  4  Social support: Excellent    Goals:  Physical Fitness in [facility] Score < 3    Progression Toward Goals: Pt is progressing and showing improvement  toward the following goals:  no concerns with depression or anxiety at this time  Has great support from wife and family          Education: signs/sxs of depression and benefits of a positive support system  Plan: Class: Stress and Your Health and Class: Relaxation  Readiness to change: Maintenance: (Maintaining the behavior change)      OTHER CORE COMPONENTS     Tobacco:   Social History     Tobacco Use   Smoking Status Never   Smokeless Tobacco Never       Tobacco Use Intervention:   N/A:  Patient is a non-smoker     Anginal Symptoms:  None   NTG use: No prescription    Blood pressure:    Restin//86   Exercise: 98//86    Goals: consistent BP < 130/80, reduced dietary sodium <2300mg, moderate intensity exercise >150 mins/wk and medication compliance    Progression Toward Goals: Pt is progressing and showing improvement  toward the following goals:  BP is stable within normal resting limits, has normal hemodynamic response to exercise, watching salt intake, taking medications as prescribed          Education:  understanding high blood pressure and it's relationship to CAD and low sodium diet and HTN  Plan: Class: Understanding Heart Disease and Class: Common Heart Medications  Readiness to change: Maintenance: (Maintaining the behavior change)

## 2023-01-04 ENCOUNTER — OFFICE VISIT (OUTPATIENT)
Dept: CARDIOLOGY CLINIC | Facility: CLINIC | Age: 63
End: 2023-01-04

## 2023-01-04 VITALS
DIASTOLIC BLOOD PRESSURE: 64 MMHG | HEIGHT: 73 IN | BODY MASS INDEX: 24.15 KG/M2 | HEART RATE: 87 BPM | SYSTOLIC BLOOD PRESSURE: 124 MMHG | WEIGHT: 182.2 LBS

## 2023-01-04 DIAGNOSIS — I48.3 TYPICAL ATRIAL FLUTTER (HCC): Primary | ICD-10-CM

## 2023-01-04 DIAGNOSIS — Z98.890 S/P MVR (MITRAL VALVE REPAIR): ICD-10-CM

## 2023-01-04 RX ORDER — AMOXICILLIN 500 MG/1
2000 TABLET, FILM COATED ORAL ONCE AS NEEDED
Qty: 4 TABLET | Refills: 5 | Status: SHIPPED | OUTPATIENT
Start: 2023-01-04 | End: 2024-01-04

## 2023-01-04 NOTE — PROGRESS NOTES
Cardiology Follow Up    Brent Zamora  1960  [id number]  [address] [place]  [phone]    1  Typical atrial flutter (HCC)  POCT ECG    AMB extended holter monitor      2  S/P MVR (mitral valve repair)            Interval History: Followup for mitral valve repair  Doing well  No recurrent atrial flutter  Doing well with cardiac rehab       Medical Problems     Problem List     High anion gap metabolic acidosis    Elevated troponin    Elevated d-dimer    CON (acute kidney injury) ([facility] [address] )    Severe mitral regurgitation    Acute mitral valve rupture (HCC)    Cardiogenic shock (HCC)    Transaminitis    History of repair of congenital atrial septal defect (ASD)    Acute blood loss as cause of postoperative anemia    Atrial flutter with rapid ventricular response (HCC)    Chronic heart failure with preserved ejection fraction (HFpEF) ([facility] )    Wt Readings from Last 3 Encounters:   01/04/23 82 6 kg (182 lb 3 2 oz)   11/21/22 81 6 kg (179 lb 12 8 oz)   11/11/22 81 7 kg (180 lb 3 2 oz)                 S/P MVR (mitral valve repair)    HLD (hyperlipidemia)        Past Medical History:   Diagnosis Date   • ASD (atrial septal defect)     s/p repair   • Hyperlipidemia    • Severe mitral regurgitation     s/p repair     Social History     Socioeconomic History   • Marital status: /Civil Union     Spouse name: Not on file   • Number of children: Not on file   • Years of education: Not on file   • Highest education level: Not on file   Occupational History   • Not on file   Tobacco Use   • Smoking status: Never   • Smokeless tobacco: Never   Vaping Use   • Vaping Use: Never used   Substance and Sexual Activity   • Alcohol use: Not Currently   • Drug use: Not Currently   • Sexual activity: Not on file   Other Topics Concern   • Not on file   Social History Narrative   • Not on file     Social Determinants of Health     Financial Resource Strain: Not on file   Food Insecurity: No Food Insecurity   • Worried About Running Out of Food in the Last Year: Never true   • Ran Out of Food in the Last Year: Never true   Transportation Needs: No Transportation Needs   • Lack of Transportation (Medical): No   • Lack of Transportation (Non-Medical): No   Physical Activity: Not on file   Stress: Not on file   Social Connections: Not on file   Intimate Partner Violence: Not on file   Housing Stability: Low Risk    • Unable to Pay for Housing in the Last Year: No   • Number of Places Lived in the Last Year: 1   • Unstable Housing in the Last Year: No      No family history on file  Past Surgical History:   Procedure Laterality Date   • ATRIAL SEPTECTOMY  10/7/2022    Procedure: REPAIR ATRIAL SEPTAL DEFECT (ASD); Surgeon: Antonia Edward MD;  Location: [facility];  Service: Cardiac Surgery   • CARDIAC CATHETERIZATION N/A 10/7/2022    Procedure: CARDIAC CATHETERIZATION;  Surgeon: Salomon Vogel DO;  Location: [facility]; Service: Cardiology   • CARDIAC CATHETERIZATION N/A 10/7/2022    Procedure: Cardiac Coronary Angiogram;  Surgeon: Salomon Vogel DO;  [facility]: [facility]; Service: Cardiology   • CARDIAC CATHETERIZATION N/A 10/7/2022    Procedure: Cardiac iabp; Surgeon: Salomon Vogel DO;  Location[facility];   Service: Cardiology   • INGUINAL HERNIA REPAIR Left     unsure mesh placement   • LA REPLACEMENT OF MITRAL VALVE N/A 10/7/2022    Procedure: MITRAL VALVE REPAIR WITH 34MM PHYSIO II ANNULOPLASTY RING;  Surgeon: Antonia Edward MD;  Location: [facility];  Service: Cardiac Surgery       Current Outpatient Medications:   •  apixaban (Eliquis) 5 mg, Take 1 tablet (5 mg total) by mouth 2 (two) times a day, Disp: 60 tablet, Rfl: 11  •  Ascorbic Acid (vitamin C) 1000 MG tablet, every 24 hours, Disp: , Rfl:   •  atorvastatin (LIPITOR) 40 mg tablet, Take 1 tablet (40 mg total) by mouth daily with dinner, Disp: 30 tablet, Rfl: 3  •  Cyanocobalamin (Vitamin B-12) 500 MCG LOZG, every 24 hours, Disp: , Rfl:   •  Lysine 1000 MG TABS, , Disp: , Rfl:   •  metoprolol succinate (TOPROL-XL) 25 mg 24 hr tablet, Take 1 tablet (25 mg total) by mouth daily Do not start before October 29, 2022 , Disp: 30 tablet, Rfl: 0  •  metoprolol succinate (TOPROL-XL) 25 mg 24 hr tablet, Take 1 tablet (25 mg total) by mouth daily, Disp: 30 tablet, Rfl: 1  •  Multiple Vitamin (MULTI-VITAMIN DAILY PO), , Disp: , Rfl:   •  Omega-3 Fatty Acids (Fish Oil) 500 MG CAPS, Every 12 hours 350-500mg daily, Disp: , Rfl:   •  VITAMIN D PO, Take 5,000 Units by mouth, Disp: , Rfl:   No Known Allergies    Labs:     Chemistry        Component Value Date/Time    K 4 5 10/28/2022 0501     10/28/2022 0501    CO2 28 10/28/2022 0501    CO2 23 10/07/2022 2104    BUN 17 10/28/2022 0501    CREATININE 1 23 10/28/2022 0501        Component Value Date/Time    CALCIUM 8 9 10/28/2022 0501    ALKPHOS 132 (H) 10/28/2022 0501    AST 12 10/28/2022 0501    ALT 32 10/28/2022 0501            No results found for: CHOL  Lab Results   Component Value Date     10/07/2022     Lab Results   Component Value Date    LDLCALC 120 (H) 10/07/2022     Lab Results   Component Value Date    TRIG 77 10/07/2022     No results found for: CHOLHDL    Imaging: No results found  EKG: NSR Normal ECG     Review of Systems   Constitutional: Negative  HENT: Negative  Eyes: Negative  Cardiovascular: Negative  Respiratory: Negative  Endocrine: Negative  Hematologic/Lymphatic: Negative  Skin: Negative  Musculoskeletal: Negative  Gastrointestinal: Negative  Genitourinary: Negative  Neurological: Negative  Psychiatric/Behavioral: Negative  Allergic/Immunologic: Negative  Vitals:    01/04/23 0954   BP: 124/64   Pulse: 87           Physical Exam  Vitals reviewed     Constitutional:       Appearance: Normal appearance  HENT:      Head: Normocephalic  Nose: Nose normal       Mouth/Throat:      Mouth: Mucous membranes are moist       Pharynx: Oropharynx is clear  Eyes:      General: No scleral icterus  Conjunctiva/sclera: Conjunctivae normal    Cardiovascular:      Rate and Rhythm: Normal rate and regular rhythm  Heart sounds: No murmur heard  No friction rub  No gallop  Pulmonary:      Effort: Pulmonary effort is normal  No respiratory distress  Breath sounds: Normal breath sounds  No wheezing or rales  Abdominal:      General: Abdomen is flat  Bowel sounds are normal  There is no distension  Palpations: Abdomen is soft  Tenderness: There is no abdominal tenderness  There is no guarding  Musculoskeletal:      Cervical back: Normal range of motion and neck supple  Right lower leg: No edema  Left lower leg: No edema  Skin:     General: Skin is warm and dry  Neurological:      General: No focal deficit present  Mental Status: He is alert and oriented to person, place, and time  Psychiatric:         Mood and Affect: Mood normal          Behavior: Behavior normal          Discussion/Summary:    Severe Mitral Regurg S/P Repair  Mild MR on his FLIP while in uncontrolled atrial flutter  Okay to DC amiodarone and check zio patch for recurrence for atrial flutter  Continue Eliquis for now  The patient was counseled regarding diagnostic results, instructions for management, risk factor reductions, impressions  total time of encounter was 25 minutes and 15 minutes was spent counseling

## 2023-01-05 ENCOUNTER — CLINICAL SUPPORT (OUTPATIENT)
Dept: CARDIAC REHAB | Facility: HOSPITAL | Age: 63
End: 2023-01-05

## 2023-01-05 ENCOUNTER — APPOINTMENT (OUTPATIENT)
Dept: CARDIAC REHAB | Facility: HOSPITAL | Age: 63
End: 2023-01-05

## 2023-01-05 DIAGNOSIS — Z98.890 S/P MVR (MITRAL VALVE REPAIR): Primary | ICD-10-CM

## 2023-01-06 ENCOUNTER — CLINICAL SUPPORT (OUTPATIENT)
Dept: CARDIAC REHAB | Facility: HOSPITAL | Age: 63
End: 2023-01-06

## 2023-01-06 ENCOUNTER — APPOINTMENT (OUTPATIENT)
Dept: CARDIAC REHAB | Facility: HOSPITAL | Age: 63
End: 2023-01-06

## 2023-01-06 DIAGNOSIS — Z98.890 S/P MVR (MITRAL VALVE REPAIR): Primary | ICD-10-CM

## 2023-01-10 ENCOUNTER — CLINICAL SUPPORT (OUTPATIENT)
Dept: CARDIAC REHAB | Facility: HOSPITAL | Age: 63
End: 2023-01-10

## 2023-01-10 ENCOUNTER — APPOINTMENT (OUTPATIENT)
Dept: CARDIAC REHAB | Facility: HOSPITAL | Age: 63
End: 2023-01-10

## 2023-01-10 DIAGNOSIS — Z98.890 S/P MVR (MITRAL VALVE REPAIR): Primary | ICD-10-CM

## 2023-01-12 ENCOUNTER — APPOINTMENT (OUTPATIENT)
Dept: CARDIAC REHAB | Facility: HOSPITAL | Age: 63
End: 2023-01-12

## 2023-01-12 ENCOUNTER — CLINICAL SUPPORT (OUTPATIENT)
Dept: CARDIAC REHAB | Facility: HOSPITAL | Age: 63
End: 2023-01-12

## 2023-01-12 DIAGNOSIS — Z98.890 S/P MVR (MITRAL VALVE REPAIR): Primary | ICD-10-CM

## 2023-01-13 ENCOUNTER — CLINICAL SUPPORT (OUTPATIENT)
Dept: CARDIAC REHAB | Facility: HOSPITAL | Age: 63
End: 2023-01-13

## 2023-01-13 ENCOUNTER — APPOINTMENT (OUTPATIENT)
Dept: CARDIAC REHAB | Facility: HOSPITAL | Age: 63
End: 2023-01-13

## 2023-01-13 DIAGNOSIS — Z98.890 S/P MVR (MITRAL VALVE REPAIR): Primary | ICD-10-CM

## 2023-01-17 ENCOUNTER — CLINICAL SUPPORT (OUTPATIENT)
Dept: CARDIAC REHAB | Facility: HOSPITAL | Age: 63
End: 2023-01-17

## 2023-01-17 ENCOUNTER — APPOINTMENT (OUTPATIENT)
Dept: CARDIAC REHAB | Facility: HOSPITAL | Age: 63
End: 2023-01-17

## 2023-01-17 DIAGNOSIS — Z98.890 S/P MVR (MITRAL VALVE REPAIR): Primary | ICD-10-CM

## 2023-01-18 ENCOUNTER — APPOINTMENT (OUTPATIENT)
Dept: CARDIAC REHAB | Facility: HOSPITAL | Age: 63
End: 2023-01-18

## 2023-01-19 ENCOUNTER — APPOINTMENT (OUTPATIENT)
Dept: CARDIAC REHAB | Facility: HOSPITAL | Age: 63
End: 2023-01-19

## 2023-01-20 ENCOUNTER — CLINICAL SUPPORT (OUTPATIENT)
Dept: CARDIAC REHAB | Facility: HOSPITAL | Age: 63
End: 2023-01-20

## 2023-01-20 ENCOUNTER — TELEPHONE (OUTPATIENT)
Dept: SLEEP CENTER | Facility: CLINIC | Age: 63
End: 2023-01-20

## 2023-01-20 ENCOUNTER — APPOINTMENT (OUTPATIENT)
Dept: CARDIAC REHAB | Facility: HOSPITAL | Age: 63
End: 2023-01-20

## 2023-01-20 DIAGNOSIS — Z98.890 S/P MVR (MITRAL VALVE REPAIR): Primary | ICD-10-CM

## 2023-01-20 NOTE — TELEPHONE ENCOUNTER
Left message for the patient to call back for sleep study results   Severe KELLY STEWART 26 8   Patient needs to be scheduled for sleep consult

## 2023-01-23 DIAGNOSIS — I48.92 ATRIAL FLUTTER, UNSPECIFIED TYPE (HCC): ICD-10-CM

## 2023-01-23 DIAGNOSIS — I48.92 ATRIAL FLUTTER (HCC): ICD-10-CM

## 2023-01-23 DIAGNOSIS — I34.0 ACUTE MITRAL REGURGITATION FROM CHORDAL RUPTURE (HCC): ICD-10-CM

## 2023-01-23 DIAGNOSIS — I48.92 ATRIAL FLUTTER WITH RAPID VENTRICULAR RESPONSE (HCC): ICD-10-CM

## 2023-01-23 DIAGNOSIS — I51.1 ACUTE MITRAL REGURGITATION FROM CHORDAL RUPTURE (HCC): ICD-10-CM

## 2023-01-23 DIAGNOSIS — I34.0 SEVERE MITRAL REGURGITATION: ICD-10-CM

## 2023-01-23 RX ORDER — METOPROLOL SUCCINATE 25 MG/1
25 TABLET, EXTENDED RELEASE ORAL DAILY
Qty: 30 TABLET | Refills: 5 | Status: SHIPPED | OUTPATIENT
Start: 2023-01-23

## 2023-01-23 NOTE — TELEPHONE ENCOUNTER
Returned the patient's call left message that his sleep study shows KELLY and consult with the sleep specialist is recommended

## 2023-01-24 ENCOUNTER — APPOINTMENT (OUTPATIENT)
Dept: CARDIAC REHAB | Facility: HOSPITAL | Age: 63
End: 2023-01-24

## 2023-01-24 ENCOUNTER — CLINICAL SUPPORT (OUTPATIENT)
Dept: CARDIAC REHAB | Facility: HOSPITAL | Age: 63
End: 2023-01-24

## 2023-01-24 DIAGNOSIS — Z98.890 S/P MVR (MITRAL VALVE REPAIR): Primary | ICD-10-CM

## 2023-01-26 ENCOUNTER — CLINICAL SUPPORT (OUTPATIENT)
Dept: CARDIAC REHAB | Facility: HOSPITAL | Age: 63
End: 2023-01-26

## 2023-01-26 ENCOUNTER — APPOINTMENT (OUTPATIENT)
Dept: CARDIAC REHAB | Facility: HOSPITAL | Age: 63
End: 2023-01-26

## 2023-01-26 DIAGNOSIS — Z98.890 S/P MVR (MITRAL VALVE REPAIR): Primary | ICD-10-CM

## 2023-01-26 NOTE — TELEPHONE ENCOUNTER
Dear Edis,    Here is a summary of your recent test results:  Your stress test showed: mid inferior and inferoseptal defect, small to  moderate in size, mild intensity. Suspect this is attenuation, but  cannot exclude mild ischemia. No infarct.    For additional lab test information, labtestsonline.org is an excellent reference.    In addition, here is a list of due or overdue Health Maintenance reminders:  Medicare Annual Wellness Visit due on 05/24/1966  Hepatitis C Screening   Cholesterol Lab - yearly due on 02/17/2017  Prostate Test (PSA) - yearly due on 02/17/2017    Please call us at [phone] (or use [employer]) to address the above recommendations if needed.           Thank you very much for trusting me and [facility].     Healthy regards,  Chalino Brasher MD   Returned call from patient and advised of sleep study results  Study read by Dr Ronal Gross  Offered to provide number for Dr Cecilia Umanzor office to schedule consult but patient declined  He does not want to go to [place]  Prefers consult in [facility] which is closer to his home  Scheduled consult 5/2/23 with Dr Mavis Fountain in [facility]

## 2023-01-27 ENCOUNTER — APPOINTMENT (OUTPATIENT)
Dept: CARDIAC REHAB | Facility: HOSPITAL | Age: 63
End: 2023-01-27

## 2023-01-27 ENCOUNTER — CLINICAL SUPPORT (OUTPATIENT)
Dept: CARDIAC REHAB | Facility: HOSPITAL | Age: 63
End: 2023-01-27

## 2023-01-27 DIAGNOSIS — Z98.890 S/P MVR (MITRAL VALVE REPAIR): Primary | ICD-10-CM

## 2023-01-31 ENCOUNTER — CLINICAL SUPPORT (OUTPATIENT)
Dept: CARDIAC REHAB | Facility: HOSPITAL | Age: 63
End: 2023-01-31

## 2023-01-31 ENCOUNTER — APPOINTMENT (OUTPATIENT)
Dept: CARDIAC REHAB | Facility: HOSPITAL | Age: 63
End: 2023-01-31

## 2023-01-31 DIAGNOSIS — Z98.890 S/P MVR (MITRAL VALVE REPAIR): Primary | ICD-10-CM

## 2023-01-31 NOTE — PROGRESS NOTES
Cardiac Rehabilitation Plan of Care   90 Day Reassessment  Discharge       Today's date: 2023   # of Exercise Sessions Completed: 30  Patient name: Jarek Emmanuel      : 1960  Age: 60 y o  MRN: [id number]  Referring Physician: Pop Kelsey,*  Cardiologist: Dr Vijaya Álvarez  Provider: [facility]  Clinician: Gisselle Harvey MS, CEP       Dx:   Encounter Diagnosis   Name Primary? • S/P MVR (mitral valve repair) Yes     Date of onset: 10/7/2022      SUMMARY OF PROGRESS:  Mr Nikolas Paredes has been discharged from the cardiac rehabilitation program after recent MVR on 10/7/2022  He has completed 30 sessions of his cardiac rehab program over the past 90 days, attending regularly 3x/week  He chose to end 6 sessions early due to his co-pay  He has made a lot of progress over the 90 day period and feels confidence he is able to be independent  Overall he continues to report he has been doing well with recovery and is feeling great  He is noticing increased strength and endurance since starting rehab and says he is very happy to be participating in cardiac rehab program for physical and mental health  He feels with monitoring he is feeling more confident with pushing himself during exercise sessions  He continues to walk at home along with exercise at rehab sessions 20-30 min daily  He plans for structured plan to continue at [employer] as well as looking into getting home equipment if he can find the right machine/right price  He is currently completing 60 min of aerobic exercise and has increased to 7 2-11 3 METs on different modalities such as TRM, UBE, rower, recumbent bike, and LifeCycle  He has started jogging intervals at 8 6 METs and is working towards increasing time intervals and speed  He tolerated exercise well and  progressed exercise times and intensities appropriately with increasing functional METs  He has started a strength training program 2x/week to increase upper body strength   He completed his post submax ETT completing the full test at 18 mins, RPE: 4, and reaching 9 6 METs  He reported no symptoms during the test and his heart rate had a better response at the higher workloads  He has normal hemodynamic response to exercise  Resting BP 98//72 and with hemodynamic response to exercise 122/2-148/84  Telemetry continues to report NSR with Rare PVCs noted  He reports 100% medication compliance  His goals for his rehab program are to be able to walk on incline on TM with less effort as endurance is built with exercise, and improve overall strength and be able to return to working in yard without limitations  He has met his personal goals at discharge! He notices significant increase in strength and is returning to his normal activities without difficulty  See attached outcomes report for his improvement  He is doing well with his diet and is making healthy choices  He increased his overall RYP score and did increase weight but no concerns for weight at this time  He has gained some muscle as well  He denies depression (PHQ-9=1) and anxiety (GOLD-7=0) at this time  He reports he has excellent emotional support from his wife and family  He attended all education classes on cardiac risk factors  Reviewed discharge instructions and verbalized understanding  Medication compliance: Yes   Comments: Pt reports to be compliant with medications  Fall Risk: Low   Comments: Ambulates with a steady gait with no assist device    EKG Interpretation: NSR w/ rare PVCs   Currently wearing heart monitor       EXERCISE ASSESSMENT and PLAN    Exercise Prescription:      Frequency: 3 days/week   Supplement with home exercise 2+ days/wk as tolerated       Minutes: 60        METS: 8 6-11 3          HR: 30 beats above resting   RPE: 4-6         Modalities: Treadmill, UBE, Lifecycle, Elliptical, Recumbent bike and Jogging intervals      Exercise Progression after Discharge: [employer]/Home Equipment/Walking    Frequency: 3-5 days of gym or home exercise   Minutes: 30-50  >150 mins/wk of moderate intensity exercise   METS: 4 5-11 3   HR: 109-146   RPE: 4-6   Modalities: Treadmill, UBE, Lifecycle, Elliptical, Rower, Recumbent bike and Jogging intervals and strength training       Strength trainin-3 days / week  12-15 repetitions  1-2 sets per modality    Modalities: Pull Downs, Lateral Raise, Arm Extension, Arm Curl, Upright Rows, Front Raises, Shoulder Shrugs and leg ext    Home Exercise: Type: [employer]/Walking , Frequency: 3-5 days/week, Duration: 30-60 mins    Goals: 10% improvement in functional capacity - based on max METs achieved in fitness assessment, improved DASI score by 10%, Increase in exercise capacity by 40% - based on peak METs tolerated in cardiac rehab exercise session, Exercise 5 days/wk, >150mins/wk of moderate intensity exercise, Resume ADLs with increased strength, Attend Rehab regularly and start a home exercise program    Progression Toward Goals:  Goals met: attended rehab regularly 3x/week, increased overall exercise tolerance/fucntional METs, increased submax ETT by 28%, increased overall strength, endurance, and confidence, acheiving >150 mins/week of moderate intensity exercise, strength training program 2x/week, resumed all ADLs, DASI score improved, and has structured home exercise plan for discharge attending [employer]/home equipment  Outcomes Report attached with improvements   , Patient will be encouraged to focus on lifestyle modifications following discharge      Education: benefit of exercise for CAD risk factors, home exercise guidelines, AHA guidelines to achieve >150 mins/wk of moderate exercise and RPE scale   Plan:education on home exercise guidelines, home exercise 30+ mins 2 days opposite CR and Education class: Risk Factors for Heart Disease  Readiness to change: Action:  (Changing behavior)      NUTRITION ASSESSMENT AND PLAN    Weight control: Starting weight: 177 lb   Current weight:   182 4 lb    Diabetes: N/A  A1c: 5 2    last measured: 10/7/2022    Lipid management: Discussed diet and lipid management, Last lipid profile 10/7/2022  Chol 245  TRG 77     and Pt has a script for repeat lipid profile  Goals:LDL <100, HDL >40, TRG <150 and CHOL <200    Progression Toward Goals: Goals met: making heart healthy choices, RYP score increased, watching sodium intake and fat intake, and weight gain with program but no concerns about weight - gained muscle    , Patient will be encouraged to focus on lifestyle modifications following discharge , Has script for repeat lipid profile to compare from October 2022    Education: heart healthy eating  low sodium diet  hydration  Plan: Education class: Reading Food Labels and Education Class: Heart Healthy Eating  Readiness to change: Action:  (Changing behavior)      PSYCHOSOCIAL ASSESSMENT AND PLAN    Emotional:  Depression assessment:  PHQ-9 = 1  1-4 = Minimal Depression            Anxiety measure:  GOLD-7 = 0  0-4  = Not anxious  Self-reported stress level:  4  Social support: Excellent    Goals:  Physical Fitness in [place] Score < 3    Progression Toward Goals: Goals met: denies depression and anxiety, great support system, Phys[employer] Fitness [place] score now a 2, overall [place] score improved, and positive attitude and motivated to keep a heart healthy lifestyle  , Patient will be encouraged to focus on lifestyle modifications following discharge      Education: signs/sxs of depression and benefits of a positive support system  Plan: Class: Stress and Your Health and Class: Relaxation  Readiness to change: Maintenance: (Maintaining the behavior change)      OTHER CORE COMPONENTS     Tobacco:   Social History     Tobacco Use   Smoking Status Never   Smokeless Tobacco Never       Tobacco Use Intervention:   N/A:  Patient is a non-smoker     Anginal Symptoms:  None   NTG use: No prescription    Blood pressure:    Restin//72    Exercise: 122/2-148/84    Goals: consistent BP < 130/80, reduced dietary sodium <2300mg, moderate intensity exercise >150 mins/wk and medication compliance    Progression Toward Goals: Goals met: medication compliance, hoping to reduce medications in the near future with exercise/healthy choices, low sodium intake, acheiving >150 mins/week of mod intensity exercise with rehab and continuation at [employer]/home, and BPs within normal limits at rest and normal response to exercise  , Patient will be encouraged to focus on lifestyle modifications following discharge      Education:  understanding high blood pressure and it's relationship to CAD and low sodium diet and HTN  Plan: Class: Understanding Heart Disease and Class: Common Heart Medications  Readiness to change: Maintenance: (Maintaining the behavior change)

## 2023-02-02 ENCOUNTER — APPOINTMENT (OUTPATIENT)
Dept: CARDIAC REHAB | Facility: HOSPITAL | Age: 63
End: 2023-02-02

## 2023-02-03 ENCOUNTER — CLINICAL SUPPORT (OUTPATIENT)
Dept: CARDIOLOGY CLINIC | Facility: CLINIC | Age: 63
End: 2023-02-03

## 2023-02-03 DIAGNOSIS — I48.3 TYPICAL ATRIAL FLUTTER (HCC): ICD-10-CM

## 2023-02-07 DIAGNOSIS — Z98.890 S/P MVR (MITRAL VALVE REPAIR): ICD-10-CM

## 2023-02-07 DIAGNOSIS — I51.1 ACUTE MITRAL REGURGITATION FROM CHORDAL RUPTURE (HCC): ICD-10-CM

## 2023-02-07 DIAGNOSIS — I34.0 ACUTE MITRAL REGURGITATION FROM CHORDAL RUPTURE (HCC): ICD-10-CM

## 2023-02-07 RX ORDER — ATORVASTATIN CALCIUM 40 MG/1
40 TABLET, FILM COATED ORAL
Qty: 90 TABLET | Refills: 3 | Status: SHIPPED | OUTPATIENT
Start: 2023-02-07

## 2023-02-07 NOTE — TELEPHONE ENCOUNTER
Pt stopped in - message relayed as given  He is currently wearing Zio 2 of 2  Pt has 2 weeks of Eliquis left and states he will finish and d/c       Pt requests refill of Atorvastatin to [facility]

## 2023-02-07 NOTE — TELEPHONE ENCOUNTER
----- Message from Jamel Zuniga MD sent at 2/6/2023  8:59 AM EST -----  This looks good  He may discontinue eliquis  Patient had a min HR of 64 bpm, max HR of 156 bpm, and avg HR of 80  bpm  Predominant underlying rhythm was Sinus Rhythm  5  Supraventricular Tachycardia runs occurred, the run with the fastest  interval lasting 4 beats with a max rate of 144 bpm, the longest lasting 14  beats with an avg rate of 104 bpm  Isolated SVEs were rare (<1 0%), SVE  Couplets were rare (<1 0%), and SVE Triplets were rare (<1 0%)  Isolated  VEs were rare (<1 0%), VE Couplets were rare (<1 0%), and no VE Triplets  were present   Ventricular Bigeminy and Trigeminy were present   ----- Message -----  From: Neal Ruano MA  Sent: 2/3/2023   2:17 PM EST  To: Jamel Zuniga MD

## 2023-02-20 ENCOUNTER — TELEPHONE (OUTPATIENT)
Dept: GASTROENTEROLOGY | Facility: CLINIC | Age: 63
End: 2023-02-20

## 2023-02-23 NOTE — TELEPHONE ENCOUNTER
Pt called back and states he had a colonoscopy outside of [facility] in 2021  They told him he didn't need another one for 10 years, so he does not wish to schedule one at this time

## 2023-02-27 ENCOUNTER — CLINICAL SUPPORT (OUTPATIENT)
Dept: CARDIOLOGY CLINIC | Facility: CLINIC | Age: 63
End: 2023-02-27

## 2023-02-27 DIAGNOSIS — I48.92 ATRIAL FLUTTER WITH RAPID VENTRICULAR RESPONSE (HCC): ICD-10-CM

## 2023-03-21 ENCOUNTER — TELEPHONE (OUTPATIENT)
Dept: CARDIOLOGY CLINIC | Facility: CLINIC | Age: 63
End: 2023-03-21

## 2023-03-21 NOTE — TELEPHONE ENCOUNTER
Pt called back, message relayed as given, pt verbalized understanding  Pt wanted to notify that his BP lowering   Its within the normal limits with a systolic of 846 and pt is not symptomatic This is a 39  year old patient referred by Dr Celia Fisher for epigastric pain, dysphagia, rectal bleeding and constipation. A copy of this note will be sent to the referring provider.  Pt only speaks Icelandic and history obtained with help of . Pt has chronic epigastric pain for 1 year. Pain described as sharp dicomfort in epigastric area which worsens with food. Pain does not radiate. Pt denies N/V or GERD. Pt has dysphagia for solids when food sticks in esophagus during swallowing. Pt never had EGD Pt has chronic constipation, with average 2-3 BMs per week. Stool small and hard. Pt has frequent rectal bleeding with BMs. Pt never had a colonoscopy.  CT A/P 5/3/22: No acute abnormality.

## 2023-03-21 NOTE — TELEPHONE ENCOUNTER
----- Message from Kristen Sebastian MD sent at 3/21/2023  8:11 AM EDT -----  No atrial fib or flutter  Short runs of PSVT or NSVT which are okay   Okay to stop eliquis    ----- Message -----  From: Krista Ba MA  Sent: 2/3/2023   2:17 PM EDT  To: Kristen Sebastian MD

## 2023-03-31 DIAGNOSIS — Z98.890 S/P MVR (MITRAL VALVE REPAIR): ICD-10-CM

## 2023-03-31 RX ORDER — AMOXICILLIN 500 MG/1
2000 TABLET, FILM COATED ORAL ONCE AS NEEDED
Qty: 4 TABLET | Refills: 5 | Status: SHIPPED | OUTPATIENT
Start: 2023-03-31 | End: 2024-03-30

## 2023-05-02 ENCOUNTER — OFFICE VISIT (OUTPATIENT)
Dept: SLEEP CENTER | Facility: HOSPITAL | Age: 63
End: 2023-05-02

## 2023-05-02 VITALS
BODY MASS INDEX: 24.52 KG/M2 | WEIGHT: 185 LBS | HEART RATE: 92 BPM | SYSTOLIC BLOOD PRESSURE: 142 MMHG | HEIGHT: 73 IN | OXYGEN SATURATION: 100 % | DIASTOLIC BLOOD PRESSURE: 90 MMHG

## 2023-05-02 DIAGNOSIS — I48.92 ATRIAL FLUTTER WITH RAPID VENTRICULAR RESPONSE (HCC): ICD-10-CM

## 2023-05-02 DIAGNOSIS — F41.9 ANXIETY: ICD-10-CM

## 2023-05-02 DIAGNOSIS — I50.32 CHRONIC HEART FAILURE WITH PRESERVED EJECTION FRACTION (HFPEF) (HCC): ICD-10-CM

## 2023-05-02 DIAGNOSIS — G47.33 OBSTRUCTIVE SLEEP APNEA (ADULT) (PEDIATRIC): Primary | ICD-10-CM

## 2023-05-02 DIAGNOSIS — G47.09 OTHER INSOMNIA: ICD-10-CM

## 2023-05-02 LAB
ALBUMIN SERPL-MCNC: 4.5 G/DL (ref 3.8–4.8)
ALP SERPL-CCNC: 114 IU/L (ref 44–121)
ALT SERPL-CCNC: 36 IU/L (ref 0–44)
AST SERPL-CCNC: 42 IU/L (ref 0–40)
BILIRUB DIRECT SERPL-MCNC: 0.29 MG/DL (ref 0–0.4)
BILIRUB SERPL-MCNC: 1 MG/DL (ref 0–1.2)
CHOLEST SERPL-MCNC: 149 MG/DL (ref 100–199)
CHOLEST/HDLC SERPL: 1.9 RATIO (ref 0–5)
HDLC SERPL-MCNC: 78 MG/DL
LDLC SERPL CALC-MCNC: 60 MG/DL (ref 0–99)
PROT SERPL-MCNC: 6.6 G/DL (ref 6–8.5)
SL AMB T4, FREE (DIRECT): 1.34 NG/DL (ref 0.82–1.77)
SL AMB VLDL CHOLESTEROL CALC: 11 MG/DL (ref 5–40)
TRIGL SERPL-MCNC: 53 MG/DL (ref 0–149)
TSH SERPL DL<=0.005 MIU/L-ACNC: 6.48 UIU/ML (ref 0.45–4.5)

## 2023-05-02 NOTE — PATIENT INSTRUCTIONS
What is KELLY? Obstructive sleep apnea is a common and serious sleep disorder that causes you to stop breathing during sleep  The airway repeatedly becomes blocked, limiting the amount of air that reaches your lungs  When this happens, you may snore loudly or making choking noises as you try to breathe  Your brain and body becomes oxygen deprived and you may wake up  This may happen a few times a night, or in more severe cases, several hundred times a night  Sleep apnea can make you wake up in the morning feeling tired or unrefreshed even though you have had a full night of sleep  During the day, you may feel fatigued, have difficulty concentrating or you may even unintentionally fall asleep  This is because your body is waking up numerous times throughout the night, even though you might not be conscious of each awakening  The lack of oxygen your body receives can have negative long-term consequences for your health  This includes:  High blood pressure  Heart disease  Irregular heart rhythms  Stroke  Pre-diabetes and diabetes  Depression  Testing  An objective evaluation of your sleep may be needed before your board certified sleep physician can make a diagnosis  Options include:   In-lab overnight sleep study  This type of sleep study requires you to stay overnight at a sleep center, in a bed that may resemble a hotel room  You will sleep with sensors hooked up to various parts of your body  These sensors record your brain waves, heartbeat, breathing and movement  An overnight sleep study also provides your doctor with the most complete information about your sleep  Learn more about an overnight sleep study  Home sleep apnea test  Some patients with high risk factors for obstructive sleep apnea and no other medical disorders may be candidates for a home sleep apnea test  The testing equipment differs in that it is less complicated than what is used in an overnight sleep study   As such, does not give all the data an in-lab will and if negative, may not mean you do not have the problem  Treatment for sleep apnea includes using a continuous positive airway pressure (CPAP) machine to keep your airway open during sleep  A mask is placed over your nose and mouth, or just your nose  The mask is hooked to the CPAP machine that blows a gentle stream of air into the mask when you breathe  This helps keep your airway open so you can breathe more regularly  Extra oxygen may be given to you through the machine  You may be given a mouth device  It looks like a mouth guard or dental retainer and stops your tongue and mouth tissues from blocking your throat while you sleep  Surgery may be needed to remove extra tissues that block your mouth, throat, or nose  Manage sleep apnea:   Do not smoke  Nicotine and other chemicals in cigarettes and cigars can cause lung damage  Ask your healthcare provider for information if you currently smoke and need help to quit  E-cigarettes or smokeless tobacco still contain nicotine  Talk to your healthcare provider before you use these products  Do not drink alcohol or take sedative medicine before you go to sleep  Alcohol and sedatives can relax the muscles and tissues around your throat  This can block the airflow to your lungs  Maintain a healthy weight  Excess tissue around your throat may restrict your breathing  Ask your healthcare provider for information if you need to lose weight  Sleep on your side or use pillows designed to prevent sleep apnea  This prevents your tongue or other tissues from blocking your throat  You can also raise the head of your bed  Driving Safety  Refrain from driving when drowsy  Follow up with your healthcare provider as directed: Write down your questions so you remember to ask them during your visits  Go to AASM website for more information: Sleepeducation  org  What is KELLY?    Obstructive sleep apnea is a common and serious sleep disorder that causes you to stop breathing during sleep  The airway repeatedly becomes blocked, limiting the amount of air that reaches your lungs  When this happens, you may snore loudly or making choking noises as you try to breathe  Your brain and body becomes oxygen deprived and you may wake up  This may happen a few times a night, or in more severe cases, several hundred times a night  Sleep apnea can make you wake up in the morning feeling tired or unrefreshed even though you have had a full night of sleep  During the day, you may feel fatigued, have difficulty concentrating or you may even unintentionally fall asleep  This is because your body is waking up numerous times throughout the night, even though you might not be conscious of each awakening  The lack of oxygen your body receives can have negative long-term consequences for your health  This includes:  High blood pressure  Heart disease  Irregular heart rhythms  Stroke  Pre-diabetes and diabetes  Depression  Testing  An objective evaluation of your sleep may be needed before your board certified sleep physician can make a diagnosis  Options include:   In-lab overnight sleep study  This type of sleep study requires you to stay overnight at a sleep center, in a bed that may resemble a hotel room  You will sleep with sensors hooked up to various parts of your body  These sensors record your brain waves, heartbeat, breathing and movement  An overnight sleep study also provides your doctor with the most complete information about your sleep  Learn more about an overnight sleep study  Home sleep apnea test  Some patients with high risk factors for obstructive sleep apnea and no other medical disorders may be candidates for a home sleep apnea test  The testing equipment differs in that it is less complicated than what is used in an overnight sleep study  As such, does not give all the data an in-lab will and if negative, may not mean you do not have the problem    Treatment for sleep apnea includes using a continuous positive airway pressure (CPAP) machine to keep your airway open during sleep  A mask is placed over your nose and mouth, or just your nose  The mask is hooked to the CPAP machine that blows a gentle stream of air into the mask when you breathe  This helps keep your airway open so you can breathe more regularly  Extra oxygen may be given to you through the machine  You may be given a mouth device  It looks like a mouth guard or dental retainer and stops your tongue and mouth tissues from blocking your throat while you sleep  Surgery may be needed to remove extra tissues that block your mouth, throat, or nose  Manage sleep apnea:   Do not smoke  Nicotine and other chemicals in cigarettes and cigars can cause lung damage  Ask your healthcare provider for information if you currently smoke and need help to quit  E-cigarettes or smokeless tobacco still contain nicotine  Talk to your healthcare provider before you use these products  Do not drink alcohol or take sedative medicine before you go to sleep  Alcohol and sedatives can relax the muscles and tissues around your throat  This can block the airflow to your lungs  Maintain a healthy weight  Excess tissue around your throat may restrict your breathing  Ask your healthcare provider for information if you need to lose weight  Sleep on your side or use pillows designed to prevent sleep apnea  This prevents your tongue or other tissues from blocking your throat  You can also raise the head of your bed  Driving Safety  Refrain from driving when drowsy  Follow up with your healthcare provider as directed: Write down your questions so you remember to ask them during your visits  Go to AASM website for more information: Sleepeducation  org    What you can do to improve your sleep: (Sleep Hygiene) Basic rules for a good night's sleep  Create a regular sleep schedule  This will help you form a sleep routine   Keep a record of your sleep patterns, and any sleeping problems you have  Bring the record to follow-up visits with healthcare providers  Avoid prolonged use of light-emitting screens before bedtime or watching TV in bed  Avoid forcing sleep  Do not take naps  Naps could make it hard for you to fall asleep at bedtime  Deal with your worries before bedtime  Keep your bedroom cool, quiet, and dark  Turn on white noise, such as a fan, to help you relax  Do not use your bed for any activity that will keep you awake  Do not read, exercise, eat, or watch TV in your bedroom  Get up if you do not fall asleep within 20 minutes  Move to another room and do something relaxing until you become sleepy  Limit caffeine, alcohol, nicotine and food to earlier in the day  Only drink caffeine in the morning  Do not drink alcohol within 6 hours of bedtime  Do not eat a heavy meal right before you go to bed  Avoid smoking, especially in the evening  Exercise regularly  Daily exercise will help you sleep better  Do not exercise within 4 hours of bedtime  Stimulus control therapy rules  1  Go to bed only when sleepy  2  Do not watch television, read, eat, or worry while in bed  Use bed only for sleep and sex  3  Get out of bed if unable to fall asleep within 20 minutes and go to another room  Return to bed only when sleepy  Repeat this step as many times as necessary throughout the night  4  Set an alarm clock to wake up at a fixed time each morning, including weekends  5  Do not take a nap during the day  Data from: [address] [place] [address], [address] Nonpharmacologic treatments of insomnia  J Clin Psychiatry 2296; 53:37  Go to AASM website for more information: Sleepeducation  org  Recommended Reading: Book by authors [address]   No More sleepless nights  Nursing Support:  When: Monday through Friday 7A-5PM except holidays  Where: Our direct line is [phone]  If you are having a true emergency please call 911    In the event that the line is busy or it is after hours please leave a voice message and we will return your call  Please speak clearly, leaving your full name, birth date, best number to reach you and the reason for your call  Medication refills: We will need the name of the medication, the dosage, the ordering provider, whether you get a 30 or 90 day refill, and the pharmacy name and address  Medications will be ordered by the provider only  Nurses cannot call in prescriptions  Please allow 7 days for medication refills  Physician requested updates: If your provider requested that you call with an update after starting medication, please be ready to provide us the medication and dosage, what time you take your medication, the time you attempt to fall asleep, time you fall asleep, when you wake up, and what time you get out of bed  Sleep Study Results: We will contact you with sleep study results and/or next steps after the physician has reviewed your testing

## 2023-05-02 NOTE — PROGRESS NOTES
Consultation - [address]  58 y o  male  SZP:[birth year]  T:[id number]  DOS:5/2/2023    Physician Requesting Consult: Humera Larry DO             Reason for Consult : At your kind request I saw Lucio Guzman for initial sleep evaluation today  Home sleep testing was undertaken to evaluate for sleep disordered breathing and patient is here to review results and further options  The study demonstrated: STEWART (respiratory event index of) 26 8 /hour  There was significant number of central events  minimum oxygen saturation 84% and 3% of total sleep time was spent with saturations less than 90%  The snore index was 2 9 9% of the time but%  PFSH, Problem List, Medications & Allergies were reviewed in EMR  Gill Truong  has a past medical history of ASD (atrial septal defect), Hyperlipidemia, and Severe mitral regurgitation  He has a current medication list which includes the following prescription(s): amoxicillin, vitamin c, atorvastatin, vitamin b-12, lysine, metoprolol succinate, multiple vitamin, fish oil, vitamin d, apixaban, and metoprolol succinate  HPI: His study was undertaken at recommendation of cardiologist since he had recent mitral valve replacement  He volunteered no sleep complaints  Wife has reported episodes of apnea that he is not aware of  Other Complaints: None  Restless Leg Syndrome: reports no suggestive symptoms  Parasomnia: no features reported    Sleep Routine (on average): Typical Bedtime: 10:30 p m  gets OOB: 6:45 AM TIB:>8 hrs  Sleep latency:> 60 minutes he attributes to mind racing because of anxiety  He denied psychosocial stressors  He uses over-the-counter medications as a sleep aid as needed  Sleep Interruptions:3-4/nite because of nocturia and struggles to fall back asleep  Awakens: spontaneously  Upon awakening: is not always refreshed   [He estimates getting 5 hrs sleep ] Gill Truong denies excessive daytime sleepiness [dozes off when sedentary "at home] in the evenings  He rated [himself] at Total score: 12 /24 on the [place] Sleepiness Scale  Habits:   reports that he has never smoked  He has never used smokeless tobacco ;  reports that he does not currently use alcohol ;[ reports that he does not currently use drugs  ]; [  E-Cigarette/Vaping    E-Cigarette Use Never User    ]; Caffeine use:limited; Exercise routine: regular  Family History: Negative for sleep disturbance  ROS: Significant for weight has been stable     He has constant nasal congestion and habitually breathes through the mouth  He reported no other respiratory or cardiac symptoms  EXAM:  /90 (BP Location: Right arm, Cuff Size: Standard)   Pulse 92   Ht 6' 1\" (1 854 m)   Wt 83 9 kg (185 lb)   SpO2 100%   BMI 24 41 kg/m²    General: Well groomed male, well appearing, in no apparent distress  Neurological: Alert and orientated; cooperative; Cranial nerves intact;    Psychiatric: Speech: Clear and coherent; normal mood, affect & thought   Skin: Warm and dry; Color& Hydration good; no facial rashes or lesions   HEENT:  Craniofacial anatomy: narrow facies Sinuses: Non-tender  TMJ: Normal   Eyes: EOM's intact; conjunctiva/corneas clear   Ears: Externally appear normal     Nasal Airway: is patent Septum: Intact; Mucosa: Normal; Turbinates: Normal; Rhinorrhea: None  Mouth: Lips: Normal posture; Dentition: irregular  Mucosa: Moist; Hard Palate:narrow and high arched   Oropharryx: appears normal Tongue: Mallampati:Class II and MobileSoft Palate: Uvular Hypertrophy Tonsils: absent  Neck:; [Neck Circumference: 14 \";] Supple; no abnormal masses; Thyroid: Normal  Trachea: Central     Lymph: No cervical or submandibular Lymhadenopathy  Heart: S1,S2 normal; RRR; no gallop; no murmur s  Lungs: Respiratory Effort: Normal  Air entry good bilaterally  No wheezes  No rales  Abdomen: Obese, soft & non-tender    Extremities: No pedal edema  No clubbing or cyanosis      Musculoskeletal: " " Motor normal; Gait: Normal        IMPRESSION: Primary/Secondary Sleep Diagnoses (to Medical or Psych conditions) & Comorbidities   1  Obstructive sleep apnea (adult) (pediatric)  Ambulatory Referral to Sleep Medicine    CPAP Study      2  Other insomnia  CPAP Study      3  Chronic heart failure with preserved ejection fraction (HFpEF) ([facility])  CPAP Study      4  Atrial flutter with rapid ventricular response ([address] )        5  Anxiety             PLAN:  1  I reviewed results of the Sleep study with the patient  2  With respect to above conditions, I counseled on pathophysiology, diagnosis, treatment options, risks and benefits; inter-relationship and effects on symptoms and comorbidities; risks of no treatment; costs and insurance aspects  3  Patient will consider positive airway pressure therapy and wanted to discuss with cardiologist before proceeding with a a titration study, that is necessary because of CHF and central events noted on his diagnostic study  4   Multi component Cognitive behavioral therapy for Insomnia undertaken - Sleep Restriction, Stimulus control, Relaxation techniques and Sleep hygiene were discussed  5   He may warrant medication for anxiety  6   He will call to schedule the study if he decides to proceed  Follow-up to be scheduled after initiating therapy to review results and to adjust therapy  Sincerely,      Authenticated electronically on 39/97/41   Board Certified Specialist     Portions of the record may have been created with voice recognition software  Occasional wrong word or \"sound a like\" substitutions may have occurred due to the inherent limitations of voice recognition software  There may also be notations and random deletions of words or characters from malfunctioning software  Read the chart carefully and recognize, using context, where substitutions/deletions have occurred       "

## 2023-05-05 ENCOUNTER — OFFICE VISIT (OUTPATIENT)
Dept: CARDIOLOGY CLINIC | Facility: CLINIC | Age: 63
End: 2023-05-05

## 2023-05-05 VITALS
HEIGHT: 73 IN | BODY MASS INDEX: 24.41 KG/M2 | WEIGHT: 184.2 LBS | DIASTOLIC BLOOD PRESSURE: 84 MMHG | SYSTOLIC BLOOD PRESSURE: 142 MMHG | HEART RATE: 95 BPM

## 2023-05-05 DIAGNOSIS — Z98.890 S/P MVR (MITRAL VALVE REPAIR): Primary | ICD-10-CM

## 2023-05-05 NOTE — PROGRESS NOTES
Cardiology Follow Up    Shaun Santos  1960  [id number]  [address] [place]  [phone]    1  S/P MVR (mitral valve repair)            Interval History: Followup mitral valve repair  Doing well  No chest pain, dyspnea or palpitations  He does exercise regularly       Medical Problems     Problem List     High anion gap metabolic acidosis    Elevated troponin    Elevated d-dimer    CON (acute kidney injury) ([facility] )    Severe mitral regurgitation    Acute mitral valve rupture (HCC)    Cardiogenic shock (HCC)    Transaminitis    History of repair of congenital atrial septal defect (ASD)    Acute blood loss as cause of postoperative anemia    Atrial flutter with rapid ventricular response (HCC)    Chronic heart failure with preserved ejection fraction (HFpEF) ([facility] [address] )    Wt Readings from Last 3 Encounters:   05/05/23 83 6 kg (184 lb 3 2 oz)   05/02/23 83 9 kg (185 lb)   01/04/23 82 6 kg (182 lb 3 2 oz)                 S/P MVR (mitral valve repair)    HLD (hyperlipidemia)    Obstructive sleep apnea    Mixed sleep apnea        Past Medical History:   Diagnosis Date    ASD (atrial septal defect)     s/p repair    Hyperlipidemia     Severe mitral regurgitation     s/p repair     Social History     Socioeconomic History    Marital status: /Civil Union     Spouse name: Not on file    Number of children: Not on file    Years of education: Not on file    Highest education level: Not on file   Occupational History    Not on file   Tobacco Use    Smoking status: Never    Smokeless tobacco: Never   Vaping Use    Vaping Use: Never used   Substance and Sexual Activity    Alcohol use: Not Currently    Drug use: Not Currently    Sexual activity: Not on file   Other Topics Concern    Not on file   Social History Narrative    Not on file     Social Determinants of Health     Financial Resource Strain: Not on file   Food Insecurity: No Food Insecurity    Worried About Running Out of Food in the Last Year: Never true    Dim[place] of Food in the Last Year: Never true   Transportation Needs: No Transportation Needs    Lack of Transportation (Medical): No    Lack of Transportation (Non-Medical): No   Physical Activity: Not on file   Stress: Not on file   Social Connections: Not on file   Intimate Partner Violence: Not on file   Housing Stability: Low Risk     Unable to Pay for Housing in the Last Year: No    Number of Places Lived in the Last Year: 1    Unstable Housing in the Last Year: No      No family history on file  Past Surgical History:   Procedure Laterality Date    ATRIAL SEPTECTOMY  10/7/2022    Procedure: REPAIR ATRIAL SEPTAL DEFECT (ASD); Surgeon: Sage Penaloza MD;  [facility];  Service: Cardiac Surgery    CARDIAC CATHETERIZATION N/A 10/7/2022    Procedure: CARDIAC CATHETERIZATION;  Surgeon: Teresa Goldman DO;  [facility]; Service: Cardiology    CARDIAC CATHETERIZATION N/A 10/7/2022    Procedure: Cardiac Coronary Angiogram;  Surgeon: Teresa Goldman DO;  [facility]; Service: Cardiology    CARDIAC CATHETERIZATION N/A 10/7/2022    Procedure: Cardiac iabp; Surgeon: Teresa Goldman DO;  [facility];   Service: Cardiology    INGUINAL HERNIA REPAIR Left     unsure mesh placement    MA REPLACEMENT MITRAL VALVE W/CARDIOPULMONARY BYP N/A 10/7/2022    Procedure: MITRAL VALVE REPAIR WITH 34MM PHYSIO II ANNULOPLASTY RING;  Surgeon: Sage Penaloza MD;  [facility];  Service: Cardiac Surgery       Current Outpatient Medications:     amoxicillin (AMOXIL) 500 MG tablet, Take 4 tablets (2,000 mg total) by mouth once as needed (dental work) for up to 1 dose, Disp: 4 tablet, Rfl: 5    Ascorbic Acid (vitamin C) 1000 MG tablet, every 24 hours, Disp: , Rfl:     atorvastatin (LIPITOR) 40 mg tablet, Take 1 tablet (40 mg total) by mouth daily with dinner, Disp: 90 tablet, Rfl: 3    Cyanocobalamin (Vitamin B-12) 500 MCG LOZG, every 24 hours, Disp: , Rfl:     Lysine 1000 MG TABS, , Disp: , Rfl:     metoprolol succinate (TOPROL-XL) 25 mg 24 hr tablet, Take 1 tablet (25 mg total) by mouth daily, Disp: 30 tablet, Rfl: 5    Multiple Vitamin (MULTI-VITAMIN DAILY PO), , Disp: , Rfl:     Omega-3 Fatty Acids (Fish Oil) 500 MG CAPS, daily 350-500mg daily, Disp: , Rfl:     VITAMIN D PO, Take 5,000 Units by mouth, Disp: , Rfl:   No Known Allergies    Labs:     Chemistry        Component Value Date/Time    K 4 5 10/28/2022 0501     10/28/2022 0501    CO2 28 10/28/2022 0501    CO2 23 10/07/2022 2104    BUN 17 10/28/2022 0501    CREATININE 1 23 10/28/2022 0501        Component Value Date/Time    CALCIUM 8 9 10/28/2022 0501    ALKPHOS 132 (H) 10/28/2022 0501    AST 42 (H) 05/01/2023 0722    ALT 36 05/01/2023 0722            No results found for: CHOL  Lab Results   Component Value Date    HDL 78 05/01/2023     10/07/2022     Lab Results   Component Value Date    LDLCALC 60 05/01/2023    LDLCALC 120 (H) 10/07/2022     Lab Results   Component Value Date    TRIG 53 05/01/2023    TRIG 77 10/07/2022     Lab Results   Component Value Date    CHOLHDL 1 9 05/01/2023       Imaging: No results found  Review of Systems   Constitutional: Negative  HENT: Negative  Eyes: Negative  Cardiovascular: Negative  Respiratory: Negative  Endocrine: Negative  Hematologic/Lymphatic: Negative  Skin: Negative  Musculoskeletal: Negative  Gastrointestinal: Negative  Genitourinary: Negative  Neurological: Negative  Psychiatric/Behavioral: Negative  Allergic/Immunologic: Negative  Vitals:    05/05/23 1125   BP: 142/84   Pulse: 95           Physical Exam  Vitals reviewed  Constitutional:       Appearance: Normal appearance  HENT:      Head: Normocephalic        Nose: Nose normal  Mouth/Throat:      Mouth: Mucous membranes are moist       Pharynx: Oropharynx is clear  Eyes:      General: No scleral icterus  Conjunctiva/sclera: Conjunctivae normal    Cardiovascular:      Rate and Rhythm: Normal rate and regular rhythm  Heart sounds: No murmur heard  No friction rub  No gallop  Pulmonary:      Effort: Pulmonary effort is normal  No respiratory distress  Breath sounds: Normal breath sounds  No wheezing or rales  Abdominal:      General: Abdomen is flat  Bowel sounds are normal  There is no distension  Palpations: Abdomen is soft  Tenderness: There is no abdominal tenderness  There is no guarding  Musculoskeletal:      Cervical back: Normal range of motion and neck supple  Right lower leg: No edema  Left lower leg: No edema  Skin:     General: Skin is warm and dry  Neurological:      General: No focal deficit present  Mental Status: He is alert and oriented to person, place, and time  Psychiatric:         Mood and Affect: Mood normal          Behavior: Behavior normal          Discussion/Summary:    Severe Mitral Regurg S/P Repair  Mild MR on his FLIP while in uncontrolled atrial flutter  He is in NSR and no afib on his zio  Off AC  No changes today  The patient was counseled regarding diagnostic results, instructions for management, risk factor reductions, impressions  total time of encounter was 25 minutes and 15 minutes was spent counseling

## 2023-06-28 ENCOUNTER — HOSPITAL ENCOUNTER (OUTPATIENT)
Dept: SLEEP CENTER | Facility: HOSPITAL | Age: 63
Discharge: HOME | End: 2023-06-28
Attending: INTERNAL MEDICINE
Payer: COMMERCIAL

## 2023-06-28 DIAGNOSIS — I50.32 CHRONIC HEART FAILURE WITH PRESERVED EJECTION FRACTION (HFPEF) (HCC): ICD-10-CM

## 2023-06-28 DIAGNOSIS — G47.33 OBSTRUCTIVE SLEEP APNEA (ADULT) (PEDIATRIC): ICD-10-CM

## 2023-06-28 DIAGNOSIS — G47.09 OTHER INSOMNIA: ICD-10-CM

## 2023-06-28 PROCEDURE — 95811 POLYSOM 6/>YRS CPAP 4/> PARM: CPT

## 2023-06-29 DIAGNOSIS — G47.33 OBSTRUCTIVE SLEEP APNEA (ADULT) (PEDIATRIC): Primary | ICD-10-CM

## 2023-06-29 NOTE — PROGRESS NOTES
Sleep Study Documentation    Pre-Sleep Study       Sleep testing procedure explained to patient:YES    Patient napped prior to study:NO    Caffeine:Dayshift worker after 12PM   Caffeine use:NO    Alcohol:Dayshift workers after 5PM: Alcohol use:NO    Typical day for patient:YES       Study Documentation    Sleep Study Indications: KELLY-diagnosed home study    Sleep Study: Treatment   Optimal PAP pressure: 15 cm H2O  Leak:Small  Snore:Eliminated  REM Obtained:yes  Supplemental O2: no    Minimum SaO2 93%  Baseline SaO2 97%  PAP mask tried (list all) large ResMed AirFit F20  PAP mask choice (final) large ResMed AirFit F20  PAP mask type:full face  PAP pressure at which snoring was eliminated 15 cm H2O  Minimum SaO2 at final PAP pressure 94%  Mode of Therapy:CPAP  ETCO2:No  CPAP changed to BiPAP:No    Mode of Therapy:CPAP    EKG abnormalities: yes:  EPOCH example and comments: PVCs epoch 281, 433, 513, 727, etc    EEG abnormalities: no    Sleep Study Recorded < 2 hours: N/A    Sleep Study Recorded > 2 hours but incomplete study: N/A    Sleep Study Recorded 6 hours but no sleep obtained: NO    Patient classification: retired       Post-Sleep Study    Medication used at bedtime or during sleep study:NO    Patient reports time it took to fall asleep:30 to 60 minutes    Patient reports waking up during study:3 or more times  Patient reports returning to sleep in 10 to 30 minutes  Patient reports sleeping 4 to 6 hours with dreaming  Patient reports sleep during study:typical    Patient rated sleepiness: Somewhat sleepy or tired    PAP treatment:yes: Post PAP treatment patient reports feeling unchanged and would wear PAP mask at home

## 2023-06-30 ENCOUNTER — TELEPHONE (OUTPATIENT)
Dept: SLEEP CENTER | Facility: CLINIC | Age: 63
End: 2023-06-30

## 2023-06-30 LAB
DME PARACHUTE DELIVERY DATE REQUESTED: NORMAL
DME PARACHUTE ITEM DESCRIPTION: NORMAL
DME PARACHUTE ORDER STATUS: NORMAL
DME PARACHUTE SUPPLIER NAME: NORMAL
DME PARACHUTE SUPPLIER PHONE: NORMAL

## 2023-06-30 NOTE — TELEPHONE ENCOUNTER
Called patient and advised CPAP study resulted and CPAP ordered  Scheduled DME set up 7/18/23 in [facility] for CPAP and clinical information sent to [address] [place], [place] [address]  via [place]  Scheduled compliance 1/2/24    Added to wait list

## 2023-07-05 DIAGNOSIS — I48.92 ATRIAL FLUTTER (HCC): ICD-10-CM

## 2023-07-05 RX ORDER — METOPROLOL SUCCINATE 25 MG/1
TABLET, EXTENDED RELEASE ORAL
Qty: 30 TABLET | Refills: 5 | Status: SHIPPED | OUTPATIENT
Start: 2023-07-05 | End: 2023-07-05 | Stop reason: SDUPTHER

## 2023-07-06 RX ORDER — METOPROLOL SUCCINATE 25 MG/1
25 TABLET, EXTENDED RELEASE ORAL DAILY
Qty: 30 TABLET | Refills: 0 | Status: SHIPPED | OUTPATIENT
Start: 2023-07-06

## 2023-07-18 ENCOUNTER — TELEPHONE (OUTPATIENT)
Dept: SLEEP CENTER | Facility: CLINIC | Age: 63
End: 2023-07-18

## 2023-07-18 LAB

## 2023-07-18 NOTE — TELEPHONE ENCOUNTER
Pt. was set up on 7/18/2023 by Jessika Camacho on a ResMed S10 CPAP 11-15 cm EPR 2 and mask AirFit F20 L

## 2023-08-29 ENCOUNTER — OFFICE VISIT (OUTPATIENT)
Dept: SLEEP CENTER | Facility: HOSPITAL | Age: 63
End: 2023-08-29
Payer: COMMERCIAL

## 2023-08-29 VITALS
WEIGHT: 183 LBS | BODY MASS INDEX: 24.25 KG/M2 | DIASTOLIC BLOOD PRESSURE: 68 MMHG | SYSTOLIC BLOOD PRESSURE: 118 MMHG | OXYGEN SATURATION: 98 % | HEIGHT: 73 IN | HEART RATE: 51 BPM

## 2023-08-29 DIAGNOSIS — F41.9 ANXIETY: ICD-10-CM

## 2023-08-29 DIAGNOSIS — I48.92 ATRIAL FLUTTER WITH RAPID VENTRICULAR RESPONSE (HCC): ICD-10-CM

## 2023-08-29 DIAGNOSIS — G47.09 OTHER INSOMNIA: ICD-10-CM

## 2023-08-29 DIAGNOSIS — G47.33 OBSTRUCTIVE SLEEP APNEA (ADULT) (PEDIATRIC): Primary | ICD-10-CM

## 2023-08-29 DIAGNOSIS — J34.2 DEVIATED NASAL SEPTUM: ICD-10-CM

## 2023-08-29 DIAGNOSIS — I50.32 CHRONIC HEART FAILURE WITH PRESERVED EJECTION FRACTION (HFPEF) (HCC): ICD-10-CM

## 2023-08-29 DIAGNOSIS — Z78.9 DIFFICULTY WITH CPAP FULL FACE MASK USE: ICD-10-CM

## 2023-08-29 PROCEDURE — 99214 OFFICE O/P EST MOD 30 MIN: CPT | Performed by: INTERNAL MEDICINE

## 2023-08-29 RX ORDER — VITAMIN B COMPLEX
TABLET ORAL
COMMUNITY

## 2023-08-29 NOTE — PROGRESS NOTES
Follow-Up Note - Sleep Center   Maegan Hough  58 y.o. male  :1960  MBM:[id number]  DOS:2023    CC: I saw this patient for follow-up in clinic today for Sleep disordered breathing, Coexisting Sleep and Medical Problems. Interval changes: Admit was initiated using a ResMed machine. A sleep study to titrate Positive airway pressure (PAP) therapy was undertaken. Patient is here to review results and to just therapy. The study demonstrated sleep disordered breathing was efficiently remediated with PAP at 11-15 cm H2O. although respiratory events were eliminated at the final pressure, there were ongoing respiratory effort related arousals for an index of 8.6/h. The study demonstrated: STEWART (respiratory event index of) 26.8 /hour. There was significant number of central events. minimum oxygen saturation 84% and 3% of total sleep time was spent with saturations less than 90%. The snore index was 2.9 %. PFSH, Problem List, Medications & Allergies were reviewed in EMR. He  has a past medical history of ASD (atrial septal defect), Hyperlipidemia, and Severe mitral regurgitation. He has a current medication list which includes the following prescription(s): amoxicillin, vitamin c, atorvastatin, cholecalciferol, lysine, metoprolol succinate, multiple vitamin, fish oil, vitamin b-12, and vitamin d. PHYSIOLOGICAL DATA REVIEW : Device has been used 30/30 days and average on days used is 2 hours and 26 minutes using PAP > 4 hours/night 20%. Estimated STEWART 5.9/hour with pressure of 11.6cm [email or web address] percentile; patient has not been using non FDA approved devices to sanitize the machine. INTERPRETATION: Compliance is reasonable; Pressure setting is:in acceptable range; ;   SUBJECTIVE: With respect to use of PAP, Laura Weston  is experiencing significant adverse effects:dry mouth/throat, dry nose and mask discomfort. He derives benefit. Is satisfied with sleep and daytime function.    Sleep Routine: Constance Monterroso reports getting 4-5 hrs sleep out of approximately 8 hours in bed; he has difficulty initiating and maintaining sleep . He attributes to mind racing but denies any psychosocial stressors. He arises spontaneously and is not always refreshed. Constance Monterroso denies excessive daytime sleepiness,. He rated [himself] at Total score: 2 /24 on the [place] Sleepiness Scale. Other issues: None reported. Habits:[ reports that he has never smoked. He has never used smokeless tobacco.], [ reports that he does not currently use alcohol.], [ reports that he does not currently use drugs. ], Caffeine use:limited; Exercise routine: regular. ROS: Significant for weight has been stable. He reports nasal congestion due to deviated septum. He is reporting no respiratory or cardiac symptoms. He continues to have anxiety. EXAM: /68 (BP Location: Left arm, Patient Position: Sitting, Cuff Size: Standard)   Pulse (!) 51   Ht 6' 1" (1.854 m)   Wt 83 kg (183 lb)   SpO2 98%   BMI 24.14 kg/m²     Wt Readings from Last 3 Encounters:   08/29/23 83 kg (183 lb)   05/05/23 83.6 kg (184 lb 3.2 oz)   05/02/23 83.9 kg (185 lb)      Patient is well groomed; well appearing. CNS: Alert, orientated, speech clear & coherent  Psych: cooperative and in no distress. Mental state: Appears anxious. H&N: EOMI; NC/AT: No facial pressure marks, no rashes. Skin/Extrem: col & hydration normal; no edema  Resp: Respiratory effort is normal  Physical findings otherwise essentially unchanged from previous. IMPRESSION: Problem List Items & Comorbidities Addressed this Visit    1. Obstructive sleep apnea (adult) (pediatric)  Ambulatory Referral to Sleep Medicine    PAP DME Resupply/Reorder      2. Difficulty with CPAP full face mask use        3. Other insomnia  Ambulatory Referral to Sleep Medicine      4. Anxiety        5. Chronic heart failure with preserved ejection fraction (HFpEF) ([address])        6.  Atrial flutter with rapid ventricular response ([address])        7. Deviated nasal septum            PLAN:  1. I reviewed results of prior studies and physiologic data with the patient. 2. I discussed treatment options with risks and benefits. 3. Treatment with  PAP is medically necessary and Reina Vincent is agreable to continue use. 4. Care of equipment, methods to improve comfort using PAP and importance of compliance with therapy were discussed. 5. Pressure setting: continue 11-15 cmH2O.    6. Rx provided to replace supplies and Care coordinated with DME provider for refitting of the interface. 7. Multi component Cognitive behavioral therapy for Insomnia undertaken - Sleep Restriction, Stimulus control, Relaxation techniques and Sleep hygiene were discussed. 8. If anxiety persists, he may benefit from an SSRI. 9. Nasal symptoms may improve with regular nasal saline rinse up to twice a day, followed by topical nasal steroid (e..g. OTC Flonase, Nasacort) once a day if necessary. He is also considering nasal septoplasty. 10. He elected follow-up in 1 year. He will call sooner if needed      Thank you for allowing me to participate in the care of this patient. Sincerely,     Authenticated electronically on 83/09/40   Board Certified Specialist     Portions of the record may have been created with voice recognition software. Occasional wrong word or "sound a like" substitutions may have occurred due to the inherent limitations of voice recognition software. There may also be notations and random deletions of words or characters from malfunctioning software. Read the chart carefully and recognize, using context, where substitutions/deletions have occurred.

## 2023-08-29 NOTE — PATIENT INSTRUCTIONS
What you can do to improve your sleep: (Sleep Hygiene) Basic rules for a good night's sleep  Create a regular sleep schedule. This will help you form a sleep routine. Keep a record of your sleep patterns, and any sleeping problems you have. Bring the record to follow-up visits with healthcare providers. Avoid prolonged use of light-emitting screens before bedtime or watching TV in bed. Avoid forcing sleep. Do not take naps. Naps could make it hard for you to fall asleep at bedtime. Deal with your worries before bedtime. Keep your bedroom cool, quiet, and dark. Turn on white noise, such as a fan, to help you relax. Do not use your bed for any activity that will keep you awake. Do not read, exercise, eat, or watch TV in your bedroom. Get up if you do not fall asleep within 20 minutes. Move to another room and do something relaxing until you become sleepy. Limit caffeine, alcohol, nicotine and food to earlier in the day. Only drink caffeine in the morning. Do not drink alcohol within 6 hours of bedtime. Do not eat a heavy meal right before you go to bed. Avoid smoking, especially in the evening. Exercise regularly. Daily exercise will help you sleep better. Do not exercise within 4 hours of bedtime. Stimulus control therapy rules  1. Go to bed only when sleepy. 2. Do not watch television, read, eat, or worry while in bed. Use bed only for sleep and sex. 3. Get out of bed if unable to fall asleep within 20 minutes and go to another room. Return to bed only when sleepy. Repeat this step as many times as necessary throughout the night. 4. Set an alarm clock to wake up at a fixed time each morning, including weekends. 5. Do not take a nap during the day. Data from: [address], 1960 [facility] Nonpharmacologic treatments of insomnia. J Clin Psychiatry 6073; 53:37. Go to AASM website for more information: Sleepeducation. org  Recommended Reading: Book by ayanna Andersen   No More sleepless nights    Nursing Support:  When: Monday through Friday 7A-5PM except holidays  Where: Our direct line is [phone]. If you are having a true emergency please call 911. In the event that the line is busy or it is after hours please leave a voice message and we will return your call. Please speak clearly, leaving your full name, birth date, best number to reach you and the reason for your call. Medication refills: We will need the name of the medication, the dosage, the ordering provider, whether you get a 30 or 90 day refill, and the pharmacy name and address. Medications will be ordered by the provider only. Nurses cannot call in prescriptions. Please allow 7 days for medication refills. Physician requested updates: If your provider requested that you call with an update after starting medication, please be ready to provide us the medication and dosage, what time you take your medication, the time you attempt to fall asleep, time you fall asleep, when you wake up, and what time you get out of bed. Sleep Study Results: We will contact you with sleep study results and/or next steps after the physician has reviewed your testing.

## 2023-08-30 ENCOUNTER — TELEPHONE (OUTPATIENT)
Dept: SLEEP CENTER | Facility: CLINIC | Age: 63
End: 2023-08-30

## 2023-08-31 LAB

## 2023-11-20 NOTE — Clinical Note
Pt will be transferred to OR holding area  O-Z Plasty Text: The defect edges were debeveled with a #15 scalpel blade. Given the location of the defect, shape of the defect and the proximity to free margins an O-Z plasty (double transposition flap) was deemed most appropriate. Using a sterile surgical marker, the appropriate transposition flaps were drawn incorporating the defect and placing the expected incisions within the relaxed skin tension lines where possible. The area thus outlined was incised deep to adipose tissue with a #15 scalpel blade. The skin margins were undermined to an appropriate distance in all directions utilizing iris scissors. Hemostasis was achieved with electrocautery. The flaps were then transposed and carried over into place, one clockwise and the other counterclockwise, and anchored with interrupted buried subcutaneous sutures.

## 2023-12-05 ENCOUNTER — OFFICE VISIT (OUTPATIENT)
Dept: CARDIOLOGY CLINIC | Facility: CLINIC | Age: 63
End: 2023-12-05
Payer: COMMERCIAL

## 2023-12-05 VITALS
BODY MASS INDEX: 24.6 KG/M2 | HEIGHT: 73 IN | DIASTOLIC BLOOD PRESSURE: 68 MMHG | HEART RATE: 58 BPM | SYSTOLIC BLOOD PRESSURE: 108 MMHG | WEIGHT: 185.6 LBS

## 2023-12-05 DIAGNOSIS — Q21.11 SECUNDUM ATRIAL SEPTAL DEFECT: Primary | ICD-10-CM

## 2023-12-05 DIAGNOSIS — Z98.890 S/P MVR (MITRAL VALVE REPAIR): ICD-10-CM

## 2023-12-05 DIAGNOSIS — I48.92 ATRIAL FLUTTER (HCC): ICD-10-CM

## 2023-12-05 PROCEDURE — 99214 OFFICE O/P EST MOD 30 MIN: CPT | Performed by: INTERNAL MEDICINE

## 2023-12-05 RX ORDER — METOPROLOL SUCCINATE 25 MG/1
12.5 TABLET, EXTENDED RELEASE ORAL DAILY
Qty: 30 TABLET | Refills: 0 | Status: SHIPPED | OUTPATIENT
Start: 2023-12-05

## 2023-12-05 RX ORDER — FOLIC ACID/MULTIVIT,IRON,MINER 0.4MG-18MG
TABLET ORAL
COMMUNITY

## 2023-12-05 NOTE — PROGRESS NOTES
Cardiology Follow Up    Hermila Zapata  1960  [id number]  [address] [facility]  [phone]    1. Secundum atrial septal defect        2. Atrial flutter (HCC)  metoprolol succinate (TOPROL-XL) 25 mg 24 hr tablet      3. S/P MVR (mitral valve repair)            Interval History: Followup MV repair    Doing well. No chest pain, dyspnea or palpitations. He exercises regularly.      Medical Problems       Problem List       High anion gap metabolic acidosis    Elevated troponin    Elevated d-dimer    CON (acute kidney injury) ([address])    Severe mitral regurgitation    Acute mitral valve rupture (HCC)    Cardiogenic shock (HCC)    Transaminitis    History of repair of congenital atrial septal defect (ASD)    Acute blood loss as cause of postoperative anemia    Atrial flutter with rapid ventricular response (HCC)    Chronic heart failure with preserved ejection fraction (HFpEF) ([address])    Wt Readings from Last 3 Encounters:   12/05/23 84.2 kg (185 lb 9.6 oz)   08/29/23 83 kg (183 lb)   05/05/23 83.6 kg (184 lb 3.2 oz)                 S/P MVR (mitral valve repair)    HLD (hyperlipidemia)    Obstructive sleep apnea (adult) (pediatric)    Mixed sleep apnea        Past Medical History:   Diagnosis Date    ASD (atrial septal defect)     s/p repair    Hyperlipidemia     Severe mitral regurgitation     s/p repair     Social History     Socioeconomic History    Marital status: /Civil Union     Spouse name: Not on file    Number of children: Not on file    Years of education: Not on file    Highest education level: Not on file   Occupational History    Not on file   Tobacco Use    Smoking status: Never    Smokeless tobacco: Never   Vaping Use    Vaping Use: Never used   Substance and Sexual Activity    Alcohol use: Not Currently    Drug use: Not Currently    Sexual activity: Not on file   Other Topics Concern    Not on file   Social History Narrative    Not on file     Social Determinants of Health     Financial Resource Strain: Not on file   Food Insecurity: No Food Insecurity (10/27/2022)    Hunger Vital Sign     Worried About Running Out of Food in the Last Year: Never true     Ran Out of Food in the Last Year: Never true   Transportation Needs: No Transportation Needs (10/27/2022)    PRAPARE - Transportation     Lack of Transportation (Medical): No     Lack of Transportation (Non-Medical): No   Physical Activity: Not on file   Stress: Not on file   Social Connections: Not on file   Intimate Partner Violence: Not on file   Housing Stability: Low Risk  (10/27/2022)    Housing Stability Vital Sign     Unable to Pay for Housing in the Last Year: No     Number of Places Lived in the Last Year: 1     Unstable Housing in the Last Year: No      No family history on file. Past Surgical History:   Procedure Laterality Date    ATRIAL SEPTECTOMY  10/7/2022    Procedure: REPAIR ATRIAL SEPTAL DEFECT (ASD); Surgeon: Mana Kong MD;  [facility];  Service: Cardiac Surgery    CARDIAC CATHETERIZATION N/A 10/7/2022    Procedure: CARDIAC CATHETERIZATION;  Surgeon: Faustino Geronimo DO;  [facility]; Service: Cardiology    CARDIAC CATHETERIZATION N/A 10/7/2022    Procedure: Cardiac Coronary Angiogram;  Surgeon: Faustino Geronimo DO;  [facility]; Service: Cardiology    CARDIAC CATHETERIZATION N/A 10/7/2022    Procedure: Cardiac iabp; Surgeon: Faustino Geronimo DO;  [facility];   Service: Cardiology    INGUINAL HERNIA REPAIR Left     unsure mesh placement    MN REPLACEMENT MITRAL VALVE W/CARDIOPULMONARY BYP N/A 10/7/2022    Procedure: MITRAL VALVE REPAIR WITH 34MM PHYSIO II ANNULOPLASTY RING;  Surgeon: Mana Kong MD;  [facility];  Service: Cardiac Surgery       Current Outpatient Medications:     amoxicillin (AMOXIL) 500 MG tablet, Take 4 tablets (2,000 mg total) by mouth once as needed (dental work) for up to 1 dose, Disp: 4 tablet, Rfl: 5    Ascorbic Acid (vitamin C) 1000 MG tablet, every 24 hours, Disp: , Rfl:     cholecalciferol (VITAMIN D3) 25 mcg (1,000 units) tablet, Vitamin D, Disp: , Rfl:     Cyanocobalamin (Vitamin B-12) 500 MCG LOZG, every 24 hours, Disp: , Rfl:     Krill Oil 350 MG CAPS, as directed Orally, Disp: , Rfl:     Lysine 1000 MG TABS, , Disp: , Rfl:     metoprolol succinate (TOPROL-XL) 25 mg 24 hr tablet, Take 0.5 tablets (12.5 mg total) by mouth daily, Disp: 30 tablet, Rfl: 0    Multiple Vitamin (MULTI-VITAMIN DAILY PO), , Disp: , Rfl:     Omega-3 Fatty Acids (Fish Oil) 500 MG CAPS, daily 350-500mg daily (Patient not taking: Reported on 12/5/2023), Disp: , Rfl:   No Known Allergies    Labs:     Chemistry        Component Value Date/Time    K 4.5 10/28/2022 0501     10/28/2022 0501    CO2 28 10/28/2022 0501    CO2 23 10/07/2022 2104    BUN 17 10/28/2022 0501    CREATININE 1.23 10/28/2022 0501        Component Value Date/Time    CALCIUM 8.9 10/28/2022 0501    ALKPHOS 132 (H) 10/28/2022 0501    AST 42 (H) 05/01/2023 0722    ALT 36 05/01/2023 0722            No results found for: "CHOL"  Lab Results   Component Value Date    HDL 78 05/01/2023     10/07/2022     Lab Results   Component Value Date    LDLCALC 60 05/01/2023    LDLCALC 120 (H) 10/07/2022     Lab Results   Component Value Date    TRIG 53 05/01/2023    TRIG 77 10/07/2022     Lab Results   Component Value Date    CHOLHDL 1.9 05/01/2023       Imaging: No results found. EKG: .    Review of Systems   Constitutional: Negative. HENT: Negative. Eyes: Negative. Cardiovascular: Negative. Respiratory: Negative. Endocrine: Negative. Hematologic/Lymphatic: Negative. Skin: Negative. Musculoskeletal: Negative. Gastrointestinal: Negative. Genitourinary: Negative. Neurological: Negative. Psychiatric/Behavioral: Negative. Allergic/Immunologic: Negative. Vitals:    12/05/23 1442   BP: 108/68   Pulse: 58           Physical Exam  Vitals reviewed. Constitutional:       Appearance: Normal appearance. HENT:      Head: Normocephalic. Nose: Nose normal.      Mouth/Throat:      Mouth: Mucous membranes are moist.      Pharynx: Oropharynx is clear. Eyes:      General: No scleral icterus. Conjunctiva/sclera: Conjunctivae normal.   Cardiovascular:      Rate and Rhythm: Normal rate and regular rhythm. Heart sounds: No murmur heard. No friction rub. No gallop. Pulmonary:      Effort: Pulmonary effort is normal. No respiratory distress. Breath sounds: Normal breath sounds. No wheezing or rales. Abdominal:      General: Abdomen is flat. Bowel sounds are normal. There is no distension. Palpations: Abdomen is soft. Tenderness: There is no abdominal tenderness. There is no guarding. Musculoskeletal:      Cervical back: Normal range of motion and neck supple. Right lower leg: No edema. Left lower leg: No edema. Skin:     General: Skin is warm and dry. Neurological:      General: No focal deficit present. Mental Status: He is alert and oriented to person, place, and time. Psychiatric:         Mood and Affect: Mood normal.         Behavior: Behavior normal.         Discussion/Summary:    Severe Mitral Regurg S/P Repair. Mild MR on his FLIP while in uncontrolled atrial flutter. He is in NSR and no afib on his zio. Off AC. No symptomatic PAF recently. He checks his HR at home and has been stable. Okay to discontinue atorvastatin. Decrease metoprolol to 12.5 mg daily. The patient was counseled regarding diagnostic results, instructions for management, risk factor reductions, impressions. total time of encounter was 25 minutes and 15 minutes was spent counseling.

## 2024-02-07 ENCOUNTER — TELEPHONE (OUTPATIENT)
Dept: CARDIOLOGY CLINIC | Facility: CLINIC | Age: 64
End: 2024-02-07

## 2024-03-15 LAB — HBA1C MFR BLD HPLC: 5.3 %

## 2024-04-29 DIAGNOSIS — I48.92 ATRIAL FLUTTER (HCC): ICD-10-CM

## 2024-04-29 NOTE — TELEPHONE ENCOUNTER
Reason for call:   [x] Refill   [] Prior Auth  [] Other:     Office:   [] PCP/Provider -   [x] Specialty/Provider - cardio/ [place]    Medication: metoprolol succinate (TOPROL-XL) 25 mg 24 hr tablet     Dose/Frequency:     Take 0.5 tablets (12.5 mg total) by mouth daily       Quantity: #45    Pharmacy: [address] [phone]     Does the patient have enough for 3 days?   [x] Yes   [] No - Send as HP to POD

## 2024-04-30 RX ORDER — METOPROLOL SUCCINATE 25 MG/1
12.5 TABLET, EXTENDED RELEASE ORAL DAILY
Qty: 90 TABLET | Refills: 1 | Status: SHIPPED | OUTPATIENT
Start: 2024-04-30

## 2024-08-01 DIAGNOSIS — Z98.890 S/P MVR (MITRAL VALVE REPAIR): ICD-10-CM

## 2024-08-02 NOTE — TELEPHONE ENCOUNTER
Requested medication(s) are due for refill today: Yes  Patient has already received a courtesy refill: No  Other reason request has been forwarded to provider: Cannot be delegated

## 2024-08-05 RX ORDER — AMOXICILLIN 500 MG/1
CAPSULE ORAL
Qty: 4 CAPSULE | Refills: 5 | Status: SHIPPED | OUTPATIENT
Start: 2024-08-05 | End: 2024-08-06

## 2024-08-19 DIAGNOSIS — Z98.890 S/P MVR (MITRAL VALVE REPAIR): Primary | ICD-10-CM

## 2024-08-19 RX ORDER — AMOXICILLIN 500 MG/1
2000 TABLET, FILM COATED ORAL ONCE AS NEEDED
Qty: 4 TABLET | Refills: 0 | Status: SHIPPED | OUTPATIENT
Start: 2024-08-19 | End: 2024-11-19

## 2024-08-19 NOTE — TELEPHONE ENCOUNTER
Patient ha appointment on 8/26/2024 for a cavity to be filled with his Dentist.  He is requesting a refill of Amoxicillan 500mg to be sent to [facility] for this procedure

## 2024-10-15 ENCOUNTER — OFFICE VISIT (OUTPATIENT)
Dept: CARDIOLOGY CLINIC | Facility: CLINIC | Age: 64
End: 2024-10-15
Payer: MEDICARE

## 2024-10-15 VITALS
DIASTOLIC BLOOD PRESSURE: 82 MMHG | BODY MASS INDEX: 23.86 KG/M2 | HEIGHT: 73 IN | HEART RATE: 81 BPM | WEIGHT: 180 LBS | SYSTOLIC BLOOD PRESSURE: 120 MMHG

## 2024-10-15 DIAGNOSIS — I48.4 ATYPICAL ATRIAL FLUTTER (HCC): Primary | ICD-10-CM

## 2024-10-15 DIAGNOSIS — Z98.890 S/P MVR (MITRAL VALVE REPAIR): ICD-10-CM

## 2024-10-15 DIAGNOSIS — I48.3 TYPICAL ATRIAL FLUTTER (HCC): ICD-10-CM

## 2024-10-15 DIAGNOSIS — I50.32 CHRONIC HEART FAILURE WITH PRESERVED EJECTION FRACTION (HFPEF) (HCC): ICD-10-CM

## 2024-10-15 PROCEDURE — 99214 OFFICE O/P EST MOD 30 MIN: CPT | Performed by: INTERNAL MEDICINE

## 2024-10-15 RX ORDER — ASPIRIN 81 MG/1
81 TABLET, CHEWABLE ORAL DAILY
COMMUNITY

## 2024-10-15 NOTE — PROGRESS NOTES
Cardiology Follow Up    Roney Alvarado  1960  [id number]  [facility] [place]  [address]ennifer [address]  [address]  [facility] [address]  [phone]    1. Atypical atrial flutter (HCC)        2. S/P MVR (mitral valve repair)        3. Chronic heart failure with preserved ejection fraction (HFpEF) ([facility])        4. Typical atrial flutter (HCC)            Interval History: Followup for mitral regurgitation/repair and atrial flutter.     Overall he is doing well. He has no chest pain, dyspnea or palpitations. He checks his BP and heart rhythm at home and has been normal.     Medical Problems       Problem List       High anion gap metabolic acidosis    Elevated troponin    Elevated d-dimer    CON (acute kidney injury) ([facility])    Severe mitral regurgitation    Acute mitral valve rupture ([facility])    Cardiogenic shock ([facility])    Transaminitis    History of repair of congenital atrial septal defect (ASD)    Acute blood loss as cause of postoperative anemia    Atrial flutter with rapid ventricular response (HCC)    Chronic heart failure with preserved ejection fraction (HFpEF) ([facility])    Wt Readings from Last 3 Encounters:   10/15/24 81.6 kg (180 lb)   12/05/23 84.2 kg (185 lb 9.6 oz)   08/29/23 83 kg (183 lb)                 S/P MVR (mitral valve repair)    HLD (hyperlipidemia)    Obstructive sleep apnea (adult) (pediatric)    Mixed sleep apnea    Secundum atrial septal defect        Past Medical History:   Diagnosis Date    ASD (atrial septal defect)     s/p repair    Hyperlipidemia     Severe mitral regurgitation     s/p repair     Social History     Socioeconomic History    Marital status: /Civil Union     Spouse name: Not on file    Number of children: Not on file    Years of education: Not on file    Highest education level: Not on file   Occupational History    Not on file   Tobacco Use    Smoking status: Never    Smokeless tobacco: Never   Vaping Use     Vaping status: Never Used   Substance and Sexual Activity    Alcohol use: Not Currently    Drug use: Not Currently    Sexual activity: Not on file   Other Topics Concern    Not on file   Social History Narrative    Not on file     Social Determinants of Health     Financial Resource Strain: Not on file   Food Insecurity: No Food Insecurity (10/27/2022)    Hunger Vital Sign     Worried About Running Out of Food in the Last Year: Never true     Ran Out of Food in the Last Year: Never true   Transportation Needs: No Transportation Needs (10/27/2022)    PRAPARE - Transportation     Lack of Transportation (Medical): No     Lack of Transportation (Non-Medical): No   Physical Activity: Not on file   Stress: Not on file   Social Connections: Not on file   Intimate Partner Violence: Not on file   Housing Stability: Low Risk  (10/27/2022)    Housing Stability Vital Sign     Unable to Pay for Housing in the Last Year: No     Number of Places Lived in the Last Year: 1     Unstable Housing in the Last Year: No      No family history on file.  Past Surgical History:   Procedure Laterality Date    ATRIAL SEPTECTOMY  10/7/2022    Procedure: REPAIR ATRIAL SEPTAL DEFECT (ASD);  Surgeon: RAMIRO Browne MD;  [facility];  Service: Cardiac Surgery    CARDIAC CATHETERIZATION N/A 10/7/2022    Procedure: CARDIAC CATHETERIZATION;  Surgeon: Cindy Brown DO;  [facility];  Service: Cardiology    CARDIAC CATHETERIZATION N/A 10/7/2022    Procedure: Cardiac Coronary Angiogram;  Surgeon: Cindy Brown DO;  [facility];  Service: Cardiology    CARDIAC CATHETERIZATION N/A 10/7/2022    Procedure: Cardiac iabp;  Surgeon: Cindy Brown DO;  [facility];  Service: Cardiology    INGUINAL HERNIA REPAIR Left     unsure mesh placement    AK REPLACEMENT MITRAL VALVE W/CARDIOPULMONARY BYP N/A 10/7/2022    Procedure: MITRAL VALVE REPAIR WITH 34MM PHYSIO II ANNULOPLASTY  "RING;  Surgeon: RAMIRO Browne MD;  [facility];  Service: Cardiac Surgery       Current Outpatient Medications:     amoxicillin (AMOXIL) 500 MG tablet, Take 4 tablets (2,000 mg total) by mouth once as needed (Dental work) for up to 1 dose 1 hour before dental appointment, Disp: 4 tablet, Rfl: 0    Ascorbic Acid (vitamin C) 1000 MG tablet, every 24 hours, Disp: , Rfl:     aspirin 81 mg chewable tablet, Chew 81 mg daily, Disp: , Rfl:     Krill Oil 350 MG CAPS, as directed Orally, Disp: , Rfl:     Lysine 1000 MG TABS, , Disp: , Rfl:     metoprolol succinate (TOPROL-XL) 25 mg 24 hr tablet, Take 0.5 tablets (12.5 mg total) by mouth daily, Disp: 90 tablet, Rfl: 1    Multiple Vitamin (MULTI-VITAMIN DAILY PO), , Disp: , Rfl:   No Known Allergies    Labs:     Chemistry        Component Value Date/Time    K 4.5 10/28/2022 0501     10/28/2022 0501    CO2 28 10/28/2022 0501    CO2 23 10/07/2022 2104    BUN 17 10/28/2022 0501    CREATININE 1.23 10/28/2022 0501        Component Value Date/Time    CALCIUM 8.9 10/28/2022 0501    ALKPHOS 132 (H) 10/28/2022 0501    AST 42 (H) 05/01/2023 0722    ALT 36 05/01/2023 0722            No results found for: \"CHOL\"  Lab Results   Component Value Date    HDL 78 05/01/2023     10/07/2022     Lab Results   Component Value Date    LDLCALC 60 05/01/2023    LDLCALC 120 (H) 10/07/2022     Lab Results   Component Value Date    TRIG 53 05/01/2023    TRIG 77 10/07/2022     Lab Results   Component Value Date    CHOLHDL 1.9 05/01/2023       Imaging: No results found.    EKG: .    Review of Systems   Constitutional: Negative.   HENT: Negative.     Eyes: Negative.    Cardiovascular: Negative.    Respiratory: Negative.     Endocrine: Negative.    Hematologic/Lymphatic: Negative.    Skin: Negative.    Musculoskeletal: Negative.    Gastrointestinal: Negative.    Genitourinary: Negative.    Neurological: Negative.    Psychiatric/Behavioral: Negative.     Allergic/Immunologic: " Negative.        Vitals:    10/15/24 1505   BP: 120/82   Pulse: 81           Physical Exam  Vitals reviewed.   Constitutional:       Appearance: Normal appearance.   HENT:      Head: Normocephalic.      Nose: Nose normal.      Mouth/Throat:      Mouth: Mucous membranes are moist.      Pharynx: Oropharynx is clear.   Eyes:      General: No scleral icterus.     Conjunctiva/sclera: Conjunctivae normal.   Cardiovascular:      Rate and Rhythm: Normal rate and regular rhythm.      Heart sounds: No murmur heard.     No friction rub. No gallop.   Pulmonary:      Effort: Pulmonary effort is normal. No respiratory distress.      Breath sounds: Normal breath sounds. No wheezing or rales.   Abdominal:      General: Abdomen is flat. Bowel sounds are normal. There is no distension.      Palpations: Abdomen is soft.      Tenderness: There is no abdominal tenderness. There is no guarding.   Musculoskeletal:      Cervical back: Normal range of motion and neck supple.      Right lower leg: No edema.      Left lower leg: No edema.   Skin:     General: Skin is warm and dry.   Neurological:      General: No focal deficit present.      Mental Status: He is alert and oriented to person, place, and time.   Psychiatric:         Mood and Affect: Mood normal.         Behavior: Behavior normal.         Discussion/Summary:    Severe Mitral Regurg S/P Repair. Mild MR on his FLIP while in uncontrolled atrial flutter. He is in NSR and no afib on his zio. Off AC.  No symptomatic PAF recently. He checks his HR and rhythm on Nuenz and no evidence of atrial fibrillation or flutter.     I have spent a total time of 25 minutes in caring for this patient on the day of the visit/encounter including Diagnostic results, Prognosis, Risks and benefits of tx options, Instructions for management, Patient and family education, and Importance of tx compliance.

## 2025-02-12 DIAGNOSIS — Z98.890 S/P MVR (MITRAL VALVE REPAIR): Primary | ICD-10-CM

## 2025-02-12 NOTE — TELEPHONE ENCOUNTER
Patient called the RX Refill Line. Message is being forwarded to the office.     Patient is requesting amoxicillin 500 quantity 4 for his upcoming dentist appt next week  He asked that it be sent to [facility] pharmacy     Please contact patient at

## 2025-02-14 RX ORDER — AMOXICILLIN 500 MG/1
2000 CAPSULE ORAL AS NEEDED
Qty: 4 CAPSULE | Refills: 3 | Status: SHIPPED | OUTPATIENT
Start: 2025-02-14 | End: 2026-02-14

## 2025-04-08 ENCOUNTER — TELEPHONE (OUTPATIENT)
Age: 65
End: 2025-04-08

## 2025-04-08 DIAGNOSIS — Z98.890 S/P MVR (MITRAL VALVE REPAIR): Primary | ICD-10-CM

## 2025-04-08 DIAGNOSIS — I48.92 ATRIAL FLUTTER (HCC): ICD-10-CM

## 2025-04-08 RX ORDER — METOPROLOL SUCCINATE 25 MG/1
12.5 TABLET, EXTENDED RELEASE ORAL DAILY
Qty: 90 TABLET | Refills: 1 | Status: SHIPPED | OUTPATIENT
Start: 2025-04-08

## 2025-04-08 NOTE — TELEPHONE ENCOUNTER
"Pt called per PCP to make Dr. Ying aware PCP heard murmer at visit yesterday 4/7 (full note in care everywhere for review).    \"2. H/O mitral valve repair   Notes: Had MV repair in 2022. Told afterward there was a very faint murmur. No murmur noted in cardiology note from 2023. Substantial murmur heard today. Denies any CP, SOB, palpitations, edema. Says he has cardiology appt in June. He will call cardiology to see if appt can be moved up.\"      Pt is currently scheduled for 10/23/25, next available apt w/ Dr. Ying 9/23/25.    Advised pt I would make Dr. Ying aware of PCP findings and office will call back w/ further recommendations.   "

## 2025-04-10 LAB — HBA1C MFR BLD HPLC: 5.3 %

## 2025-04-28 ENCOUNTER — HOSPITAL ENCOUNTER (OUTPATIENT)
Dept: NON INVASIVE DIAGNOSTICS | Age: 65
Discharge: HOME | End: 2025-04-28
Attending: INTERNAL MEDICINE
Payer: MEDICARE

## 2025-04-28 VITALS
SYSTOLIC BLOOD PRESSURE: 120 MMHG | BODY MASS INDEX: 23.86 KG/M2 | DIASTOLIC BLOOD PRESSURE: 82 MMHG | HEIGHT: 73 IN | WEIGHT: 180 LBS | HEART RATE: 72 BPM

## 2025-04-28 DIAGNOSIS — Z98.890 S/P MVR (MITRAL VALVE REPAIR): ICD-10-CM

## 2025-04-28 PROCEDURE — 93306 TTE W/DOPPLER COMPLETE: CPT | Performed by: INTERNAL MEDICINE

## 2025-04-28 PROCEDURE — 93306 TTE W/DOPPLER COMPLETE: CPT

## 2025-04-29 DIAGNOSIS — Z98.890 S/P MVR (MITRAL VALVE REPAIR): Primary | ICD-10-CM

## 2025-04-29 LAB
AORTIC ROOT: 3.7 CM
ASCENDING AORTA: 3.7 CM
BSA FOR ECHO PROCEDURE: 2.06 M2
E WAVE DECELERATION TIME: 139 MS
E/A RATIO: 1.33
FRACTIONAL SHORTENING: 27 (ref 28–44)
INTERVENTRICULAR SEPTUM IN DIASTOLE (PARASTERNAL SHORT AXIS VIEW): 1.2 CM
INTERVENTRICULAR SEPTUM: 1.2 CM (ref 0.6–1.1)
LAAS-AP2: 23.4 CM2
LAAS-AP4: 29.9 CM2
LEFT ATRIUM SIZE: 4.2 CM
LEFT ATRIUM VOLUME (MOD BIPLANE): 105 ML
LEFT ATRIUM VOLUME INDEX (MOD BIPLANE): 51 ML/M2
LEFT INTERNAL DIMENSION IN SYSTOLE: 2.2 CM (ref 2.1–4)
LEFT VENTRICLE DIASTOLIC VOLUME (MOD BIPLANE): 194 ML
LEFT VENTRICLE DIASTOLIC VOLUME INDEX (MOD BIPLANE): 94.2 ML/M2
LEFT VENTRICLE SYSTOLIC VOLUME (MOD BIPLANE): 95 ML
LEFT VENTRICLE SYSTOLIC VOLUME INDEX (MOD BIPLANE): 46.1 ML/M2
LEFT VENTRICULAR INTERNAL DIMENSION IN DIASTOLE: 3 CM (ref 3.5–6)
LEFT VENTRICULAR POSTERIOR WALL IN END DIASTOLE: 1.5 CM
LEFT VENTRICULAR STROKE VOLUME: 20 ML
LV EF BIPLANE MOD: 51 %
LV EF US.2D.A4C+ESTIMATED: 48 %
LVSV (TEICH): 20 ML
MV E'TISSUE VEL-LAT: 19 CM/S
MV E'TISSUE VEL-SEP: 10 CM/S
MV PEAK A VEL: 1.12 M/S
MV PEAK E VEL: 149 CM/S
MV STENOSIS PRESSURE HALF TIME: 40 MS
MV VALVE AREA P 1/2 METHOD: 5.5
RIGHT ATRIUM AREA SYSTOLE A4C: 26.2 CM2
RIGHT VENTRICLE ID DIMENSION: 4.4 CM
SL CV LEFT ATRIUM LENGTH A2C: 5.3 CM
SL CV LV EF: 55
SL CV PED ECHO LEFT VENTRICLE DIASTOLIC VOLUME (MOD BIPLANE) 2D: 35 ML
SL CV PED ECHO LEFT VENTRICLE SYSTOLIC VOLUME (MOD BIPLANE) 2D: 15 ML
TR MAX PG: 25 MMHG
TR PEAK VELOCITY: 2.5 M/S
TRICUSPID ANNULAR PLANE SYSTOLIC EXCURSION: 1.8 CM
TRICUSPID VALVE PEAK REGURGITATION VELOCITY: 2.48 M/S

## 2025-05-01 ENCOUNTER — ANESTHESIA (OUTPATIENT)
Dept: ANESTHESIOLOGY | Facility: HOSPITAL | Age: 65
End: 2025-05-01

## 2025-05-01 ENCOUNTER — ANESTHESIA EVENT (OUTPATIENT)
Dept: ANESTHESIOLOGY | Facility: HOSPITAL | Age: 65
End: 2025-05-01

## 2025-05-02 ENCOUNTER — HOSPITAL ENCOUNTER (OUTPATIENT)
Dept: NON INVASIVE DIAGNOSTICS | Facility: HOSPITAL | Age: 65
Discharge: HOME | End: 2025-05-02
Attending: INTERNAL MEDICINE
Payer: MEDICARE

## 2025-05-02 ENCOUNTER — ANESTHESIA EVENT (OUTPATIENT)
Dept: NON INVASIVE DIAGNOSTICS | Facility: HOSPITAL | Age: 65
End: 2025-05-02
Payer: MEDICARE

## 2025-05-02 ENCOUNTER — ANESTHESIA (OUTPATIENT)
Dept: NON INVASIVE DIAGNOSTICS | Facility: HOSPITAL | Age: 65
End: 2025-05-02
Payer: MEDICARE

## 2025-05-02 VITALS
HEART RATE: 73 BPM | TEMPERATURE: 97.5 F | RESPIRATION RATE: 18 BRPM | DIASTOLIC BLOOD PRESSURE: 65 MMHG | HEIGHT: 73 IN | BODY MASS INDEX: 23.84 KG/M2 | WEIGHT: 179.9 LBS | OXYGEN SATURATION: 96 % | SYSTOLIC BLOOD PRESSURE: 114 MMHG

## 2025-05-02 DIAGNOSIS — Z98.890 S/P MVR (MITRAL VALVE REPAIR): ICD-10-CM

## 2025-05-02 LAB
BASOPHILS # BLD AUTO: 0.02 THOUSANDS/ÂΜL (ref 0–0.1)
BASOPHILS NFR BLD AUTO: 0 % (ref 0–1)
EOSINOPHIL # BLD AUTO: 0.32 THOUSAND/ÂΜL (ref 0–0.61)
EOSINOPHIL NFR BLD AUTO: 4 % (ref 0–6)
ERYTHROCYTE [DISTWIDTH] IN BLOOD BY AUTOMATED COUNT: 12.7 % (ref 11.6–15.1)
HCT VFR BLD AUTO: 43.4 % (ref 36.5–49.3)
HGB BLD-MCNC: 14.3 G/DL (ref 12–17)
IMM GRANULOCYTES # BLD AUTO: 0.04 THOUSAND/UL (ref 0–0.2)
IMM GRANULOCYTES NFR BLD AUTO: 1 % (ref 0–2)
LYMPHOCYTES # BLD AUTO: 2.03 THOUSANDS/ÂΜL (ref 0.6–4.47)
LYMPHOCYTES NFR BLD AUTO: 27 % (ref 14–44)
MCH RBC QN AUTO: 29.7 PG (ref 26.8–34.3)
MCHC RBC AUTO-ENTMCNC: 32.9 G/DL (ref 31.4–37.4)
MCV RBC AUTO: 90 FL (ref 82–98)
MONOCYTES # BLD AUTO: 0.58 THOUSAND/ÂΜL (ref 0.17–1.22)
MONOCYTES NFR BLD AUTO: 8 % (ref 4–12)
NEUTROPHILS # BLD AUTO: 4.45 THOUSANDS/ÂΜL (ref 1.85–7.62)
NEUTS SEG NFR BLD AUTO: 60 % (ref 43–75)
NRBC BLD AUTO-RTO: 0 /100 WBCS
PLATELET # BLD AUTO: 199 THOUSANDS/UL (ref 149–390)
PMV BLD AUTO: 11.2 FL (ref 8.9–12.7)
RBC # BLD AUTO: 4.82 MILLION/UL (ref 3.88–5.62)
SL CV LV EF: 55
WBC # BLD AUTO: 7.44 THOUSAND/UL (ref 4.31–10.16)

## 2025-05-02 PROCEDURE — 85025 COMPLETE CBC W/AUTO DIFF WBC: CPT | Performed by: INTERNAL MEDICINE

## 2025-05-02 PROCEDURE — 93320 DOPPLER ECHO COMPLETE: CPT | Performed by: INTERNAL MEDICINE

## 2025-05-02 PROCEDURE — 76376 3D RENDER W/INTRP POSTPROCES: CPT

## 2025-05-02 PROCEDURE — 93312 ECHO TRANSESOPHAGEAL: CPT | Performed by: INTERNAL MEDICINE

## 2025-05-02 PROCEDURE — 93312 ECHO TRANSESOPHAGEAL: CPT

## 2025-05-02 PROCEDURE — 93325 DOPPLER ECHO COLOR FLOW MAPG: CPT | Performed by: INTERNAL MEDICINE

## 2025-05-02 PROCEDURE — 87040 BLOOD CULTURE FOR BACTERIA: CPT | Performed by: INTERNAL MEDICINE

## 2025-05-02 RX ORDER — SODIUM CHLORIDE, SODIUM LACTATE, POTASSIUM CHLORIDE, CALCIUM CHLORIDE 600; 310; 30; 20 MG/100ML; MG/100ML; MG/100ML; MG/100ML
INJECTION, SOLUTION INTRAVENOUS CONTINUOUS PRN
Status: DISCONTINUED | OUTPATIENT
Start: 2025-05-02 | End: 2025-05-02

## 2025-05-02 RX ORDER — LIDOCAINE HYDROCHLORIDE 20 MG/ML
INJECTION, SOLUTION EPIDURAL; INFILTRATION; INTRACAUDAL; PERINEURAL AS NEEDED
Status: DISCONTINUED | OUTPATIENT
Start: 2025-05-02 | End: 2025-05-02

## 2025-05-02 RX ORDER — EPHEDRINE SULFATE 50 MG/ML
INJECTION INTRAVENOUS AS NEEDED
Status: DISCONTINUED | OUTPATIENT
Start: 2025-05-02 | End: 2025-05-02

## 2025-05-02 RX ORDER — PROPOFOL 10 MG/ML
INJECTION, EMULSION INTRAVENOUS AS NEEDED
Status: DISCONTINUED | OUTPATIENT
Start: 2025-05-02 | End: 2025-05-02

## 2025-05-02 RX ORDER — FENTANYL CITRATE 50 UG/ML
INJECTION, SOLUTION INTRAMUSCULAR; INTRAVENOUS AS NEEDED
Status: DISCONTINUED | OUTPATIENT
Start: 2025-05-02 | End: 2025-05-02

## 2025-05-02 RX ADMIN — EPHEDRINE SULFATE 10 MG: 50 INJECTION INTRAVENOUS at 13:20

## 2025-05-02 RX ADMIN — PROPOFOL 25 MG: 10 INJECTION, EMULSION INTRAVENOUS at 13:16

## 2025-05-02 RX ADMIN — LIDOCAINE HYDROCHLORIDE 100 MG: 20 INJECTION, SOLUTION EPIDURAL; INFILTRATION; INTRACAUDAL; PERINEURAL at 13:14

## 2025-05-02 RX ADMIN — PROPOFOL 25 MG: 10 INJECTION, EMULSION INTRAVENOUS at 13:24

## 2025-05-02 RX ADMIN — EPHEDRINE SULFATE 5 MG: 50 INJECTION INTRAVENOUS at 13:26

## 2025-05-02 RX ADMIN — PROPOFOL 100 MG: 10 INJECTION, EMULSION INTRAVENOUS at 13:14

## 2025-05-02 RX ADMIN — PROPOFOL 25 MG: 10 INJECTION, EMULSION INTRAVENOUS at 13:18

## 2025-05-02 RX ADMIN — PROPOFOL 25 MG: 10 INJECTION, EMULSION INTRAVENOUS at 13:21

## 2025-05-02 RX ADMIN — SODIUM CHLORIDE, SODIUM LACTATE, POTASSIUM CHLORIDE, AND CALCIUM CHLORIDE: .6; .31; .03; .02 INJECTION, SOLUTION INTRAVENOUS at 12:56

## 2025-05-02 RX ADMIN — FENTANYL CITRATE 50 MCG: 50 INJECTION, SOLUTION INTRAMUSCULAR; INTRAVENOUS at 13:09

## 2025-05-02 NOTE — H&P
H&P - Cardiology   Name: Roney Alvaardo 65 y.o. male I MRN: [id number]  Unit/Bed#:  I Date of Admission: 5/2/2025   Date of Service: 5/2/2025 I Hospital Day: 0     Assessment & Plan  S/P MVR (mitral valve repair)  Significant MR on last echo. FLIP today for further eval     History of Present Illness   Roney Alvarado is a 65 y.o. male who presents with history of MV repair. Patient for FLIP today. .    Review of Systems   Constitutional:  Negative for chills and fever.   HENT:  Negative for ear pain and sore throat.    Eyes:  Negative for pain and visual disturbance.   Respiratory:  Negative for cough and shortness of breath.    Cardiovascular:  Negative for chest pain and palpitations.   Gastrointestinal:  Negative for abdominal pain and vomiting.   Genitourinary:  Negative for dysuria and hematuria.   Musculoskeletal:  Negative for arthralgias and back pain.   Skin:  Negative for color change and rash.   Neurological:  Negative for seizures and syncope.   All other systems reviewed and are negative.    Medical History Review: I have reviewed the patient's PMH, PSH, Social History, Family History, Meds, and Allergies     Objective :  Temp:  [97.5 °F (36.4 °C)] 97.5 °F (36.4 °C)  HR:  [78] 78  BP: (131)/(82) 131/82  Resp:  [18] 18  SpO2:  [98 %] 98 %  O2 Device: EtCO2 mask  Orthostatic Blood Pressures      Flowsheet Row Most Recent Value   Blood Pressure 131/82 filed at 05/02/2025 1301          First Weight: Weight - Scale: 81.6 kg (179 lb 14.3 oz) (05/02/25 1219)  Vitals:    05/02/25 1219 05/02/25 1302   Weight: 81.6 kg (179 lb 14.3 oz) 81.6 kg (179 lb 14.3 oz)       Physical Exam  Vitals and nursing note reviewed.   Constitutional:       General: He is not in acute distress.     Appearance: He is well-developed.   HENT:      Head: Normocephalic and atraumatic.   Eyes:      Conjunctiva/sclera: Conjunctivae normal.   Cardiovascular:      Rate and Rhythm: Normal rate and regular rhythm.      Heart sounds: Murmur heard.  "  Pulmonary:      Effort: Pulmonary effort is normal. No respiratory distress.      Breath sounds: Normal breath sounds.   Abdominal:      Palpations: Abdomen is soft.      Tenderness: There is no abdominal tenderness.   Musculoskeletal:         General: No swelling.      Cervical back: Neck supple.   Skin:     General: Skin is warm and dry.      Capillary Refill: Capillary refill takes less than 2 seconds.   Neurological:      Mental Status: He is alert.   Psychiatric:         Mood and Affect: Mood normal.           Lab Results: I have reviewed the following results:CBC/BMP: No new results in last 24 hours. , Creatinine Clearance: CrCl cannot be calculated (Patient's most recent lab result is older than the maximum 7 days allowed.)., LFTs: No new results in last 24 hours.               Lab Results   Component Value Date    HGBA1C 5.3 04/10/2025     No results found for: \"CKTOTAL\", \"CKMB\", \"CKMBINDEX\", \"TROPONINI\"    Imaging Results Review: No pertinent imaging studies reviewed.  Other Study Results Review: No additional pertinent studies reviewed.    VTE Prophylaxis:       "

## 2025-05-02 NOTE — ANESTHESIA PREPROCEDURE EVALUATION
Procedure:  PRE-OP ONLY    Relevant Problems   CARDIO   (+) Acute mitral valve rupture (HCC)   (+) Atrial flutter with rapid ventricular response (HCC)   (+) HLD (hyperlipidemia)   (+) Severe mitral regurgitation      /RENAL   (+) CON (acute kidney injury) (HCC)      HEMATOLOGY   (+) Acute blood loss as cause of postoperative anemia      PULMONARY   (+) Mixed sleep apnea   (+) Obstructive sleep apnea (adult) (pediatric)      MV repair in 2022     ECHO 4/2025: Left Ventricle: Left ventricular cavity size is normal. Wall thickness is mildly increased. The left ventricular ejection fraction is 55%. Systolic function is normal. Wall motion is normal. Diastolic function is normal.    Right Ventricle: Right ventricular cavity size is mildly dilated.    Left Atrium: The atrium is mildly dilated.    Right Atrium: The atrium is mildly dilated.    Aortic Valve: There is mild regurgitation.    Mitral Valve: The valve has been repaired with an annular ring. There is severe regurgitation with an anteriorly directed jet.    Tricuspid Valve: There is mild regurgitation.    Pulmonic Valve: There is mild regurgitation.       Anesthesia Plan  ASA Score- 3     Anesthesia Type- IV sedation with anesthesia with ASA Monitors.         Additional Monitors:     Airway Plan:            Plan Factors-    Chart reviewed.    Patient summary reviewed.                  Induction- intravenous.    Postoperative Plan-         Informed Consent-       NPO Status:  No vitals data found for the desired time range.

## 2025-05-02 NOTE — ANESTHESIA PREPROCEDURE EVALUATION
Procedure:  FLIP    Relevant Problems   CARDIO   (+) Acute mitral valve rupture (HCC)   (+) Atrial flutter with rapid ventricular response (HCC)   (+) HLD (hyperlipidemia)   (+) Severe mitral regurgitation      /RENAL   (+) CON (acute kidney injury) (HCC)      HEMATOLOGY   (+) Acute blood loss as cause of postoperative anemia      PULMONARY   (+) Mixed sleep apnea   (+) Obstructive sleep apnea (adult) (pediatric)      MV repair in 2022     ECHO 4/2025: Left Ventricle: Left ventricular cavity size is normal. Wall thickness is mildly increased. The left ventricular ejection fraction is 55%. Systolic function is normal. Wall motion is normal. Diastolic function is normal.    Right Ventricle: Right ventricular cavity size is mildly dilated.    Left Atrium: The atrium is mildly dilated.    Right Atrium: The atrium is mildly dilated.    Aortic Valve: There is mild regurgitation.    Mitral Valve: The valve has been repaired with an annular ring. There is severe regurgitation with an anteriorly directed jet.    Tricuspid Valve: There is mild regurgitation.    Pulmonic Valve: There is mild regurgitation.  Physical Exam    Airway    Mallampati score: III  TM Distance: >3 FB  Neck ROM: full     Dental   No notable dental hx     Cardiovascular      Pulmonary      Other Findings        Anesthesia Plan  ASA Score- 3     Anesthesia Type- IV sedation with anesthesia with ASA Monitors.         Additional Monitors:     Airway Plan:            Plan Factors-    Chart reviewed.    Patient summary reviewed.                  Induction- intravenous.    Postoperative Plan-         Informed Consent- Anesthetic plan and risks discussed with patient.  I personally reviewed this patient with the CRNA. Discussed and agreed on the Anesthesia Plan with the CRNA..      NPO Status:  Vitals Value Taken Time   Date of last liquid 05/02/25 05/02/25 1211   Time of last liquid 0630 05/02/25 1211   Date of last solid 05/01/25 05/02/25 1211   Time of last  solid 2030 05/02/25 9680

## 2025-05-02 NOTE — ANESTHESIA POSTPROCEDURE EVALUATION
Post-Op Assessment Note    CV Status:  Stable    Pain management: adequate       Mental Status:  Awake and lethargic   Hydration Status:  Stable   PONV Controlled:  None   Airway Patency:  Patent     Post Op Vitals Reviewed: Yes    No anethesia notable event occurred.    Staff: CRNA           Last Filed PACU Vitals:  Vitals Value Taken Time   Temp     Pulse 84 05/02/25 1330   /74 05/02/25 1330   Resp 18 05/02/25 1330   SpO2 96 % 05/02/25 1330

## 2025-05-04 LAB
BACTERIA BLD CULT: NORMAL
BACTERIA BLD CULT: NORMAL

## 2025-05-07 LAB
BACTERIA BLD CULT: NORMAL
BACTERIA BLD CULT: NORMAL

## 2025-05-09 ENCOUNTER — OFFICE VISIT (OUTPATIENT)
Dept: CARDIAC SURGERY | Facility: CLINIC | Age: 65
End: 2025-05-09
Payer: MEDICARE

## 2025-05-09 VITALS
OXYGEN SATURATION: 100 % | TEMPERATURE: 96.8 F | BODY MASS INDEX: 23.62 KG/M2 | DIASTOLIC BLOOD PRESSURE: 65 MMHG | WEIGHT: 178.2 LBS | SYSTOLIC BLOOD PRESSURE: 102 MMHG | HEIGHT: 73 IN | HEART RATE: 67 BPM

## 2025-05-09 DIAGNOSIS — I34.0 SEVERE MITRAL REGURGITATION: Primary | ICD-10-CM

## 2025-05-09 PROBLEM — R57.0 CARDIOGENIC SHOCK (HCC): Status: RESOLVED | Noted: 2022-10-07 | Resolved: 2025-05-09

## 2025-05-09 PROCEDURE — 99214 OFFICE O/P EST MOD 30 MIN: CPT | Performed by: THORACIC SURGERY (CARDIOTHORACIC VASCULAR SURGERY)

## 2025-05-09 NOTE — PROGRESS NOTES
Consultation - Cardiothoracic Surgery   Roney Alvarado 65 y.o. male MRN: [id number]    Physician Requesting Consult: Dr. Ying    Reason for Consult / Principal Problem: Mitral regurgitation    History of Present Illness: Roney Alvarado is a 65 y.o. year old male known to our a service having undergone emergent Mitral Valve Repair & ASD Repair on 10/7/22 for acute wide open mitral regurgitation with cardiogenic shock. His procedure was a complex mitral valve repair with complex P2 resection, chordal transfer, rotational plasty, and 34 mm Ortiz Physio II ring annuloplasty. He did well and his post-op recovery uneventful. Approximately 2 weeks after discharge he returned to the ED in [place], successfully cardioverted and started on Eliquis. He has not had any recurrent Afib or A[place].  Additional PMHx is notable for HLD and KELLY.  Patient has maintained routine follow-up with Cardiology. At his appt in October a loud murmur was detected. He recently under went TTE and then 3D FLIP that confirms sever MR. He is referred back for surgical evaluation.    Patient is accompanied by his wife today. He endorses the history as stated above. He states he feels well. He is retired. He exercise a few days per week at the gym using the elliptical, treadmill and weight machines. He denies any change in his activity tolerance. He denies chest pain, BURROWS, lightheadedness, palpitations, edema or fatigue.   Patient is a non-smoker, does not drink alcohol and no h/o drug use.  He obtains routine dental care, last visit was in April.     Past Medical History:  Past Medical History:   Diagnosis Date    ASD (atrial septal defect)     s/p repair    Hyperlipidemia     Severe mitral regurgitation     s/p repair         Past Surgical History:   Past Surgical History:   Procedure Laterality Date    ATRIAL SEPTECTOMY  10/07/2022    Procedure: REPAIR ATRIAL SEPTAL DEFECT (ASD);  Surgeon: RAMIRO Browne MD;  Location: [facility];   Service: Cardiac Surgery    CARDIAC CATHETERIZATION N/A 10/07/2022    Procedure: CARDIAC CATHETERIZATION;  Surgeon: Cindy Brown DO;  Location: [facility];  Service: Cardiology    CARDIAC CATHETERIZATION N/A 10/07/2022    Procedure: Cardiac Coronary Angiogram;  Surgeon: Cindy Brown DO;  Location: [facility];  Service: Cardiology    CARDIAC CATHETERIZATION N/A 10/07/2022    Procedure: Cardiac iabp;  Surgeon: Cindy Brown DO;  Location: [facility];  Service: Cardiology    COLONOSCOPY N/A 2023    INGUINAL HERNIA REPAIR Left     unsure mesh placement    GA REPLACEMENT MITRAL VALVE W/CARDIOPULMONARY BYP N/A 10/07/2022    Procedure: MITRAL VALVE REPAIR WITH 34MM PHYSIO II ANNULOPLASTY RING;  Surgeon: RAMIRO Browne MD;  Location: [facility];  Service: Cardiac Surgery         Family History:  History reviewed. No pertinent family history.      Social History:    Social History     Substance and Sexual Activity   Alcohol Use Yes    Alcohol/week: 3.0 standard drinks of alcohol    Types: 3 Cans of beer per week    Comment: 3-4 x / week     Social History     Substance and Sexual Activity   Drug Use Not Currently     Social History     Tobacco Use   Smoking Status Never   Smokeless Tobacco Never       Home Medications:   Prior to Admission medications    Medication Sig Start Date End Date Taking? Authorizing Provider   amoxicillin (AMOXIL) 500 mg capsule Take 4 capsules (2,000 mg total) by mouth as needed (1 hour prior to dental procedures) 2/14/25 2/14/26 Yes Loki Ying MD   Ascorbic Acid (vitamin C) 1000 MG tablet every 24 hours   Yes Historical Provider, MD   aspirin 81 mg chewable tablet Chew 81 mg daily   Yes Historical Provider, MD   Lysine 1000 MG TABS    Yes Historical Provider, MD   metoprolol succinate (TOPROL-XL) 25 mg 24 hr tablet TAKE 1/2 TABLET (12.5mg total) BY MOUTH EVERY DAY  Patient taking differently: Take 12.5 mg by mouth daily Takes half tablet a day  "4/8/25  Yes Loki Ying MD   Multiple Vitamin (MULTI-VITAMIN DAILY PO)    Yes Historical Provider, MD   Krill Oil 350 MG CAPS as directed Orally    Historical Provider, MD       Allergies:  No Known Allergies    Review of Systems:  Review of Systems - History obtained from chart review and the patient  General ROS: negative  Psychological ROS: negative  Ophthalmic ROS: negative  ENT ROS: negative  Allergy and Immunology ROS: negative  Hematological and Lymphatic ROS: negative  Endocrine ROS: negative  Breast ROS: negative  Respiratory ROS: no cough, shortness of breath, or wheezing  Cardiovascular ROS: no chest pain or dyspnea on exertion  Gastrointestinal ROS: no abdominal pain, change in bowel habits, or black or bloody stools  Genito-Urinary ROS: no dysuria, trouble voiding, or hematuria  Musculoskeletal ROS: negative  Neurological ROS: no TIA or stroke symptoms  Dermatological ROS: negative    Vital Signs:     Vitals:    05/09/25 0757 05/09/25 0802   BP: 104/68 102/65   BP Location: Left arm Right arm   Patient Position: Sitting Sitting   Cuff Size: Standard Standard   Pulse: 67    Temp: (!) 96.8 °F (36 °C)    TempSrc: Tympanic    SpO2: 100%    Weight: 80.8 kg (178 lb 3.2 oz)    Height: 6' 1\" (1.854 m)        Physical Exam:    General: Alert, oriented, well developed.  HEENT/NECK:  PERRLA.  No jugular venous distention.    Cardiac:Regular rate and rhythm, loud holosystolic murmur, loudest at apex.  Carotid arteries: 2+ pulses, no bruits  Pulmonary:  Breath sounds clear bilaterally.  Abdomen:  Non-tender, Non-distended.  Positive bowel sounds.   Upper extremities: 2+ radial pulses; brisk capillary refill; right hand dominance  Lower extremities: Extremities warm/dry. PT/DP pulses 2+ bilaterally.  No edema B/L  Neuro: Alert and oriented X 3.  Sensation is grossly intact.  No focal deficits.  Musculoskeletal: MAEE, stable gait  Skin: Warm/Dry, without rashes or lesions.    Lab Results:     Results from last 7 " days   Lab Units 05/02/25  1433   WBC Thousand/uL 7.44   HEMOGLOBIN g/dL 14.3   HEMATOCRIT % 43.4   PLATELETS Thousands/uL 199     Creatinine 1.03  GFR 80    Lab Results   Component Value Date    HGBA1C 5.3 04/10/2025     Blood Culture No Growth After 5 Days.             Specimen Collected: 05/02/25  2:33 PM Last Resulted: 05/07/25  8:01 PM           Imaging Studies:     3D FLIP: 5/2/25      Left Ventricle: Left ventricular cavity size is normal. Wall thickness is normal. The left ventricular ejection fraction is 55%. Systolic function is normal. Wall motion is normal.    Left Atrium: The atrium is mildly dilated.    Right Atrium: The atrium is mildly dilated.    Atrial Septum: There is a patent foramen ovale confirmed at rest with predominant left to right shunting using color Doppler.    Left Atrial Appendage: There is normal function. There is no thrombus.    Mitral Valve: The valve has been repaired with an annular ring. A small, mobile echodensity is attached to the posterior leaflet. There is moderate to severe regurgitation with an eccentrically directed jet.        Echocardiogram: 4/28/25      Left Ventricle: Left ventricular cavity size is normal. Wall thickness is mildly increased. The left ventricular ejection fraction is 55%. Systolic function is normal. Wall motion is normal. Diastolic function is normal.    Right Ventricle: Right ventricular cavity size is mildly dilated.    Left Atrium: The atrium is mildly dilated.    Right Atrium: The atrium is mildly dilated.    Aortic Valve: There is mild regurgitation.    Mitral Valve: The valve has been repaired with an annular ring. There is severe regurgitation with an anteriorly directed jet.    Tricuspid Valve: There is mild regurgitation.    Pulmonic Valve: There is mild regurgitation.         Results Review Statement: I personally reviewed the following image studies in PACS and associated radiology reports: 3D FLIP & TTE. My interpretation of the radiology  images/reports is: MR.    Assessment:  Patient Active Problem List    Diagnosis Date Noted    Secundum atrial septal defect 12/05/2023    Mixed sleep apnea     Obstructive sleep apnea (adult) (pediatric)     Atrial flutter with rapid ventricular response ([facility]) 10/26/2022    Chronic heart failure with preserved ejection fraction (HFpEF) ([facility]) 10/26/2022    S/P MVR (mitral valve repair) 10/26/2022    HLD (hyperlipidemia) 10/26/2022    Acute blood loss as cause of postoperative anemia 10/11/2022    High anion gap metabolic acidosis 10/07/2022    Elevated troponin 10/07/2022    Elevated d-dimer 10/07/2022    CON (acute kidney injury) ([facility]) 10/07/2022    Severe mitral regurgitation 10/07/2022    Acute mitral valve rupture ([facility]) 10/07/2022    Transaminitis 10/07/2022    History of repair of congenital atrial septal defect (ASD) 10/07/2022     Cardiology notes reviewed    Impression/Plan:    Severe Mitral Regurgitation s/p Complex Mitral Repair Oct 2022    Patient is asymptomatic , VSS.  Recommend close observation and surveillance for onset of symptoms. Echos per cardiology  We will plan to see patient in 1 year for follow-up or sooner if becomes symptomatic.  May continue to exercise.    Roney Alvarado was comfortable with our recommendations, and his questions were answered to his satisfaction.  Thank you for allowing us to participate in the care of this patient.     The patient recently had a screening colonoscopy in 2023.  Therefore GI referral is not indicated at this time.     SIGNATURE: KYLAH Barr  DATE: May 9, 2025  TIME: 9:06 AM

## (undated) DEVICE — OASIS DRAIN, SINGLE, INLINE & ATS COMPATIBLE: Brand: OASIS

## (undated) DEVICE — SUT PROLENE 3-0 SH 36 IN 8522H

## (undated) DEVICE — CATH DIAG 6FR IMPULSE 100CM FR4

## (undated) DEVICE — GAUZE SPONGES,16 PLY: Brand: CURITY

## (undated) DEVICE — INTENDED FOR TISSUE SEPARATION, AND OTHER PROCEDURES THAT REQUIRE A SHARP SURGICAL BLADE TO PUNCTURE OR CUT.: Brand: BARD-PARKER ® CARBON RIB-BACK BLADES

## (undated) DEVICE — ELECTRODE BLADE E-Z CLEAN 4IN -0014A

## (undated) DEVICE — 32 FR RIGHT ANGLE – SOFT PVC CATHETER: Brand: PVC THORACIC CATHETERS

## (undated) DEVICE — STERNAL WIRE

## (undated) DEVICE — SUT MONOCRYL PLUS 3-0 PS-2 27 IN MCP427H

## (undated) DEVICE — SUT MONOCRYL 3-0 PS-2 18 IN Y497G

## (undated) DEVICE — SUT SILK 2 60 IN SA8H

## (undated) DEVICE — BONE WAX WHITE: Brand: BONE WAX WHITE

## (undated) DEVICE — EVERGRIP INSERT SET 86MM: Brand: FOGARTY EVERGRIP

## (undated) DEVICE — PINNACLE INTRODUCER SHEATH: Brand: PINNACLE

## (undated) DEVICE — PLUMEPEN PRO 10FT

## (undated) DEVICE — ANTIBACTERIAL UNDYED BRAIDED (POLYGLACTIN 910), SYNTHETIC ABSORBABLE SUTURE: Brand: COATED VICRYL

## (undated) DEVICE — SUCTION CATH 18 FR

## (undated) DEVICE — RECIP.STERNUM SAW BLADE 34/7.5/0.7MM: Brand: AESCULAP

## (undated) DEVICE — DGW .035 FC J3MM 150CM TEF: Brand: EMERALD

## (undated) DEVICE — 2000CC GUARDIAN II: Brand: GUARDIAN

## (undated) DEVICE — LIGHT HANDLE COVER SLEEVE DISP BLUE STELLAR

## (undated) DEVICE — GLOVE INDICATOR PI UNDERGLOVE SZ 8 BLUE

## (undated) DEVICE — MICROPUNCTURE INTRODUCER SET SILHOUETTE TRANSITIONLESS PUSH-PLUS DESIGN WITH NITINOL WIRE GUIDE: Brand: MICROPUNCTURE

## (undated) DEVICE — SUT PROLENE 5-0 C-1/C-1 36 IN 8321H

## (undated) DEVICE — SUT SILK 2-0 SH CR/8 18 IN C012D

## (undated) DEVICE — BLANKET HYPOTHERMIA ADULT GAYMAR

## (undated) DEVICE — CATH STRAIGHT RED RUBBER 20 FR

## (undated) DEVICE — 40601 PROLONGED POSITIONING SYSTEM: Brand: 40601 PROLONGED POSITIONING SYSTEM

## (undated) DEVICE — DRESSING ALLEVYN LIFE HEEL 25 X 25.2CM

## (undated) DEVICE — 3000CC GUARDIAN II: Brand: GUARDIAN

## (undated) DEVICE — PACK VALVE PBDS

## (undated) DEVICE — SUT SILK 0 CT-1 30 IN 424H

## (undated) DEVICE — 32 FR STRAIGHT – SOFT PVC CATHETER: Brand: PVC THORACIC CATHETERS

## (undated) DEVICE — SUT PDS PLUS 1 CTB 36 IN PDPB359T

## (undated) DEVICE — PVC URETHRAL CATHETER: Brand: DOVER

## (undated) DEVICE — TRAY FOLEY 16FR SURESTEP TEMP SENS URIMETER STAT LOK

## (undated) DEVICE — GLOVE SRG BIOGEL ECLIPSE 8

## (undated) DEVICE — THERMOFLECT BLANKET, L, 25EA                               TS THERMOFLECT BLANKET, 48" X 84", SILVER, 5/BG, 5 BG/CS NW: Brand: THERMOFLECT

## (undated) DEVICE — BALLOON IABP 8FR 50ML

## (undated) DEVICE — SUT ETHIBOND 2-0 SH/SH 36 IN X523H

## (undated) DEVICE — SILVER-COATED ANTIBACTERIAL BARRIER DRESSING: Brand: ACTICOAT SURGIC 10X25CM 5PK US

## (undated) DEVICE — SUT VICRYL PLUS 1 CTB-1 36 IN VCPB947H

## (undated) DEVICE — SUT ETHIBOND 2-0 SH-1/SH-1 30 IN X763H

## (undated) DEVICE — SUT PROLENE 4-0 BB 36 IN 8581H

## (undated) DEVICE — CATHETER PLUG WITH CAP: Brand: DOVER

## (undated) DEVICE — PLEDGET CARDIO PTFE 9.5 X 4.8 SOFT LF (6EA/PK)

## (undated) DEVICE — CATH DIAG 6FR IMPULSE 100CM FL4

## (undated) DEVICE — IMMOBILIZER KNEE UNIVERSAL 22 IN

## (undated) DEVICE — FILTER SMOKE EVAC VIROSAFE